# Patient Record
Sex: FEMALE | Employment: OTHER | URBAN - METROPOLITAN AREA
[De-identification: names, ages, dates, MRNs, and addresses within clinical notes are randomized per-mention and may not be internally consistent; named-entity substitution may affect disease eponyms.]

---

## 2020-01-01 ENCOUNTER — APPOINTMENT (OUTPATIENT)
Dept: CT IMAGING | Age: 75
DRG: 208 | End: 2020-01-01
Attending: INTERNAL MEDICINE
Payer: SELF-PAY

## 2020-01-01 ENCOUNTER — APPOINTMENT (OUTPATIENT)
Dept: GENERAL RADIOLOGY | Age: 75
DRG: 208 | End: 2020-01-01
Attending: INTERNAL MEDICINE
Payer: SELF-PAY

## 2020-01-01 ENCOUNTER — APPOINTMENT (OUTPATIENT)
Dept: GENERAL RADIOLOGY | Age: 75
DRG: 208 | End: 2020-01-01
Attending: ANESTHESIOLOGY
Payer: SELF-PAY

## 2020-01-01 ENCOUNTER — APPOINTMENT (OUTPATIENT)
Dept: NON INVASIVE DIAGNOSTICS | Age: 75
DRG: 208 | End: 2020-01-01
Attending: INTERNAL MEDICINE
Payer: SELF-PAY

## 2020-01-01 ENCOUNTER — APPOINTMENT (OUTPATIENT)
Dept: ULTRASOUND IMAGING | Age: 75
DRG: 208 | End: 2020-01-01
Attending: INTERNAL MEDICINE
Payer: SELF-PAY

## 2020-01-01 ENCOUNTER — ANESTHESIA EVENT (OUTPATIENT)
Dept: MEDSURG UNIT | Age: 75
DRG: 208 | End: 2020-01-01
Payer: SELF-PAY

## 2020-01-01 ENCOUNTER — APPOINTMENT (OUTPATIENT)
Dept: GENERAL RADIOLOGY | Age: 75
DRG: 208 | End: 2020-01-01
Attending: NURSE PRACTITIONER
Payer: SELF-PAY

## 2020-01-01 ENCOUNTER — APPOINTMENT (OUTPATIENT)
Dept: GENERAL RADIOLOGY | Age: 75
DRG: 208 | End: 2020-01-01
Attending: EMERGENCY MEDICINE
Payer: SELF-PAY

## 2020-01-01 ENCOUNTER — ANESTHESIA (OUTPATIENT)
Dept: MEDSURG UNIT | Age: 75
DRG: 208 | End: 2020-01-01
Payer: SELF-PAY

## 2020-01-01 ENCOUNTER — APPOINTMENT (OUTPATIENT)
Dept: VASCULAR SURGERY | Age: 75
DRG: 208 | End: 2020-01-01
Attending: INTERNAL MEDICINE
Payer: SELF-PAY

## 2020-01-01 ENCOUNTER — HOSPITAL ENCOUNTER (INPATIENT)
Age: 75
LOS: 19 days | DRG: 208 | End: 2020-06-24
Attending: EMERGENCY MEDICINE | Admitting: INTERNAL MEDICINE
Payer: SELF-PAY

## 2020-01-01 VITALS
HEIGHT: 59 IN | BODY MASS INDEX: 34.67 KG/M2 | SYSTOLIC BLOOD PRESSURE: 30 MMHG | TEMPERATURE: 99.1 F | OXYGEN SATURATION: 89 % | HEART RATE: 109 BPM | WEIGHT: 171.96 LBS | RESPIRATION RATE: 35 BRPM | DIASTOLIC BLOOD PRESSURE: 11 MMHG

## 2020-01-01 DIAGNOSIS — N17.9 ACUTE KIDNEY INJURY (HCC): Primary | ICD-10-CM

## 2020-01-01 DIAGNOSIS — U07.1 COVID-19: ICD-10-CM

## 2020-01-01 DIAGNOSIS — Z51.5 END OF LIFE CARE: ICD-10-CM

## 2020-01-01 DIAGNOSIS — R06.02 SOB (SHORTNESS OF BREATH): ICD-10-CM

## 2020-01-01 DIAGNOSIS — U07.1 ACUTE RESPIRATORY FAILURE DUE TO COVID-19 (HCC): ICD-10-CM

## 2020-01-01 DIAGNOSIS — J96.00 ACUTE RESPIRATORY FAILURE DUE TO COVID-19 (HCC): ICD-10-CM

## 2020-01-01 DIAGNOSIS — R53.83 FATIGUE, UNSPECIFIED TYPE: ICD-10-CM

## 2020-01-01 DIAGNOSIS — N17.9 AKI (ACUTE KIDNEY INJURY) (HCC): ICD-10-CM

## 2020-01-01 DIAGNOSIS — R77.8 ELEVATED TROPONIN: ICD-10-CM

## 2020-01-01 LAB
ABO + RH BLD: NORMAL
ADMINISTERED INITIALS, ADMINIT: NORMAL
ALBUMIN SERPL-MCNC: 1.4 G/DL (ref 3.5–5)
ALBUMIN SERPL-MCNC: 1.5 G/DL (ref 3.5–5)
ALBUMIN SERPL-MCNC: 2.1 G/DL (ref 3.5–5)
ALBUMIN SERPL-MCNC: 2.2 G/DL (ref 3.5–5)
ALBUMIN SERPL-MCNC: 2.3 G/DL (ref 3.5–5)
ALBUMIN SERPL-MCNC: 2.3 G/DL (ref 3.5–5)
ALBUMIN SERPL-MCNC: 2.4 G/DL (ref 3.5–5)
ALBUMIN SERPL-MCNC: 2.5 G/DL (ref 3.5–5)
ALBUMIN SERPL-MCNC: 2.6 G/DL (ref 3.5–5)
ALBUMIN SERPL-MCNC: 2.8 G/DL (ref 3.5–5)
ALBUMIN SERPL-MCNC: 3.6 G/DL (ref 3.5–5)
ALBUMIN/GLOB SERPL: 0.6 {RATIO} (ref 1.1–2.2)
ALBUMIN/GLOB SERPL: 0.7 {RATIO} (ref 1.1–2.2)
ALBUMIN/GLOB SERPL: 0.7 {RATIO} (ref 1.1–2.2)
ALBUMIN/GLOB SERPL: 0.9 {RATIO} (ref 1.1–2.2)
ALBUMIN/GLOB SERPL: 0.9 {RATIO} (ref 1.1–2.2)
ALBUMIN/GLOB SERPL: 1.1 {RATIO} (ref 1.1–2.2)
ALP SERPL-CCNC: 113 U/L (ref 45–117)
ALP SERPL-CCNC: 137 U/L (ref 45–117)
ALP SERPL-CCNC: 163 U/L (ref 45–117)
ALP SERPL-CCNC: 176 U/L (ref 45–117)
ALP SERPL-CCNC: 193 U/L (ref 45–117)
ALP SERPL-CCNC: 51 U/L (ref 45–117)
ALP SERPL-CCNC: 52 U/L (ref 45–117)
ALP SERPL-CCNC: 61 U/L (ref 45–117)
ALP SERPL-CCNC: 62 U/L (ref 45–117)
ALP SERPL-CCNC: 72 U/L (ref 45–117)
ALP SERPL-CCNC: 96 U/L (ref 45–117)
ALP SERPL-CCNC: 99 U/L (ref 45–117)
ALT SERPL-CCNC: 24 U/L (ref 12–78)
ALT SERPL-CCNC: 24 U/L (ref 12–78)
ALT SERPL-CCNC: 27 U/L (ref 12–78)
ALT SERPL-CCNC: 27 U/L (ref 12–78)
ALT SERPL-CCNC: 28 U/L (ref 12–78)
ALT SERPL-CCNC: 29 U/L (ref 12–78)
ALT SERPL-CCNC: 54 U/L (ref 12–78)
ALT SERPL-CCNC: 94 U/L (ref 12–78)
ANION GAP SERPL CALC-SCNC: 10 MMOL/L (ref 5–15)
ANION GAP SERPL CALC-SCNC: 11 MMOL/L (ref 5–15)
ANION GAP SERPL CALC-SCNC: 12 MMOL/L (ref 5–15)
ANION GAP SERPL CALC-SCNC: 15 MMOL/L (ref 5–15)
ANION GAP SERPL CALC-SCNC: 18 MMOL/L (ref 5–15)
ANION GAP SERPL CALC-SCNC: 18 MMOL/L (ref 5–15)
ANION GAP SERPL CALC-SCNC: 21 MMOL/L (ref 5–15)
ANION GAP SERPL CALC-SCNC: 23 MMOL/L (ref 5–15)
ANION GAP SERPL CALC-SCNC: 4 MMOL/L (ref 5–15)
ANION GAP SERPL CALC-SCNC: 6 MMOL/L (ref 5–15)
ANION GAP SERPL CALC-SCNC: 7 MMOL/L (ref 5–15)
ANION GAP SERPL CALC-SCNC: 8 MMOL/L (ref 5–15)
ANION GAP SERPL CALC-SCNC: 9 MMOL/L (ref 5–15)
APTT PPP: 103.1 SEC (ref 22.1–32)
APTT PPP: 112.3 SEC (ref 22.1–32)
APTT PPP: 124.3 SEC (ref 22.1–32)
APTT PPP: 127.9 SEC (ref 22.1–32)
APTT PPP: 29.5 SEC (ref 22.1–32)
APTT PPP: 31.4 SEC (ref 22.1–32)
APTT PPP: 32.5 SEC (ref 22.1–32)
APTT PPP: 32.7 SEC (ref 22.1–32)
APTT PPP: 34 SEC (ref 22.1–32)
APTT PPP: 35.4 SEC (ref 22.1–32)
APTT PPP: 39.3 SEC (ref 22.1–32)
APTT PPP: 41.3 SEC (ref 22.1–32)
APTT PPP: 42.8 SEC (ref 22.1–32)
APTT PPP: 45.3 SEC (ref 22.1–32)
APTT PPP: 47.8 SEC (ref 22.1–32)
APTT PPP: 49.8 SEC (ref 22.1–32)
APTT PPP: 49.9 SEC (ref 22.1–32)
APTT PPP: 50.5 SEC (ref 22.1–32)
APTT PPP: 50.7 SEC (ref 22.1–32)
APTT PPP: 51.5 SEC (ref 22.1–32)
APTT PPP: 53.7 SEC (ref 22.1–32)
APTT PPP: 59.4 SEC (ref 22.1–32)
APTT PPP: 62.6 SEC (ref 22.1–32)
APTT PPP: 64.6 SEC (ref 22.1–32)
APTT PPP: 66.7 SEC (ref 22.1–32)
APTT PPP: 77.8 SEC (ref 22.1–32)
APTT PPP: 79.5 SEC (ref 22.1–32)
APTT PPP: 80.9 SEC (ref 22.1–32)
APTT PPP: 83.5 SEC (ref 22.1–32)
APTT PPP: 91.5 SEC (ref 22.1–32)
APTT PPP: 93.4 SEC (ref 22.1–32)
APTT PPP: 95.6 SEC (ref 22.1–32)
APTT PPP: >130 SEC (ref 22.1–32)
ARTERIAL PATENCY WRIST A: ABNORMAL
ARTERIAL PATENCY WRIST A: YES
ARTERIAL PATENCY WRIST A: YES
AST SERPL-CCNC: 107 U/L (ref 15–37)
AST SERPL-CCNC: 256 U/L (ref 15–37)
AST SERPL-CCNC: 36 U/L (ref 15–37)
AST SERPL-CCNC: 36 U/L (ref 15–37)
AST SERPL-CCNC: 37 U/L (ref 15–37)
AST SERPL-CCNC: 50 U/L (ref 15–37)
AST SERPL-CCNC: 58 U/L (ref 15–37)
AST SERPL-CCNC: 58 U/L (ref 15–37)
AST SERPL-CCNC: 62 U/L (ref 15–37)
AST SERPL-CCNC: 64 U/L (ref 15–37)
AST SERPL-CCNC: 66 U/L (ref 15–37)
AST SERPL-CCNC: 68 U/L (ref 15–37)
ATRIAL RATE: 89 BPM
ATRIAL RATE: 92 BPM
ATRIAL RATE: 92 BPM
ATRIAL RATE: 96 BPM
AV VELOCITY RATIO: 0.58
AV VTI RATIO: 0.5
B PERT DNA SPEC QL NAA+PROBE: NOT DETECTED
BACTERIA SPEC CULT: NORMAL
BACTERIA SPEC CULT: NORMAL
BASE DEFICIT BLDA-SCNC: 16.9 MMOL/L
BASE DEFICIT BLDA-SCNC: 25.3 MMOL/L
BASE DEFICIT BLDA-SCNC: 5.2 MMOL/L
BASE DEFICIT BLDA-SCNC: 9.2 MMOL/L
BASOPHILS # BLD: 0 K/UL (ref 0–0.1)
BASOPHILS NFR BLD: 0 % (ref 0–1)
BDY SITE: ABNORMAL
BILIRUB DIRECT SERPL-MCNC: 0.2 MG/DL (ref 0–0.2)
BILIRUB DIRECT SERPL-MCNC: 0.3 MG/DL (ref 0–0.2)
BILIRUB SERPL-MCNC: 0.3 MG/DL (ref 0.2–1)
BILIRUB SERPL-MCNC: 0.4 MG/DL (ref 0.2–1)
BILIRUB SERPL-MCNC: 0.4 MG/DL (ref 0.2–1)
BILIRUB SERPL-MCNC: 0.5 MG/DL (ref 0.2–1)
BILIRUB SERPL-MCNC: 0.5 MG/DL (ref 0.2–1)
BILIRUB SERPL-MCNC: 0.6 MG/DL (ref 0.2–1)
BILIRUB SERPL-MCNC: 0.7 MG/DL (ref 0.2–1)
BILIRUB SERPL-MCNC: 0.7 MG/DL (ref 0.2–1)
BLD PROD TYP BPU: NORMAL
BLOOD GROUP ANTIBODIES SERPL: NORMAL
BNP SERPL-MCNC: 1056 PG/ML
BNP SERPL-MCNC: 1254 PG/ML
BNP SERPL-MCNC: 1267 PG/ML
BNP SERPL-MCNC: 1397 PG/ML
BNP SERPL-MCNC: 1419 PG/ML
BNP SERPL-MCNC: 1610 PG/ML
BNP SERPL-MCNC: 1789 PG/ML
BNP SERPL-MCNC: 2002 PG/ML
BNP SERPL-MCNC: 2212 PG/ML
BNP SERPL-MCNC: 2600 PG/ML
BNP SERPL-MCNC: 2604 PG/ML
BNP SERPL-MCNC: 3330 PG/ML
BNP SERPL-MCNC: 3635 PG/ML
BNP SERPL-MCNC: 445 PG/ML
BNP SERPL-MCNC: 9493 PG/ML
BORDETELLA PARAPERTUSSIS PCR, BORPAR: NOT DETECTED
BPU ID: NORMAL
BUN SERPL-MCNC: 24 MG/DL (ref 6–20)
BUN SERPL-MCNC: 26 MG/DL (ref 6–20)
BUN SERPL-MCNC: 27 MG/DL (ref 6–20)
BUN SERPL-MCNC: 28 MG/DL (ref 6–20)
BUN SERPL-MCNC: 31 MG/DL (ref 6–20)
BUN SERPL-MCNC: 35 MG/DL (ref 6–20)
BUN SERPL-MCNC: 37 MG/DL (ref 6–20)
BUN SERPL-MCNC: 45 MG/DL (ref 6–20)
BUN SERPL-MCNC: 46 MG/DL (ref 6–20)
BUN SERPL-MCNC: 49 MG/DL (ref 6–20)
BUN SERPL-MCNC: 53 MG/DL (ref 6–20)
BUN SERPL-MCNC: 54 MG/DL (ref 6–20)
BUN SERPL-MCNC: 62 MG/DL (ref 6–20)
BUN SERPL-MCNC: 69 MG/DL (ref 6–20)
BUN SERPL-MCNC: 71 MG/DL (ref 6–20)
BUN SERPL-MCNC: 72 MG/DL (ref 6–20)
BUN SERPL-MCNC: 73 MG/DL (ref 6–20)
BUN SERPL-MCNC: 73 MG/DL (ref 6–20)
BUN SERPL-MCNC: 74 MG/DL (ref 6–20)
BUN SERPL-MCNC: 75 MG/DL (ref 6–20)
BUN SERPL-MCNC: 75 MG/DL (ref 6–20)
BUN SERPL-MCNC: 80 MG/DL (ref 6–20)
BUN SERPL-MCNC: 83 MG/DL (ref 6–20)
BUN/CREAT SERPL: 10 (ref 12–20)
BUN/CREAT SERPL: 11 (ref 12–20)
BUN/CREAT SERPL: 12 (ref 12–20)
BUN/CREAT SERPL: 13 (ref 12–20)
BUN/CREAT SERPL: 14 (ref 12–20)
BUN/CREAT SERPL: 14 (ref 12–20)
BUN/CREAT SERPL: 15 (ref 12–20)
BUN/CREAT SERPL: 15 (ref 12–20)
BUN/CREAT SERPL: 16 (ref 12–20)
BUN/CREAT SERPL: 16 (ref 12–20)
BUN/CREAT SERPL: 17 (ref 12–20)
BUN/CREAT SERPL: 18 (ref 12–20)
BUN/CREAT SERPL: 20 (ref 12–20)
C PNEUM DNA SPEC QL NAA+PROBE: NOT DETECTED
CALCIUM SERPL-MCNC: 6 MG/DL (ref 8.5–10.1)
CALCIUM SERPL-MCNC: 6.6 MG/DL (ref 8.5–10.1)
CALCIUM SERPL-MCNC: 6.7 MG/DL (ref 8.5–10.1)
CALCIUM SERPL-MCNC: 6.7 MG/DL (ref 8.5–10.1)
CALCIUM SERPL-MCNC: 6.8 MG/DL (ref 8.5–10.1)
CALCIUM SERPL-MCNC: 7 MG/DL (ref 8.5–10.1)
CALCIUM SERPL-MCNC: 7 MG/DL (ref 8.5–10.1)
CALCIUM SERPL-MCNC: 7.2 MG/DL (ref 8.5–10.1)
CALCIUM SERPL-MCNC: 7.3 MG/DL (ref 8.5–10.1)
CALCIUM SERPL-MCNC: 7.4 MG/DL (ref 8.5–10.1)
CALCIUM SERPL-MCNC: 7.5 MG/DL (ref 8.5–10.1)
CALCIUM SERPL-MCNC: 7.5 MG/DL (ref 8.5–10.1)
CALCIUM SERPL-MCNC: 7.6 MG/DL (ref 8.5–10.1)
CALCIUM SERPL-MCNC: 7.6 MG/DL (ref 8.5–10.1)
CALCIUM SERPL-MCNC: 7.9 MG/DL (ref 8.5–10.1)
CALCIUM SERPL-MCNC: 8 MG/DL (ref 8.5–10.1)
CALCIUM SERPL-MCNC: 8.1 MG/DL (ref 8.5–10.1)
CALCIUM SERPL-MCNC: 8.4 MG/DL (ref 8.5–10.1)
CALCIUM SERPL-MCNC: 8.5 MG/DL (ref 8.5–10.1)
CALCIUM SERPL-MCNC: 8.6 MG/DL (ref 8.5–10.1)
CALCIUM SERPL-MCNC: 8.7 MG/DL (ref 8.5–10.1)
CALCIUM SERPL-MCNC: 9.3 MG/DL (ref 8.5–10.1)
CALCIUM SERPL-MCNC: <5 MG/DL (ref 8.5–10.1)
CALCULATED P AXIS, ECG09: 24 DEGREES
CALCULATED P AXIS, ECG09: 29 DEGREES
CALCULATED P AXIS, ECG09: 29 DEGREES
CALCULATED P AXIS, ECG09: 38 DEGREES
CALCULATED R AXIS, ECG10: -16 DEGREES
CALCULATED R AXIS, ECG10: -16 DEGREES
CALCULATED R AXIS, ECG10: -24 DEGREES
CALCULATED R AXIS, ECG10: -5 DEGREES
CALCULATED T AXIS, ECG11: 43 DEGREES
CALCULATED T AXIS, ECG11: 43 DEGREES
CALCULATED T AXIS, ECG11: 47 DEGREES
CALCULATED T AXIS, ECG11: 48 DEGREES
CHLORIDE SERPL-SCNC: 104 MMOL/L (ref 97–108)
CHLORIDE SERPL-SCNC: 105 MMOL/L (ref 97–108)
CHLORIDE SERPL-SCNC: 105 MMOL/L (ref 97–108)
CHLORIDE SERPL-SCNC: 106 MMOL/L (ref 97–108)
CHLORIDE SERPL-SCNC: 108 MMOL/L (ref 97–108)
CHLORIDE SERPL-SCNC: 108 MMOL/L (ref 97–108)
CHLORIDE SERPL-SCNC: 109 MMOL/L (ref 97–108)
CHLORIDE SERPL-SCNC: 109 MMOL/L (ref 97–108)
CHLORIDE SERPL-SCNC: 110 MMOL/L (ref 97–108)
CHLORIDE SERPL-SCNC: 111 MMOL/L (ref 97–108)
CHLORIDE SERPL-SCNC: 112 MMOL/L (ref 97–108)
CHLORIDE SERPL-SCNC: 112 MMOL/L (ref 97–108)
CHLORIDE SERPL-SCNC: 113 MMOL/L (ref 97–108)
CHLORIDE SERPL-SCNC: 114 MMOL/L (ref 97–108)
CHLORIDE SERPL-SCNC: 115 MMOL/L (ref 97–108)
CHLORIDE SERPL-SCNC: 117 MMOL/L (ref 97–108)
CHLORIDE SERPL-SCNC: 118 MMOL/L (ref 97–108)
CHLORIDE SERPL-SCNC: 119 MMOL/L (ref 97–108)
CHLORIDE SERPL-SCNC: 96 MMOL/L (ref 97–108)
CHLORIDE UR-SCNC: 78 MMOL/L
CK SERPL-CCNC: 111 U/L (ref 26–192)
CO2 SERPL-SCNC: 10 MMOL/L (ref 21–32)
CO2 SERPL-SCNC: 10 MMOL/L (ref 21–32)
CO2 SERPL-SCNC: 12 MMOL/L (ref 21–32)
CO2 SERPL-SCNC: 13 MMOL/L (ref 21–32)
CO2 SERPL-SCNC: 15 MMOL/L (ref 21–32)
CO2 SERPL-SCNC: 17 MMOL/L (ref 21–32)
CO2 SERPL-SCNC: 19 MMOL/L (ref 21–32)
CO2 SERPL-SCNC: 20 MMOL/L (ref 21–32)
CO2 SERPL-SCNC: 21 MMOL/L (ref 21–32)
CO2 SERPL-SCNC: 21 MMOL/L (ref 21–32)
CO2 SERPL-SCNC: 22 MMOL/L (ref 21–32)
CO2 SERPL-SCNC: 22 MMOL/L (ref 21–32)
CO2 SERPL-SCNC: 23 MMOL/L (ref 21–32)
COHGB MFR BLD: 1.2 % (ref 1–2)
COHGB MFR BLD: 1.7 % (ref 1–2)
COHGB MFR BLD: 1.9 % (ref 1–2)
COHGB MFR BLD: 2.1 % (ref 1–2)
COMMENT, HOLDF: NORMAL
COVID-19 RAPID TEST, COVR: DETECTED
CREAT SERPL-MCNC: 2.4 MG/DL (ref 0.55–1.02)
CREAT SERPL-MCNC: 2.44 MG/DL (ref 0.55–1.02)
CREAT SERPL-MCNC: 2.49 MG/DL (ref 0.55–1.02)
CREAT SERPL-MCNC: 2.5 MG/DL (ref 0.55–1.02)
CREAT SERPL-MCNC: 2.79 MG/DL (ref 0.55–1.02)
CREAT SERPL-MCNC: 2.79 MG/DL (ref 0.55–1.02)
CREAT SERPL-MCNC: 3.02 MG/DL (ref 0.55–1.02)
CREAT SERPL-MCNC: 3.07 MG/DL (ref 0.55–1.02)
CREAT SERPL-MCNC: 3.27 MG/DL (ref 0.55–1.02)
CREAT SERPL-MCNC: 3.39 MG/DL (ref 0.55–1.02)
CREAT SERPL-MCNC: 3.58 MG/DL (ref 0.55–1.02)
CREAT SERPL-MCNC: 3.74 MG/DL (ref 0.55–1.02)
CREAT SERPL-MCNC: 4.02 MG/DL (ref 0.55–1.02)
CREAT SERPL-MCNC: 4.14 MG/DL (ref 0.55–1.02)
CREAT SERPL-MCNC: 4.15 MG/DL (ref 0.55–1.02)
CREAT SERPL-MCNC: 4.17 MG/DL (ref 0.55–1.02)
CREAT SERPL-MCNC: 4.18 MG/DL (ref 0.55–1.02)
CREAT SERPL-MCNC: 4.19 MG/DL (ref 0.55–1.02)
CREAT SERPL-MCNC: 4.2 MG/DL (ref 0.55–1.02)
CREAT SERPL-MCNC: 4.27 MG/DL (ref 0.55–1.02)
CREAT SERPL-MCNC: 4.93 MG/DL (ref 0.55–1.02)
CREAT SERPL-MCNC: 5.1 MG/DL (ref 0.55–1.02)
CREAT SERPL-MCNC: 5.39 MG/DL (ref 0.55–1.02)
CREAT UR-MCNC: 59 MG/DL
CREAT UR-MCNC: 62.1 MG/DL
CROSSMATCH RESULT,%XM: NORMAL
CRP SERPL-MCNC: 1.66 MG/DL (ref 0–0.6)
CRP SERPL-MCNC: 10.1 MG/DL (ref 0–0.6)
CRP SERPL-MCNC: 13.1 MG/DL (ref 0–0.6)
CRP SERPL-MCNC: 14.4 MG/DL (ref 0–0.6)
CRP SERPL-MCNC: 14.7 MG/DL (ref 0–0.6)
CRP SERPL-MCNC: 15.5 MG/DL (ref 0–0.6)
CRP SERPL-MCNC: 15.6 MG/DL (ref 0–0.6)
CRP SERPL-MCNC: 16.9 MG/DL (ref 0–0.6)
CRP SERPL-MCNC: 17.3 MG/DL (ref 0–0.6)
CRP SERPL-MCNC: 18.9 MG/DL (ref 0–0.6)
CRP SERPL-MCNC: 19.7 MG/DL (ref 0–0.6)
CRP SERPL-MCNC: 20.2 MG/DL (ref 0–0.6)
CRP SERPL-MCNC: 3.84 MG/DL (ref 0–0.6)
CRP SERPL-MCNC: 4.68 MG/DL (ref 0–0.6)
CRP SERPL-MCNC: 5.8 MG/DL (ref 0–0.6)
CRP SERPL-MCNC: 7.18 MG/DL (ref 0–0.6)
CRP SERPL-MCNC: 9.11 MG/DL (ref 0–0.6)
CRP SERPL-MCNC: 9.89 MG/DL (ref 0–0.6)
D DIMER PPP FEU-MCNC: 0.47 MG/L FEU (ref 0–0.65)
D DIMER PPP FEU-MCNC: 0.52 MG/L FEU (ref 0–0.65)
D DIMER PPP FEU-MCNC: 0.53 MG/L FEU (ref 0–0.65)
D DIMER PPP FEU-MCNC: 0.56 MG/L FEU (ref 0–0.65)
D DIMER PPP FEU-MCNC: 0.79 MG/L FEU (ref 0–0.65)
D DIMER PPP FEU-MCNC: 0.84 MG/L FEU (ref 0–0.65)
D DIMER PPP FEU-MCNC: 0.91 MG/L FEU (ref 0–0.65)
D DIMER PPP FEU-MCNC: 0.96 MG/L FEU (ref 0–0.65)
D DIMER PPP FEU-MCNC: 1.23 MG/L FEU (ref 0–0.65)
D DIMER PPP FEU-MCNC: 1.25 MG/L FEU (ref 0–0.65)
D DIMER PPP FEU-MCNC: 1.41 MG/L FEU (ref 0–0.65)
D DIMER PPP FEU-MCNC: 1.57 MG/L FEU (ref 0–0.65)
D DIMER PPP FEU-MCNC: 1.68 MG/L FEU (ref 0–0.65)
D DIMER PPP FEU-MCNC: 1.72 MG/L FEU (ref 0–0.65)
D DIMER PPP FEU-MCNC: 11.31 MG/L FEU (ref 0–0.65)
D DIMER PPP FEU-MCNC: 2.36 MG/L FEU (ref 0–0.65)
D DIMER PPP FEU-MCNC: 2.97 MG/L FEU (ref 0–0.65)
D DIMER PPP FEU-MCNC: 6.12 MG/L FEU (ref 0–0.65)
D50 ADMINISTERED, D50ADM: 0 ML
D50 ORDER, D50ORD: 0 ML
DIAGNOSIS, 93000: NORMAL
DIFFERENTIAL METHOD BLD: ABNORMAL
DIFFERENTIAL METHOD BLD: NORMAL
ECHO AO ASC DIAM: 3.12 CM
ECHO AO ROOT DIAM: 3.06 CM
ECHO AV AREA PEAK VELOCITY: 1.5 CM2
ECHO AV AREA VTI: 1.4 CM2
ECHO AV AREA/BSA PEAK VELOCITY: 0.8 CM2/M2
ECHO AV AREA/BSA VTI: 0.7 CM2/M2
ECHO AV MEAN GRADIENT: 8 MMHG
ECHO AV MEAN VELOCITY: 1.29 M/S
ECHO AV PEAK GRADIENT: 17.1 MMHG
ECHO AV PEAK VELOCITY: 207.02 CM/S
ECHO AV VTI: 33 CM
ECHO LA AREA 4C: 12.3 CM2
ECHO LA MAJOR AXIS: 3.64 CM
ECHO LA TO AORTIC ROOT RATIO: 1.19
ECHO LA VOL 2C: 18.14 ML (ref 22–52)
ECHO LA VOL 4C: 25.71 ML (ref 22–52)
ECHO LA VOL BP: 21.6 ML (ref 22–52)
ECHO LA VOL/BSA BIPLANE: 12.49 ML/M2 (ref 16–28)
ECHO LA VOLUME INDEX A2C: 10.49 ML/M2 (ref 16–28)
ECHO LA VOLUME INDEX A4C: 14.87 ML/M2 (ref 16–28)
ECHO LV E' LATERAL VELOCITY: 5.45 CENTIMETER/SECOND
ECHO LV E' SEPTAL VELOCITY: 6.02 CENTIMETER/SECOND
ECHO LV EDV TEICHHOLZ: 33.32 ML
ECHO LV ESV TEICHHOLZ: 14.49 ML
ECHO LV INTERNAL DIMENSION DIASTOLIC: 3.8 CM (ref 3.9–5.3)
ECHO LV INTERNAL DIMENSION SYSTOLIC: 2.7 CM
ECHO LV IVSD: 0.85 CM (ref 0.6–0.9)
ECHO LV MASS 2D: 110.6 G (ref 67–162)
ECHO LV MASS INDEX 2D: 64 G/M2 (ref 43–95)
ECHO LV POSTERIOR WALL DIASTOLIC: 0.93 CM (ref 0.6–0.9)
ECHO LVOT DIAM: 1.79 CM
ECHO LVOT PEAK GRADIENT: 5.8 MMHG
ECHO LVOT PEAK VELOCITY: 120.32 CM/S
ECHO LVOT SV: 45.2 ML
ECHO LVOT VTI: 18.04 CM
ECHO MV A VELOCITY: 103.62 CM/S
ECHO MV AREA PHT: 4.7 CM2
ECHO MV E DECELERATION TIME (DT): 161.7 MS
ECHO MV E VELOCITY: 83.86 CM/S
ECHO MV E/A RATIO: 0.81
ECHO MV PRESSURE HALF TIME (PHT): 46.9 MS
ECHO RV INTERNAL DIMENSION: 3.15 CM
ECHO RV TAPSE: 1.78 CM (ref 1.5–2)
EOSINOPHIL # BLD: 0 K/UL (ref 0–0.4)
EOSINOPHIL # BLD: 0.2 K/UL (ref 0–0.4)
EOSINOPHIL NFR BLD: 0 % (ref 0–7)
EOSINOPHIL NFR BLD: 2 % (ref 0–7)
EPAP/CPAP/PEEP, PAPEEP: 8
ERYTHROCYTE [DISTWIDTH] IN BLOOD BY AUTOMATED COUNT: 13.4 % (ref 11.5–14.5)
ERYTHROCYTE [DISTWIDTH] IN BLOOD BY AUTOMATED COUNT: 13.6 % (ref 11.5–14.5)
ERYTHROCYTE [DISTWIDTH] IN BLOOD BY AUTOMATED COUNT: 13.6 % (ref 11.5–14.5)
ERYTHROCYTE [DISTWIDTH] IN BLOOD BY AUTOMATED COUNT: 13.7 % (ref 11.5–14.5)
ERYTHROCYTE [DISTWIDTH] IN BLOOD BY AUTOMATED COUNT: 13.8 % (ref 11.5–14.5)
ERYTHROCYTE [DISTWIDTH] IN BLOOD BY AUTOMATED COUNT: 13.8 % (ref 11.5–14.5)
ERYTHROCYTE [DISTWIDTH] IN BLOOD BY AUTOMATED COUNT: 13.9 % (ref 11.5–14.5)
ERYTHROCYTE [DISTWIDTH] IN BLOOD BY AUTOMATED COUNT: 13.9 % (ref 11.5–14.5)
ERYTHROCYTE [DISTWIDTH] IN BLOOD BY AUTOMATED COUNT: 14 % (ref 11.5–14.5)
ERYTHROCYTE [DISTWIDTH] IN BLOOD BY AUTOMATED COUNT: 14.1 % (ref 11.5–14.5)
ERYTHROCYTE [DISTWIDTH] IN BLOOD BY AUTOMATED COUNT: 14.2 % (ref 11.5–14.5)
ERYTHROCYTE [DISTWIDTH] IN BLOOD BY AUTOMATED COUNT: 14.7 % (ref 11.5–14.5)
ERYTHROCYTE [DISTWIDTH] IN BLOOD BY AUTOMATED COUNT: 15.8 % (ref 11.5–14.5)
ERYTHROCYTE [DISTWIDTH] IN BLOOD BY AUTOMATED COUNT: 15.9 % (ref 11.5–14.5)
ERYTHROCYTE [DISTWIDTH] IN BLOOD BY AUTOMATED COUNT: 15.9 % (ref 11.5–14.5)
ERYTHROCYTE [DISTWIDTH] IN BLOOD BY AUTOMATED COUNT: 16.1 % (ref 11.5–14.5)
EST. AVERAGE GLUCOSE BLD GHB EST-MCNC: 160 MG/DL
EST. AVERAGE GLUCOSE BLD GHB EST-MCNC: 180 MG/DL
FERRITIN SERPL-MCNC: 109 NG/ML (ref 26–388)
FERRITIN SERPL-MCNC: 111 NG/ML (ref 8–252)
FERRITIN SERPL-MCNC: 144 NG/ML (ref 26–388)
FERRITIN SERPL-MCNC: 146 NG/ML (ref 8–252)
FERRITIN SERPL-MCNC: 164 NG/ML (ref 26–388)
FERRITIN SERPL-MCNC: 214 NG/ML (ref 26–388)
FERRITIN SERPL-MCNC: 239 NG/ML (ref 26–388)
FERRITIN SERPL-MCNC: 256 NG/ML (ref 26–388)
FERRITIN SERPL-MCNC: 283 NG/ML (ref 8–252)
FERRITIN SERPL-MCNC: 340 NG/ML (ref 8–252)
FERRITIN SERPL-MCNC: 371 NG/ML (ref 26–388)
FERRITIN SERPL-MCNC: 384 NG/ML (ref 8–252)
FERRITIN SERPL-MCNC: 415 NG/ML (ref 26–388)
FERRITIN SERPL-MCNC: 427 NG/ML (ref 26–388)
FERRITIN SERPL-MCNC: 4373 NG/ML (ref 8–252)
FERRITIN SERPL-MCNC: 443 NG/ML (ref 26–388)
FERRITIN SERPL-MCNC: 5674 NG/ML (ref 8–252)
FERRITIN SERPL-MCNC: ABNORMAL NG/ML (ref 26–388)
FIBRINOGEN PPP-MCNC: 123 MG/DL (ref 200–475)
FIBRINOGEN PPP-MCNC: 141 MG/DL (ref 200–475)
FIBRINOGEN PPP-MCNC: 146 MG/DL (ref 200–475)
FIBRINOGEN PPP-MCNC: 365 MG/DL (ref 200–475)
FIBRINOGEN PPP-MCNC: 408 MG/DL (ref 200–475)
FIBRINOGEN PPP-MCNC: 434 MG/DL (ref 200–475)
FIBRINOGEN PPP-MCNC: 475 MG/DL (ref 200–475)
FIBRINOGEN PPP-MCNC: 498 MG/DL (ref 200–475)
FIBRINOGEN PPP-MCNC: 516 MG/DL (ref 200–475)
FIBRINOGEN PPP-MCNC: 546 MG/DL (ref 200–475)
FIBRINOGEN PPP-MCNC: 567 MG/DL (ref 200–475)
FIBRINOGEN PPP-MCNC: 570 MG/DL (ref 200–475)
FIBRINOGEN PPP-MCNC: 578 MG/DL (ref 200–475)
FIBRINOGEN PPP-MCNC: 607 MG/DL (ref 200–475)
FIBRINOGEN PPP-MCNC: 649 MG/DL (ref 200–475)
FIBRINOGEN PPP-MCNC: 671 MG/DL (ref 200–475)
FIBRINOGEN PPP-MCNC: 671 MG/DL (ref 200–475)
FIBRINOGEN PPP-MCNC: 702 MG/DL (ref 200–475)
FIO2 ON VENT: 100 %
FIO2 ON VENT: 60 %
FIO2 ON VENT: 80 %
FIO2 ON VENT: 80 %
FLUAV H1 2009 PAND RNA SPEC QL NAA+PROBE: NOT DETECTED
FLUAV H1 RNA SPEC QL NAA+PROBE: NOT DETECTED
FLUAV H3 RNA SPEC QL NAA+PROBE: NOT DETECTED
FLUAV SUBTYP SPEC NAA+PROBE: NOT DETECTED
FLUBV RNA SPEC QL NAA+PROBE: NOT DETECTED
GAS FLOW.O2 O2 DELIVERY SYS: 4 L/MIN
GAS FLOW.O2 SETTING OXYMISER: 18 L/MIN
GAS FLOW.O2 SETTING OXYMISER: 25 L/MIN
GLOBULIN SER CALC-MCNC: 2.1 G/DL (ref 2–4)
GLOBULIN SER CALC-MCNC: 3.1 G/DL (ref 2–4)
GLOBULIN SER CALC-MCNC: 3.5 G/DL (ref 2–4)
GLOBULIN SER CALC-MCNC: 3.8 G/DL (ref 2–4)
GLOBULIN SER CALC-MCNC: 3.9 G/DL (ref 2–4)
GLOBULIN SER CALC-MCNC: 3.9 G/DL (ref 2–4)
GLOBULIN SER CALC-MCNC: 4.1 G/DL (ref 2–4)
GLOBULIN SER CALC-MCNC: 4.2 G/DL (ref 2–4)
GLOBULIN SER CALC-MCNC: 4.2 G/DL (ref 2–4)
GLSCOM COMMENTS: NORMAL
GLUCOSE BLD STRIP.AUTO-MCNC: 102 MG/DL (ref 65–100)
GLUCOSE BLD STRIP.AUTO-MCNC: 121 MG/DL (ref 65–100)
GLUCOSE BLD STRIP.AUTO-MCNC: 122 MG/DL (ref 65–100)
GLUCOSE BLD STRIP.AUTO-MCNC: 137 MG/DL (ref 65–100)
GLUCOSE BLD STRIP.AUTO-MCNC: 140 MG/DL (ref 65–100)
GLUCOSE BLD STRIP.AUTO-MCNC: 159 MG/DL (ref 65–100)
GLUCOSE BLD STRIP.AUTO-MCNC: 160 MG/DL (ref 65–100)
GLUCOSE BLD STRIP.AUTO-MCNC: 160 MG/DL (ref 65–100)
GLUCOSE BLD STRIP.AUTO-MCNC: 174 MG/DL (ref 65–100)
GLUCOSE BLD STRIP.AUTO-MCNC: 185 MG/DL (ref 65–100)
GLUCOSE BLD STRIP.AUTO-MCNC: 185 MG/DL (ref 65–100)
GLUCOSE BLD STRIP.AUTO-MCNC: 210 MG/DL (ref 65–100)
GLUCOSE BLD STRIP.AUTO-MCNC: 215 MG/DL (ref 65–100)
GLUCOSE BLD STRIP.AUTO-MCNC: 221 MG/DL (ref 65–100)
GLUCOSE BLD STRIP.AUTO-MCNC: 234 MG/DL (ref 65–100)
GLUCOSE BLD STRIP.AUTO-MCNC: 236 MG/DL (ref 65–100)
GLUCOSE BLD STRIP.AUTO-MCNC: 245 MG/DL (ref 65–100)
GLUCOSE BLD STRIP.AUTO-MCNC: 267 MG/DL (ref 65–100)
GLUCOSE BLD STRIP.AUTO-MCNC: 279 MG/DL (ref 65–100)
GLUCOSE BLD STRIP.AUTO-MCNC: 310 MG/DL (ref 65–100)
GLUCOSE BLD STRIP.AUTO-MCNC: 313 MG/DL (ref 65–100)
GLUCOSE BLD STRIP.AUTO-MCNC: 316 MG/DL (ref 65–100)
GLUCOSE BLD STRIP.AUTO-MCNC: 329 MG/DL (ref 65–100)
GLUCOSE BLD STRIP.AUTO-MCNC: 335 MG/DL (ref 65–100)
GLUCOSE BLD STRIP.AUTO-MCNC: 338 MG/DL (ref 65–100)
GLUCOSE BLD STRIP.AUTO-MCNC: 341 MG/DL (ref 65–100)
GLUCOSE BLD STRIP.AUTO-MCNC: 351 MG/DL (ref 65–100)
GLUCOSE BLD STRIP.AUTO-MCNC: 372 MG/DL (ref 65–100)
GLUCOSE BLD STRIP.AUTO-MCNC: 377 MG/DL (ref 65–100)
GLUCOSE BLD STRIP.AUTO-MCNC: 443 MG/DL (ref 65–100)
GLUCOSE BLD STRIP.AUTO-MCNC: 455 MG/DL (ref 65–100)
GLUCOSE BLD STRIP.AUTO-MCNC: 463 MG/DL (ref 65–100)
GLUCOSE BLD STRIP.AUTO-MCNC: 497 MG/DL (ref 65–100)
GLUCOSE BLD STRIP.AUTO-MCNC: 565 MG/DL (ref 65–100)
GLUCOSE BLD STRIP.AUTO-MCNC: 581 MG/DL (ref 65–100)
GLUCOSE BLD STRIP.AUTO-MCNC: 591 MG/DL (ref 65–100)
GLUCOSE BLD STRIP.AUTO-MCNC: 61 MG/DL (ref 65–100)
GLUCOSE BLD STRIP.AUTO-MCNC: 70 MG/DL (ref 65–100)
GLUCOSE BLD STRIP.AUTO-MCNC: 73 MG/DL (ref 65–100)
GLUCOSE BLD STRIP.AUTO-MCNC: 89 MG/DL (ref 65–100)
GLUCOSE BLD STRIP.AUTO-MCNC: 93 MG/DL (ref 65–100)
GLUCOSE BLD STRIP.AUTO-MCNC: >600 MG/DL (ref 65–100)
GLUCOSE SERPL-MCNC: 101 MG/DL (ref 65–100)
GLUCOSE SERPL-MCNC: 106 MG/DL (ref 65–100)
GLUCOSE SERPL-MCNC: 111 MG/DL (ref 65–100)
GLUCOSE SERPL-MCNC: 117 MG/DL (ref 65–100)
GLUCOSE SERPL-MCNC: 122 MG/DL (ref 65–100)
GLUCOSE SERPL-MCNC: 130 MG/DL (ref 65–100)
GLUCOSE SERPL-MCNC: 134 MG/DL (ref 65–100)
GLUCOSE SERPL-MCNC: 154 MG/DL (ref 65–100)
GLUCOSE SERPL-MCNC: 158 MG/DL (ref 65–100)
GLUCOSE SERPL-MCNC: 211 MG/DL (ref 65–100)
GLUCOSE SERPL-MCNC: 232 MG/DL (ref 65–100)
GLUCOSE SERPL-MCNC: 239 MG/DL (ref 65–100)
GLUCOSE SERPL-MCNC: 273 MG/DL (ref 65–100)
GLUCOSE SERPL-MCNC: 275 MG/DL (ref 65–100)
GLUCOSE SERPL-MCNC: 284 MG/DL (ref 65–100)
GLUCOSE SERPL-MCNC: 323 MG/DL (ref 65–100)
GLUCOSE SERPL-MCNC: 443 MG/DL (ref 65–100)
GLUCOSE SERPL-MCNC: 51 MG/DL (ref 65–100)
GLUCOSE SERPL-MCNC: 531 MG/DL (ref 65–100)
GLUCOSE SERPL-MCNC: 646 MG/DL (ref 65–100)
GLUCOSE SERPL-MCNC: 67 MG/DL (ref 65–100)
GLUCOSE SERPL-MCNC: 89 MG/DL (ref 65–100)
GLUCOSE SERPL-MCNC: 96 MG/DL (ref 65–100)
GLUCOSE, GLC: 102 MG/DL
GLUCOSE, GLC: 121 MG/DL
GLUCOSE, GLC: 122 MG/DL
GLUCOSE, GLC: 174 MG/DL
GLUCOSE, GLC: 185 MG/DL
GLUCOSE, GLC: 234 MG/DL
GLUCOSE, GLC: 236 MG/DL
GLUCOSE, GLC: 310 MG/DL
GLUCOSE, GLC: 313 MG/DL
GLUCOSE, GLC: 377 MG/DL
GLUCOSE, GLC: 443 MG/DL
GLUCOSE, GLC: 463 MG/DL
GLUCOSE, GLC: 497 MG/DL
GLUCOSE, GLC: 581 MG/DL
GLUCOSE, GLC: 600 MG/DL
GLUCOSE, GLC: 89 MG/DL
GLUCOSE, GLC: 93 MG/DL
HADV DNA SPEC QL NAA+PROBE: NOT DETECTED
HAV IGM SER QL: NONREACTIVE
HBA1C MFR BLD: 7.2 % (ref 4–5.6)
HBA1C MFR BLD: 7.9 % (ref 4–5.6)
HBV CORE IGM SER QL: NONREACTIVE
HBV SURFACE AG SER QL: <0.1 INDEX
HBV SURFACE AG SER QL: NEGATIVE
HCO3 BLDA-SCNC: 11 MMOL/L (ref 22–26)
HCO3 BLDA-SCNC: 14 MMOL/L (ref 22–26)
HCO3 BLDA-SCNC: 20 MMOL/L (ref 22–26)
HCO3 BLDA-SCNC: 8 MMOL/L (ref 22–26)
HCOV 229E RNA SPEC QL NAA+PROBE: NOT DETECTED
HCOV HKU1 RNA SPEC QL NAA+PROBE: NOT DETECTED
HCOV NL63 RNA SPEC QL NAA+PROBE: NOT DETECTED
HCOV OC43 RNA SPEC QL NAA+PROBE: NOT DETECTED
HCT VFR BLD AUTO: 12.5 % (ref 35–47)
HCT VFR BLD AUTO: 21.3 % (ref 35–47)
HCT VFR BLD AUTO: 24.8 % (ref 35–47)
HCT VFR BLD AUTO: 25 % (ref 35–47)
HCT VFR BLD AUTO: 25.9 % (ref 35–47)
HCT VFR BLD AUTO: 26.4 % (ref 35–47)
HCT VFR BLD AUTO: 26.6 % (ref 35–47)
HCT VFR BLD AUTO: 27.8 % (ref 35–47)
HCT VFR BLD AUTO: 27.9 % (ref 35–47)
HCT VFR BLD AUTO: 29.2 % (ref 35–47)
HCT VFR BLD AUTO: 32 % (ref 35–47)
HCT VFR BLD AUTO: 32.4 % (ref 35–47)
HCT VFR BLD AUTO: 33.7 % (ref 35–47)
HCT VFR BLD AUTO: 34.4 % (ref 35–47)
HCT VFR BLD AUTO: 36 % (ref 35–47)
HCT VFR BLD AUTO: 38.4 % (ref 35–47)
HCT VFR BLD AUTO: 42.6 % (ref 35–47)
HCV AB SERPL QL IA: NONREACTIVE
HCV COMMENT,HCGAC: NORMAL
HGB BLD OXIMETRY-MCNC: 10.5 G/DL (ref 14–17)
HGB BLD OXIMETRY-MCNC: 10.9 G/DL (ref 14–17)
HGB BLD OXIMETRY-MCNC: 11 G/DL (ref 14–17)
HGB BLD OXIMETRY-MCNC: 11.6 G/DL (ref 14–17)
HGB BLD-MCNC: 10.1 G/DL (ref 11.5–16)
HGB BLD-MCNC: 10.4 G/DL (ref 11.5–16)
HGB BLD-MCNC: 10.5 G/DL (ref 11.5–16)
HGB BLD-MCNC: 10.6 G/DL (ref 11.5–16)
HGB BLD-MCNC: 11 G/DL (ref 11.5–16)
HGB BLD-MCNC: 11.6 G/DL (ref 11.5–16)
HGB BLD-MCNC: 11.7 G/DL (ref 11.5–16)
HGB BLD-MCNC: 11.8 G/DL (ref 11.5–16)
HGB BLD-MCNC: 13.5 G/DL (ref 11.5–16)
HGB BLD-MCNC: 3.6 G/DL (ref 11.5–16)
HGB BLD-MCNC: 6.7 G/DL (ref 11.5–16)
HGB BLD-MCNC: 8 G/DL (ref 11.5–16)
HGB BLD-MCNC: 8.3 G/DL (ref 11.5–16)
HGB BLD-MCNC: 8.7 G/DL (ref 11.5–16)
HGB BLD-MCNC: 8.7 G/DL (ref 11.5–16)
HGB BLD-MCNC: 8.8 G/DL (ref 11.5–16)
HGB BLD-MCNC: 8.9 G/DL (ref 11.5–16)
HGB BLD-MCNC: 9.3 G/DL (ref 11.5–16)
HIGH TARGET, HITG: 300 MG/DL
HIV 1+2 AB+HIV1 P24 AG SERPL QL IA: NONREACTIVE
HIV12 RESULT COMMENT, HHIVC: NORMAL
HMPV RNA SPEC QL NAA+PROBE: NOT DETECTED
HPIV1 RNA SPEC QL NAA+PROBE: NOT DETECTED
HPIV2 RNA SPEC QL NAA+PROBE: NOT DETECTED
HPIV3 RNA SPEC QL NAA+PROBE: NOT DETECTED
HPIV4 RNA SPEC QL NAA+PROBE: NOT DETECTED
IL6 SERPL-MCNC: 113.5 PG/ML (ref 0–15.5)
IMM GRANULOCYTES # BLD AUTO: 0 K/UL
IMM GRANULOCYTES # BLD AUTO: 0 K/UL (ref 0–0.04)
IMM GRANULOCYTES # BLD AUTO: 0.1 K/UL (ref 0–0.04)
IMM GRANULOCYTES # BLD AUTO: 0.2 K/UL (ref 0–0.04)
IMM GRANULOCYTES NFR BLD AUTO: 0 %
IMM GRANULOCYTES NFR BLD AUTO: 0 % (ref 0–0.5)
IMM GRANULOCYTES NFR BLD AUTO: 1 % (ref 0–0.5)
IMM GRANULOCYTES NFR BLD AUTO: 2 % (ref 0–0.5)
INR PPP: 1 (ref 0.9–1.1)
INR PPP: 1.1 (ref 0.9–1.1)
INR PPP: 1.2 (ref 0.9–1.1)
INR PPP: 1.3 (ref 0.9–1.1)
INR PPP: 1.5 (ref 0.9–1.1)
INR PPP: 1.5 (ref 0.9–1.1)
INR PPP: 1.8 (ref 0.9–1.1)
INR PPP: 2.3 (ref 0.9–1.1)
INSULIN ADMINSTERED, INSADM: 0.2 UNITS/HOUR
INSULIN ADMINSTERED, INSADM: 0.7 UNITS/HOUR
INSULIN ADMINSTERED, INSADM: 0.9 UNITS/HOUR
INSULIN ADMINSTERED, INSADM: 10.8 UNITS/HOUR
INSULIN ADMINSTERED, INSADM: 15.6 UNITS/HOUR
INSULIN ADMINSTERED, INSADM: 15.7 UNITS/HOUR
INSULIN ADMINSTERED, INSADM: 15.8 UNITS/HOUR
INSULIN ADMINSTERED, INSADM: 17.5 UNITS/HOUR
INSULIN ADMINSTERED, INSADM: 2.2 UNITS/HOUR
INSULIN ADMINSTERED, INSADM: 2.9 UNITS/HOUR
INSULIN ADMINSTERED, INSADM: 20.2 UNITS/HOUR
INSULIN ADMINSTERED, INSADM: 20.2 UNITS/HOUR
INSULIN ADMINSTERED, INSADM: 22.2 UNITS/HOUR
INSULIN ADMINSTERED, INSADM: 22.5 UNITS/HOUR
INSULIN ADMINSTERED, INSADM: 23 UNITS/HOUR
INSULIN ADMINSTERED, INSADM: 6.6 UNITS/HOUR
INSULIN ADMINSTERED, INSADM: 9 UNITS/HOUR
INSULIN ORDER, INSORD: 0.2 UNITS/HOUR
INSULIN ORDER, INSORD: 0.7 UNITS/HOUR
INSULIN ORDER, INSORD: 0.9 UNITS/HOUR
INSULIN ORDER, INSORD: 10.8 UNITS/HOUR
INSULIN ORDER, INSORD: 15.6 UNITS/HOUR
INSULIN ORDER, INSORD: 15.7 UNITS/HOUR
INSULIN ORDER, INSORD: 15.8 UNITS/HOUR
INSULIN ORDER, INSORD: 17.5 UNITS/HOUR
INSULIN ORDER, INSORD: 2.2 UNITS/HOUR
INSULIN ORDER, INSORD: 2.9 UNITS/HOUR
INSULIN ORDER, INSORD: 20.2 UNITS/HOUR
INSULIN ORDER, INSORD: 20.2 UNITS/HOUR
INSULIN ORDER, INSORD: 22.2 UNITS/HOUR
INSULIN ORDER, INSORD: 22.5 UNITS/HOUR
INSULIN ORDER, INSORD: 23 UNITS/HOUR
INSULIN ORDER, INSORD: 6.6 UNITS/HOUR
INSULIN ORDER, INSORD: 9 UNITS/HOUR
L PNEUMO1 AG UR QL IA: NEGATIVE
LACTATE BLD-SCNC: 1.05 MMOL/L (ref 0.4–2)
LACTATE SERPL-SCNC: 10.8 MMOL/L (ref 0.4–2)
LACTATE SERPL-SCNC: 11 MMOL/L (ref 0.4–2)
LACTATE SERPL-SCNC: 11.7 MMOL/L (ref 0.4–2)
LACTATE SERPL-SCNC: 14.3 MMOL/L (ref 0.4–2)
LACTATE SERPL-SCNC: 14.6 MMOL/L (ref 0.4–2)
LACTATE SERPL-SCNC: 16.9 MMOL/L (ref 0.4–2)
LACTATE SERPL-SCNC: 3.2 MMOL/L (ref 0.4–2)
LACTATE SERPL-SCNC: 3.7 MMOL/L (ref 0.4–2)
LACTATE SERPL-SCNC: 3.9 MMOL/L (ref 0.4–2)
LACTATE SERPL-SCNC: 7.9 MMOL/L (ref 0.4–2)
LACTATE SERPL-SCNC: 8.4 MMOL/L (ref 0.4–2)
LACTATE SERPL-SCNC: 8.4 MMOL/L (ref 0.4–2)
LDH SERPL L TO P-CCNC: 1211 U/L (ref 81–246)
LDH SERPL L TO P-CCNC: 289 U/L (ref 81–246)
LDH SERPL L TO P-CCNC: 308 U/L (ref 81–246)
LDH SERPL L TO P-CCNC: 356 U/L (ref 81–246)
LDH SERPL L TO P-CCNC: 356 U/L (ref 81–246)
LDH SERPL L TO P-CCNC: 357 U/L (ref 81–246)
LDH SERPL L TO P-CCNC: 375 U/L (ref 81–246)
LDH SERPL L TO P-CCNC: 376 U/L (ref 81–246)
LDH SERPL L TO P-CCNC: 383 U/L (ref 81–246)
LDH SERPL L TO P-CCNC: 384 U/L (ref 81–246)
LDH SERPL L TO P-CCNC: 397 U/L (ref 81–246)
LDH SERPL L TO P-CCNC: 436 U/L (ref 81–246)
LDH SERPL L TO P-CCNC: 441 U/L (ref 81–246)
LDH SERPL L TO P-CCNC: 444 U/L (ref 81–246)
LDH SERPL L TO P-CCNC: 446 U/L (ref 81–246)
LDH SERPL L TO P-CCNC: 453 U/L (ref 81–246)
LDH SERPL L TO P-CCNC: 711 U/L (ref 81–246)
LDH SERPL L TO P-CCNC: >4000 U/L (ref 81–246)
LOW TARGET, LOT: 200 MG/DL
LVFS 2D: 29.03 %
LVOT MG: 3.43 MMHG
LVOT MV: 0.82 CM/S
LVSV (TEICH): 18.83 ML
LYMPHOCYTES # BLD: 0.9 K/UL (ref 0.8–3.5)
LYMPHOCYTES # BLD: 1 K/UL (ref 0.8–3.5)
LYMPHOCYTES # BLD: 1.5 K/UL (ref 0.8–3.5)
LYMPHOCYTES # BLD: 1.6 K/UL (ref 0.8–3.5)
LYMPHOCYTES # BLD: 1.9 K/UL (ref 0.8–3.5)
LYMPHOCYTES # BLD: 1.9 K/UL (ref 0.8–3.5)
LYMPHOCYTES # BLD: 2.2 K/UL (ref 0.8–3.5)
LYMPHOCYTES # BLD: 2.3 K/UL (ref 0.8–3.5)
LYMPHOCYTES # BLD: 2.4 K/UL (ref 0.8–3.5)
LYMPHOCYTES # BLD: 2.4 K/UL (ref 0.8–3.5)
LYMPHOCYTES # BLD: 2.9 K/UL (ref 0.8–3.5)
LYMPHOCYTES # BLD: 2.9 K/UL (ref 0.8–3.5)
LYMPHOCYTES # BLD: 3.1 K/UL (ref 0.8–3.5)
LYMPHOCYTES # BLD: 3.4 K/UL (ref 0.8–3.5)
LYMPHOCYTES NFR BLD: 10 % (ref 12–49)
LYMPHOCYTES NFR BLD: 11 % (ref 12–49)
LYMPHOCYTES NFR BLD: 12 % (ref 12–49)
LYMPHOCYTES NFR BLD: 14 % (ref 12–49)
LYMPHOCYTES NFR BLD: 17 % (ref 12–49)
LYMPHOCYTES NFR BLD: 17 % (ref 12–49)
LYMPHOCYTES NFR BLD: 20 % (ref 12–49)
LYMPHOCYTES NFR BLD: 20 % (ref 12–49)
LYMPHOCYTES NFR BLD: 27 % (ref 12–49)
LYMPHOCYTES NFR BLD: 28 % (ref 12–49)
LYMPHOCYTES NFR BLD: 8 % (ref 12–49)
LYMPHOCYTES NFR BLD: 8 % (ref 12–49)
LYMPHOCYTES NFR BLD: 9 % (ref 12–49)
LYMPHOCYTES NFR BLD: 9 % (ref 12–49)
Lab: NORMAL
M PNEUMO DNA SPEC QL NAA+PROBE: NOT DETECTED
M PNEUMO IGM SER IA-ACNC: NONREACTIVE
M TB IFN-G BLD-IMP: ABNORMAL
MAGNESIUM SERPL-MCNC: 1.2 MG/DL (ref 1.6–2.4)
MAGNESIUM SERPL-MCNC: 1.8 MG/DL (ref 1.6–2.4)
MAGNESIUM SERPL-MCNC: 1.8 MG/DL (ref 1.6–2.4)
MAGNESIUM SERPL-MCNC: 1.9 MG/DL (ref 1.6–2.4)
MAGNESIUM SERPL-MCNC: 1.9 MG/DL (ref 1.6–2.4)
MAGNESIUM SERPL-MCNC: 2 MG/DL (ref 1.6–2.4)
MAGNESIUM SERPL-MCNC: 2.1 MG/DL (ref 1.6–2.4)
MAGNESIUM SERPL-MCNC: 2.1 MG/DL (ref 1.6–2.4)
MAGNESIUM SERPL-MCNC: 2.2 MG/DL (ref 1.6–2.4)
MAGNESIUM SERPL-MCNC: 2.2 MG/DL (ref 1.6–2.4)
MAGNESIUM SERPL-MCNC: 2.6 MG/DL (ref 1.6–2.4)
MCH RBC QN AUTO: 29 PG (ref 26–34)
MCH RBC QN AUTO: 29 PG (ref 26–34)
MCH RBC QN AUTO: 29.1 PG (ref 26–34)
MCH RBC QN AUTO: 29.3 PG (ref 26–34)
MCH RBC QN AUTO: 29.5 PG (ref 26–34)
MCH RBC QN AUTO: 29.5 PG (ref 26–34)
MCH RBC QN AUTO: 29.7 PG (ref 26–34)
MCH RBC QN AUTO: 29.8 PG (ref 26–34)
MCH RBC QN AUTO: 29.9 PG (ref 26–34)
MCH RBC QN AUTO: 29.9 PG (ref 26–34)
MCH RBC QN AUTO: 30.1 PG (ref 26–34)
MCH RBC QN AUTO: 30.2 PG (ref 26–34)
MCHC RBC AUTO-ENTMCNC: 28.8 G/DL (ref 30–36.5)
MCHC RBC AUTO-ENTMCNC: 30.6 G/DL (ref 30–36.5)
MCHC RBC AUTO-ENTMCNC: 30.7 G/DL (ref 30–36.5)
MCHC RBC AUTO-ENTMCNC: 31.4 G/DL (ref 30–36.5)
MCHC RBC AUTO-ENTMCNC: 31.5 G/DL (ref 30–36.5)
MCHC RBC AUTO-ENTMCNC: 31.5 G/DL (ref 30–36.5)
MCHC RBC AUTO-ENTMCNC: 31.6 G/DL (ref 30–36.5)
MCHC RBC AUTO-ENTMCNC: 31.7 G/DL (ref 30–36.5)
MCHC RBC AUTO-ENTMCNC: 31.8 G/DL (ref 30–36.5)
MCHC RBC AUTO-ENTMCNC: 32 G/DL (ref 30–36.5)
MCHC RBC AUTO-ENTMCNC: 32.3 G/DL (ref 30–36.5)
MCHC RBC AUTO-ENTMCNC: 32.4 G/DL (ref 30–36.5)
MCHC RBC AUTO-ENTMCNC: 32.7 G/DL (ref 30–36.5)
MCHC RBC AUTO-ENTMCNC: 33.7 G/DL (ref 30–36.5)
MCHC RBC AUTO-ENTMCNC: 34.4 G/DL (ref 30–36.5)
MCHC RBC AUTO-ENTMCNC: 34.8 G/DL (ref 30–36.5)
MCV RBC AUTO: 101.6 FL (ref 80–99)
MCV RBC AUTO: 85.3 FL (ref 80–99)
MCV RBC AUTO: 86.6 FL (ref 80–99)
MCV RBC AUTO: 89.1 FL (ref 80–99)
MCV RBC AUTO: 89.6 FL (ref 80–99)
MCV RBC AUTO: 91.8 FL (ref 80–99)
MCV RBC AUTO: 92 FL (ref 80–99)
MCV RBC AUTO: 92.1 FL (ref 80–99)
MCV RBC AUTO: 92.3 FL (ref 80–99)
MCV RBC AUTO: 92.4 FL (ref 80–99)
MCV RBC AUTO: 92.7 FL (ref 80–99)
MCV RBC AUTO: 93.1 FL (ref 80–99)
MCV RBC AUTO: 93.6 FL (ref 80–99)
MCV RBC AUTO: 93.8 FL (ref 80–99)
MCV RBC AUTO: 97.5 FL (ref 80–99)
MCV RBC AUTO: 97.8 FL (ref 80–99)
METAMYELOCYTES NFR BLD MANUAL: 1 %
METAMYELOCYTES NFR BLD MANUAL: 1 %
METAMYELOCYTES NFR BLD MANUAL: 4 %
METAMYELOCYTES NFR BLD MANUAL: 5 %
METAMYELOCYTES NFR BLD MANUAL: 6 %
METHGB MFR BLD: 0.2 % (ref 0–1.4)
METHGB MFR BLD: 0.2 % (ref 0–1.4)
METHGB MFR BLD: 0.3 % (ref 0–1.4)
METHGB MFR BLD: 0.4 % (ref 0–1.4)
MICROALBUMIN UR-MCNC: 13.5 MG/DL
MICROALBUMIN/CREAT UR-RTO: 217 MG/G (ref 0–30)
MINUTES UNTIL NEXT BG, NBG: 60 MIN
MONOCYTES # BLD: 0.2 K/UL (ref 0–1)
MONOCYTES # BLD: 0.2 K/UL (ref 0–1)
MONOCYTES # BLD: 0.6 K/UL (ref 0–1)
MONOCYTES # BLD: 0.7 K/UL (ref 0–1)
MONOCYTES # BLD: 0.7 K/UL (ref 0–1)
MONOCYTES # BLD: 0.8 K/UL (ref 0–1)
MONOCYTES # BLD: 0.9 K/UL (ref 0–1)
MONOCYTES # BLD: 0.9 K/UL (ref 0–1)
MONOCYTES # BLD: 1 K/UL (ref 0–1)
MONOCYTES # BLD: 1.2 K/UL (ref 0–1)
MONOCYTES # BLD: 1.2 K/UL (ref 0–1)
MONOCYTES # BLD: 1.3 K/UL (ref 0–1)
MONOCYTES # BLD: 1.9 K/UL (ref 0–1)
MONOCYTES # BLD: 3.9 K/UL (ref 0–1)
MONOCYTES NFR BLD: 1 % (ref 5–13)
MONOCYTES NFR BLD: 11 % (ref 5–13)
MONOCYTES NFR BLD: 14 % (ref 5–13)
MONOCYTES NFR BLD: 2 % (ref 5–13)
MONOCYTES NFR BLD: 4 % (ref 5–13)
MONOCYTES NFR BLD: 5 % (ref 5–13)
MONOCYTES NFR BLD: 5 % (ref 5–13)
MONOCYTES NFR BLD: 6 % (ref 5–13)
MONOCYTES NFR BLD: 8 % (ref 5–13)
MONOCYTES NFR BLD: 9 % (ref 5–13)
MULTIPLIER, MUL: 0.01
MULTIPLIER, MUL: 0.02
MULTIPLIER, MUL: 0.02
MULTIPLIER, MUL: 0.03
MULTIPLIER, MUL: 0.03
MULTIPLIER, MUL: 0.04
MULTIPLIER, MUL: 0.04
MULTIPLIER, MUL: 0.05
MULTIPLIER, MUL: 0.05
MULTIPLIER, MUL: 0.06
MULTIPLIER, MUL: 0.06
MULTIPLIER, MUL: 0.07
MULTIPLIER, MUL: 0.07
MULTIPLIER, MUL: 0.08
MULTIPLIER, MUL: 0.09
MV DEC SLOPE: 5.19
MYELOCYTES NFR BLD MANUAL: 1 %
MYELOCYTES NFR BLD MANUAL: 2 %
MYELOCYTES NFR BLD MANUAL: 7 %
NEUTS BAND NFR BLD MANUAL: 14 % (ref 0–6)
NEUTS BAND NFR BLD MANUAL: 6 % (ref 0–6)
NEUTS BAND NFR BLD MANUAL: 6 % (ref 0–6)
NEUTS BAND NFR BLD MANUAL: 8 % (ref 0–6)
NEUTS SEG # BLD: 13.6 K/UL (ref 1.8–8)
NEUTS SEG # BLD: 13.9 K/UL (ref 1.8–8)
NEUTS SEG # BLD: 18.1 K/UL (ref 1.8–8)
NEUTS SEG # BLD: 19 K/UL (ref 1.8–8)
NEUTS SEG # BLD: 26.1 K/UL (ref 1.8–8)
NEUTS SEG # BLD: 30.2 K/UL (ref 1.8–8)
NEUTS SEG # BLD: 6 K/UL (ref 1.8–8)
NEUTS SEG # BLD: 6.1 K/UL (ref 1.8–8)
NEUTS SEG # BLD: 6.5 K/UL (ref 1.8–8)
NEUTS SEG # BLD: 6.9 K/UL (ref 1.8–8)
NEUTS SEG # BLD: 7.2 K/UL (ref 1.8–8)
NEUTS SEG # BLD: 7.4 K/UL (ref 1.8–8)
NEUTS SEG # BLD: 9.2 K/UL (ref 1.8–8)
NEUTS SEG # BLD: 9.9 K/UL (ref 1.8–8)
NEUTS SEG NFR BLD: 63 % (ref 32–75)
NEUTS SEG NFR BLD: 64 % (ref 32–75)
NEUTS SEG NFR BLD: 66 % (ref 32–75)
NEUTS SEG NFR BLD: 67 % (ref 32–75)
NEUTS SEG NFR BLD: 68 % (ref 32–75)
NEUTS SEG NFR BLD: 69 % (ref 32–75)
NEUTS SEG NFR BLD: 74 % (ref 32–75)
NEUTS SEG NFR BLD: 78 % (ref 32–75)
NEUTS SEG NFR BLD: 79 % (ref 32–75)
NEUTS SEG NFR BLD: 84 % (ref 32–75)
NEUTS SEG NFR BLD: 84 % (ref 32–75)
NEUTS SEG NFR BLD: 88 % (ref 32–75)
NRBC # BLD: 0 K/UL (ref 0–0.01)
NRBC # BLD: 0.02 K/UL (ref 0–0.01)
NRBC # BLD: 0.31 K/UL (ref 0–0.01)
NRBC # BLD: 0.48 K/UL (ref 0–0.01)
NRBC # BLD: 2.74 K/UL (ref 0–0.01)
NRBC # BLD: 3.63 K/UL (ref 0–0.01)
NRBC # BLD: 4.82 K/UL (ref 0–0.01)
NRBC # BLD: 6.42 K/UL (ref 0–0.01)
NRBC BLD-RTO: 0 PER 100 WBC
NRBC BLD-RTO: 0.1 PER 100 WBC
NRBC BLD-RTO: 0.1 PER 100 WBC
NRBC BLD-RTO: 0.2 PER 100 WBC
NRBC BLD-RTO: 0.2 PER 100 WBC
NRBC BLD-RTO: 1.5 PER 100 WBC
NRBC BLD-RTO: 12.3 PER 100 WBC
NRBC BLD-RTO: 2 PER 100 WBC
NRBC BLD-RTO: 20 PER 100 WBC
NRBC BLD-RTO: 6.8 PER 100 WBC
NRBC BLD-RTO: 8.4 PER 100 WBC
ORDER INITIALS, ORDINIT: NORMAL
OSMOLALITY UR: NORMAL MOSM/KG H2O
OXYHGB MFR BLD: 70 % (ref 94–97)
OXYHGB MFR BLD: 91 % (ref 94–97)
OXYHGB MFR BLD: 95 % (ref 94–97)
OXYHGB MFR BLD: 97 % (ref 94–97)
P-R INTERVAL, ECG05: 156 MS
P-R INTERVAL, ECG05: 164 MS
P-R INTERVAL, ECG05: 164 MS
P-R INTERVAL, ECG05: 170 MS
PATH REV BLD -IMP: ABNORMAL
PCO2 BLDA: 25 MMHG (ref 35–45)
PCO2 BLDA: 31 MMHG (ref 35–45)
PCO2 BLDA: 37 MMHG (ref 35–45)
PCO2 BLDA: 47 MMHG (ref 35–45)
PCO2 BLDA: 81 MMHG (ref 35–45)
PEEP MAX SETTING VENT: 10 CM[H2O]
PH BLDA: 6.8 [PH] (ref 7.35–7.45)
PH BLDA: 6.87 [PH] (ref 7.35–7.45)
PH BLDA: 7.15 [PH] (ref 7.35–7.45)
PH BLDA: 7.35 [PH] (ref 7.35–7.45)
PH BLDA: 7.36 [PH] (ref 7.35–7.45)
PHOSPHATE SERPL-MCNC: 1.7 MG/DL (ref 2.6–4.7)
PHOSPHATE SERPL-MCNC: 2.1 MG/DL (ref 2.6–4.7)
PHOSPHATE SERPL-MCNC: 2.3 MG/DL (ref 2.6–4.7)
PHOSPHATE SERPL-MCNC: 2.6 MG/DL (ref 2.6–4.7)
PHOSPHATE SERPL-MCNC: 2.7 MG/DL (ref 2.6–4.7)
PHOSPHATE SERPL-MCNC: 2.7 MG/DL (ref 2.6–4.7)
PHOSPHATE SERPL-MCNC: 2.8 MG/DL (ref 2.6–4.7)
PHOSPHATE SERPL-MCNC: 3.6 MG/DL (ref 2.6–4.7)
PHOSPHATE SERPL-MCNC: 3.9 MG/DL (ref 2.6–4.7)
PHOSPHATE SERPL-MCNC: 4.3 MG/DL (ref 2.6–4.7)
PHOSPHATE SERPL-MCNC: 4.3 MG/DL (ref 2.6–4.7)
PHOSPHATE SERPL-MCNC: 4.4 MG/DL (ref 2.6–4.7)
PHOSPHATE SERPL-MCNC: 4.5 MG/DL (ref 2.6–4.7)
PHOSPHATE SERPL-MCNC: 7.1 MG/DL (ref 2.6–4.7)
PHOSPHATE SERPL-MCNC: 8.4 MG/DL (ref 2.6–4.7)
PHOSPHATE SERPL-MCNC: 9.5 MG/DL (ref 2.6–4.7)
PLATELET # BLD AUTO: 116 K/UL (ref 150–400)
PLATELET # BLD AUTO: 163 K/UL (ref 150–400)
PLATELET # BLD AUTO: 164 K/UL (ref 150–400)
PLATELET # BLD AUTO: 175 K/UL (ref 150–400)
PLATELET # BLD AUTO: 187 K/UL (ref 150–400)
PLATELET # BLD AUTO: 279 K/UL (ref 150–400)
PLATELET # BLD AUTO: 292 K/UL (ref 150–400)
PLATELET # BLD AUTO: 309 K/UL (ref 150–400)
PLATELET # BLD AUTO: 320 K/UL (ref 150–400)
PLATELET # BLD AUTO: 326 K/UL (ref 150–400)
PLATELET # BLD AUTO: 334 K/UL (ref 150–400)
PLATELET # BLD AUTO: 368 K/UL (ref 150–400)
PLATELET # BLD AUTO: 43 K/UL (ref 150–400)
PLATELET # BLD AUTO: 44 K/UL (ref 150–400)
PLATELET # BLD AUTO: 46 K/UL (ref 150–400)
PLATELET # BLD AUTO: 58 K/UL (ref 150–400)
PLATELET COMMENTS,PCOM: ABNORMAL
PMV BLD AUTO: 10 FL (ref 8.9–12.9)
PMV BLD AUTO: 10.1 FL (ref 8.9–12.9)
PMV BLD AUTO: 10.1 FL (ref 8.9–12.9)
PMV BLD AUTO: 10.3 FL (ref 8.9–12.9)
PMV BLD AUTO: 10.3 FL (ref 8.9–12.9)
PMV BLD AUTO: 10.5 FL (ref 8.9–12.9)
PMV BLD AUTO: 10.7 FL (ref 8.9–12.9)
PMV BLD AUTO: 11.8 FL (ref 8.9–12.9)
PMV BLD AUTO: 12.8 FL (ref 8.9–12.9)
PMV BLD AUTO: 9.4 FL (ref 8.9–12.9)
PMV BLD AUTO: 9.5 FL (ref 8.9–12.9)
PMV BLD AUTO: 9.7 FL (ref 8.9–12.9)
PMV BLD AUTO: 9.9 FL (ref 8.9–12.9)
PMV BLD AUTO: 9.9 FL (ref 8.9–12.9)
PO2 BLDA: 20 MMHG (ref 80–100)
PO2 BLDA: 29 MMHG (ref 80–100)
PO2 BLDA: 45 MMHG (ref 80–100)
PO2 BLDA: 57 MMHG (ref 80–100)
PO2 BLDA: 62 MMHG (ref 80–100)
POTASSIUM SERPL-SCNC: 3.4 MMOL/L (ref 3.5–5.1)
POTASSIUM SERPL-SCNC: 3.5 MMOL/L (ref 3.5–5.1)
POTASSIUM SERPL-SCNC: 3.8 MMOL/L (ref 3.5–5.1)
POTASSIUM SERPL-SCNC: 3.8 MMOL/L (ref 3.5–5.1)
POTASSIUM SERPL-SCNC: 4 MMOL/L (ref 3.5–5.1)
POTASSIUM SERPL-SCNC: 4 MMOL/L (ref 3.5–5.1)
POTASSIUM SERPL-SCNC: 4.1 MMOL/L (ref 3.5–5.1)
POTASSIUM SERPL-SCNC: 4.3 MMOL/L (ref 3.5–5.1)
POTASSIUM SERPL-SCNC: 4.4 MMOL/L (ref 3.5–5.1)
POTASSIUM SERPL-SCNC: 4.4 MMOL/L (ref 3.5–5.1)
POTASSIUM SERPL-SCNC: 4.6 MMOL/L (ref 3.5–5.1)
POTASSIUM SERPL-SCNC: 4.6 MMOL/L (ref 3.5–5.1)
POTASSIUM SERPL-SCNC: 4.7 MMOL/L (ref 3.5–5.1)
POTASSIUM SERPL-SCNC: 4.9 MMOL/L (ref 3.5–5.1)
POTASSIUM SERPL-SCNC: 5 MMOL/L (ref 3.5–5.1)
POTASSIUM SERPL-SCNC: 5.1 MMOL/L (ref 3.5–5.1)
POTASSIUM SERPL-SCNC: 5.1 MMOL/L (ref 3.5–5.1)
POTASSIUM SERPL-SCNC: 5.2 MMOL/L (ref 3.5–5.1)
POTASSIUM SERPL-SCNC: 6.1 MMOL/L (ref 3.5–5.1)
PROCALCITONIN SERPL-MCNC: 0.12 NG/ML
PROT SERPL-MCNC: 4.4 G/DL (ref 6.4–8.2)
PROT SERPL-MCNC: 5.9 G/DL (ref 6.4–8.2)
PROT SERPL-MCNC: 5.9 G/DL (ref 6.4–8.2)
PROT SERPL-MCNC: 6 G/DL (ref 6.4–8.2)
PROT SERPL-MCNC: 6.3 G/DL (ref 6.4–8.2)
PROT SERPL-MCNC: 6.3 G/DL (ref 6.4–8.2)
PROT SERPL-MCNC: 6.4 G/DL (ref 6.4–8.2)
PROT SERPL-MCNC: 6.6 G/DL (ref 6.4–8.2)
PROT SERPL-MCNC: 6.6 G/DL (ref 6.4–8.2)
PROT SERPL-MCNC: 7.8 G/DL (ref 6.4–8.2)
PROTHROMBIN TIME: 10.7 SEC (ref 9–11.1)
PROTHROMBIN TIME: 10.7 SEC (ref 9–11.1)
PROTHROMBIN TIME: 10.9 SEC (ref 9–11.1)
PROTHROMBIN TIME: 11 SEC (ref 9–11.1)
PROTHROMBIN TIME: 11.4 SEC (ref 9–11.1)
PROTHROMBIN TIME: 11.7 SEC (ref 9–11.1)
PROTHROMBIN TIME: 12.3 SEC (ref 9–11.1)
PROTHROMBIN TIME: 12.4 SEC (ref 9–11.1)
PROTHROMBIN TIME: 12.8 SEC (ref 9–11.1)
PROTHROMBIN TIME: 13.1 SEC (ref 9–11.1)
PROTHROMBIN TIME: 13.2 SEC (ref 9–11.1)
PROTHROMBIN TIME: 13.5 SEC (ref 9–11.1)
PROTHROMBIN TIME: 15.1 SEC (ref 9–11.1)
PROTHROMBIN TIME: 15.4 SEC (ref 9–11.1)
PROTHROMBIN TIME: 18.2 SEC (ref 9–11.1)
PROTHROMBIN TIME: 22.4 SEC (ref 9–11.1)
PROTHROMBIN TIME: 9.9 SEC (ref 9–11.1)
PV END DIASTOLIC VELOCITY: 0.8 MMHG
Q-T INTERVAL, ECG07: 334 MS
Q-T INTERVAL, ECG07: 346 MS
Q-T INTERVAL, ECG07: 350 MS
Q-T INTERVAL, ECG07: 350 MS
QRS DURATION, ECG06: 68 MS
QRS DURATION, ECG06: 70 MS
QRS DURATION, ECG06: 70 MS
QRS DURATION, ECG06: 78 MS
QTC CALCULATION (BEZET), ECG08: 420 MS
QTC CALCULATION (BEZET), ECG08: 421 MS
QTC CALCULATION (BEZET), ECG08: 432 MS
QTC CALCULATION (BEZET), ECG08: 432 MS
QUANTIFERON CRITERIA, QFI1T: ABNORMAL
QUANTIFERON MITOGEN VALUE: >10 IU/ML
QUANTIFERON NIL VALUE: >10 IU/ML
QUANTIFERON TB1 AG: 5.11 IU/ML
QUANTIFERON TB2 AG: 3.64 IU/ML
RBC # BLD AUTO: 1.23 M/UL (ref 3.8–5.2)
RBC # BLD AUTO: 2.65 M/UL (ref 3.8–5.2)
RBC # BLD AUTO: 2.86 M/UL (ref 3.8–5.2)
RBC # BLD AUTO: 2.93 M/UL (ref 3.8–5.2)
RBC # BLD AUTO: 2.97 M/UL (ref 3.8–5.2)
RBC # BLD AUTO: 2.99 M/UL (ref 3.8–5.2)
RBC # BLD AUTO: 3.02 M/UL (ref 3.8–5.2)
RBC # BLD AUTO: 3.02 M/UL (ref 3.8–5.2)
RBC # BLD AUTO: 3.15 M/UL (ref 3.8–5.2)
RBC # BLD AUTO: 3.48 M/UL (ref 3.8–5.2)
RBC # BLD AUTO: 3.53 M/UL (ref 3.8–5.2)
RBC # BLD AUTO: 3.62 M/UL (ref 3.8–5.2)
RBC # BLD AUTO: 3.68 M/UL (ref 3.8–5.2)
RBC # BLD AUTO: 3.86 M/UL (ref 3.8–5.2)
RBC # BLD AUTO: 3.94 M/UL (ref 3.8–5.2)
RBC # BLD AUTO: 4.54 M/UL (ref 3.8–5.2)
RBC MORPH BLD: ABNORMAL
REFERENCE LAB,REFLB: NORMAL
RSV RNA SPEC QL NAA+PROBE: NOT DETECTED
RV+EV RNA SPEC QL NAA+PROBE: NOT DETECTED
SAMPLES BEING HELD,HOLD: NORMAL
SAO2 % BLD: 26 % (ref 92–97)
SAO2 % BLD: 69 % (ref 92–97)
SAO2 % BLD: 72 % (ref 95–99)
SAO2 % BLD: 89 % (ref 92–97)
SAO2 % BLD: 91 % (ref 92–97)
SAO2 % BLD: 93 % (ref 95–99)
SAO2 % BLD: 98 % (ref 95–99)
SAO2 % BLD: 99 % (ref 95–99)
SAO2% DEVICE SAO2% SENSOR NAME: ABNORMAL
SERVICE CMNT-IMP: ABNORMAL
SERVICE CMNT-IMP: NORMAL
SODIUM SERPL-SCNC: 132 MMOL/L (ref 136–145)
SODIUM SERPL-SCNC: 133 MMOL/L (ref 136–145)
SODIUM SERPL-SCNC: 136 MMOL/L (ref 136–145)
SODIUM SERPL-SCNC: 136 MMOL/L (ref 136–145)
SODIUM SERPL-SCNC: 137 MMOL/L (ref 136–145)
SODIUM SERPL-SCNC: 138 MMOL/L (ref 136–145)
SODIUM SERPL-SCNC: 139 MMOL/L (ref 136–145)
SODIUM SERPL-SCNC: 140 MMOL/L (ref 136–145)
SODIUM SERPL-SCNC: 141 MMOL/L (ref 136–145)
SODIUM SERPL-SCNC: 142 MMOL/L (ref 136–145)
SODIUM SERPL-SCNC: 143 MMOL/L (ref 136–145)
SODIUM SERPL-SCNC: 143 MMOL/L (ref 136–145)
SODIUM SERPL-SCNC: 144 MMOL/L (ref 136–145)
SODIUM SERPL-SCNC: 145 MMOL/L (ref 136–145)
SODIUM UR-SCNC: 80 MMOL/L
SOURCE, COVRS: ABNORMAL
SP1: NORMAL
SP2: NORMAL
SP3: NORMAL
SPECIMEN EXP DATE BLD: NORMAL
SPECIMEN SITE: ABNORMAL
SPECIMEN SOURCE, FCOV2M: ABNORMAL
SPECIMEN SOURCE: NORMAL
STATUS OF UNIT,%ST: NORMAL
TEST DESCRIPTION:,ATST: NORMAL
THERAPEUTIC RANGE,PTTT: ABNORMAL SECS (ref 58–77)
THERAPEUTIC RANGE,PTTT: NORMAL SECS (ref 58–77)
THERAPEUTIC RANGE,PTTT: NORMAL SECS (ref 58–77)
TROPONIN I SERPL-MCNC: 0.12 NG/ML
TROPONIN I SERPL-MCNC: 0.14 NG/ML
TROPONIN I SERPL-MCNC: 0.14 NG/ML
TROPONIN I SERPL-MCNC: 0.16 NG/ML
UNIT DIVISION, %UDIV: 0
VANCOMYCIN SERPL-MCNC: 16.3 UG/ML
VANCOMYCIN SERPL-MCNC: 18.6 UG/ML
VANCOMYCIN SERPL-MCNC: 23.2 UG/ML
VENTILATION MODE VENT: ABNORMAL
VENTRICULAR RATE, ECG03: 89 BPM
VENTRICULAR RATE, ECG03: 92 BPM
VENTRICULAR RATE, ECG03: 92 BPM
VENTRICULAR RATE, ECG03: 96 BPM
VT SETTING VENT: 450 ML
WBC # BLD AUTO: 10.5 K/UL (ref 3.6–11)
WBC # BLD AUTO: 11 K/UL (ref 3.6–11)
WBC # BLD AUTO: 11 K/UL (ref 3.6–11)
WBC # BLD AUTO: 11.2 K/UL (ref 3.6–11)
WBC # BLD AUTO: 11.3 K/UL (ref 3.6–11)
WBC # BLD AUTO: 16.1 K/UL (ref 3.6–11)
WBC # BLD AUTO: 17.4 K/UL (ref 3.6–11)
WBC # BLD AUTO: 21.1 K/UL (ref 3.6–11)
WBC # BLD AUTO: 24.1 K/UL (ref 3.6–11)
WBC # BLD AUTO: 32.2 K/UL (ref 3.6–11)
WBC # BLD AUTO: 39.3 K/UL (ref 3.6–11)
WBC # BLD AUTO: 40.5 K/UL (ref 3.6–11)
WBC # BLD AUTO: 43.1 K/UL (ref 3.6–11)
WBC # BLD AUTO: 8.8 K/UL (ref 3.6–11)
WBC # BLD AUTO: 9.5 K/UL (ref 3.6–11)
WBC # BLD AUTO: 9.6 K/UL (ref 3.6–11)
WBC MORPH BLD: ABNORMAL

## 2020-01-01 PROCEDURE — 74011250637 HC RX REV CODE- 250/637: Performed by: INTERNAL MEDICINE

## 2020-01-01 PROCEDURE — 77010033678 HC OXYGEN DAILY

## 2020-01-01 PROCEDURE — 36415 COLL VENOUS BLD VENIPUNCTURE: CPT

## 2020-01-01 PROCEDURE — 83036 HEMOGLOBIN GLYCOSYLATED A1C: CPT

## 2020-01-01 PROCEDURE — 99285 EMERGENCY DEPT VISIT HI MDM: CPT

## 2020-01-01 PROCEDURE — 83880 ASSAY OF NATRIURETIC PEPTIDE: CPT

## 2020-01-01 PROCEDURE — 85384 FIBRINOGEN ACTIVITY: CPT

## 2020-01-01 PROCEDURE — 77030005401 HC CATH RAD ARRO -A

## 2020-01-01 PROCEDURE — 74011000250 HC RX REV CODE- 250: Performed by: INTERNAL MEDICINE

## 2020-01-01 PROCEDURE — 83605 ASSAY OF LACTIC ACID: CPT

## 2020-01-01 PROCEDURE — 80202 ASSAY OF VANCOMYCIN: CPT

## 2020-01-01 PROCEDURE — 85379 FIBRIN DEGRADATION QUANT: CPT

## 2020-01-01 PROCEDURE — 85025 COMPLETE CBC W/AUTO DIFF WBC: CPT

## 2020-01-01 PROCEDURE — 85730 THROMBOPLASTIN TIME PARTIAL: CPT

## 2020-01-01 PROCEDURE — 82728 ASSAY OF FERRITIN: CPT

## 2020-01-01 PROCEDURE — 74011000258 HC RX REV CODE- 258: Performed by: INTERNAL MEDICINE

## 2020-01-01 PROCEDURE — 65660000000 HC RM CCU STEPDOWN

## 2020-01-01 PROCEDURE — 80069 RENAL FUNCTION PANEL: CPT

## 2020-01-01 PROCEDURE — 84100 ASSAY OF PHOSPHORUS: CPT

## 2020-01-01 PROCEDURE — 83520 IMMUNOASSAY QUANT NOS NONAB: CPT

## 2020-01-01 PROCEDURE — 94003 VENT MGMT INPAT SUBQ DAY: CPT

## 2020-01-01 PROCEDURE — 74011636637 HC RX REV CODE- 636/637: Performed by: INTERNAL MEDICINE

## 2020-01-01 PROCEDURE — 84145 PROCALCITONIN (PCT): CPT

## 2020-01-01 PROCEDURE — 87040 BLOOD CULTURE FOR BACTERIA: CPT

## 2020-01-01 PROCEDURE — 5A1945Z RESPIRATORY VENTILATION, 24-96 CONSECUTIVE HOURS: ICD-10-PCS | Performed by: STUDENT IN AN ORGANIZED HEALTH CARE EDUCATION/TRAINING PROGRAM

## 2020-01-01 PROCEDURE — 77030027138 HC INCENT SPIROMETER -A

## 2020-01-01 PROCEDURE — 71045 X-RAY EXAM CHEST 1 VIEW: CPT

## 2020-01-01 PROCEDURE — 83615 LACTATE (LD) (LDH) ENZYME: CPT

## 2020-01-01 PROCEDURE — 74011000250 HC RX REV CODE- 250: Performed by: PHYSICIAN ASSISTANT

## 2020-01-01 PROCEDURE — 74011250636 HC RX REV CODE- 250/636: Performed by: INTERNAL MEDICINE

## 2020-01-01 PROCEDURE — C9113 INJ PANTOPRAZOLE SODIUM, VIA: HCPCS | Performed by: INTERNAL MEDICINE

## 2020-01-01 PROCEDURE — 82962 GLUCOSE BLOOD TEST: CPT

## 2020-01-01 PROCEDURE — 86140 C-REACTIVE PROTEIN: CPT

## 2020-01-01 PROCEDURE — 94002 VENT MGMT INPAT INIT DAY: CPT

## 2020-01-01 PROCEDURE — 94760 N-INVAS EAR/PLS OXIMETRY 1: CPT

## 2020-01-01 PROCEDURE — 74011250636 HC RX REV CODE- 250/636: Performed by: PHYSICIAN ASSISTANT

## 2020-01-01 PROCEDURE — 82803 BLOOD GASES ANY COMBINATION: CPT

## 2020-01-01 PROCEDURE — 70450 CT HEAD/BRAIN W/O DYE: CPT

## 2020-01-01 PROCEDURE — 74011000258 HC RX REV CODE- 258: Performed by: PHYSICIAN ASSISTANT

## 2020-01-01 PROCEDURE — 85018 HEMOGLOBIN: CPT

## 2020-01-01 PROCEDURE — 82436 ASSAY OF URINE CHLORIDE: CPT

## 2020-01-01 PROCEDURE — 83735 ASSAY OF MAGNESIUM: CPT

## 2020-01-01 PROCEDURE — 93005 ELECTROCARDIOGRAM TRACING: CPT

## 2020-01-01 PROCEDURE — 77030012794 HC KT NSL CANN/HGH TRAN -A

## 2020-01-01 PROCEDURE — 85610 PROTHROMBIN TIME: CPT

## 2020-01-01 PROCEDURE — 30233N1 TRANSFUSION OF NONAUTOLOGOUS RED BLOOD CELLS INTO PERIPHERAL VEIN, PERCUTANEOUS APPROACH: ICD-10-PCS | Performed by: INTERNAL MEDICINE

## 2020-01-01 PROCEDURE — 84484 ASSAY OF TROPONIN QUANT: CPT

## 2020-01-01 PROCEDURE — 36600 WITHDRAWAL OF ARTERIAL BLOOD: CPT

## 2020-01-01 PROCEDURE — 87635 SARS-COV-2 COVID-19 AMP PRB: CPT

## 2020-01-01 PROCEDURE — 82570 ASSAY OF URINE CREATININE: CPT

## 2020-01-01 PROCEDURE — 80053 COMPREHEN METABOLIC PANEL: CPT

## 2020-01-01 PROCEDURE — 77030005513 HC CATH URETH FOL11 MDII -B

## 2020-01-01 PROCEDURE — 77030041247 HC PROTECTOR HEEL HEELMEDIX MDII -B

## 2020-01-01 PROCEDURE — 82375 ASSAY CARBOXYHB QUANT: CPT

## 2020-01-01 PROCEDURE — 77030013797 HC KT TRNSDUC PRSSR EDWD -A

## 2020-01-01 PROCEDURE — 31500 INSERT EMERGENCY AIRWAY: CPT

## 2020-01-01 PROCEDURE — 65610000006 HC RM INTENSIVE CARE

## 2020-01-01 PROCEDURE — 73502 X-RAY EXAM HIP UNI 2-3 VIEWS: CPT

## 2020-01-01 PROCEDURE — 85027 COMPLETE CBC AUTOMATED: CPT

## 2020-01-01 PROCEDURE — P9016 RBC LEUKOCYTES REDUCED: HCPCS

## 2020-01-01 PROCEDURE — 0100U RESPIRATORY PANEL,PCR,NASOPHARYNGEAL: CPT

## 2020-01-01 PROCEDURE — 86923 COMPATIBILITY TEST ELECTRIC: CPT

## 2020-01-01 PROCEDURE — 86480 TB TEST CELL IMMUN MEASURE: CPT

## 2020-01-01 PROCEDURE — 05HM33Z INSERTION OF INFUSION DEVICE INTO RIGHT INTERNAL JUGULAR VEIN, PERCUTANEOUS APPROACH: ICD-10-PCS | Performed by: ANESTHESIOLOGY

## 2020-01-01 PROCEDURE — 76770 US EXAM ABDO BACK WALL COMP: CPT

## 2020-01-01 PROCEDURE — 82043 UR ALBUMIN QUANTITATIVE: CPT

## 2020-01-01 PROCEDURE — 87389 HIV-1 AG W/HIV-1&-2 AB AG IA: CPT

## 2020-01-01 PROCEDURE — 74011250637 HC RX REV CODE- 250/637: Performed by: PHYSICIAN ASSISTANT

## 2020-01-01 PROCEDURE — 93970 EXTREMITY STUDY: CPT

## 2020-01-01 PROCEDURE — 84300 ASSAY OF URINE SODIUM: CPT

## 2020-01-01 PROCEDURE — 36430 TRANSFUSION BLD/BLD COMPNT: CPT

## 2020-01-01 PROCEDURE — 93306 TTE W/DOPPLER COMPLETE: CPT

## 2020-01-01 PROCEDURE — 74011250637 HC RX REV CODE- 250/637: Performed by: NURSE PRACTITIONER

## 2020-01-01 PROCEDURE — 77030008771 HC TU NG SALEM SUMP -A

## 2020-01-01 PROCEDURE — 74011250636 HC RX REV CODE- 250/636

## 2020-01-01 PROCEDURE — 86738 MYCOPLASMA ANTIBODY: CPT

## 2020-01-01 PROCEDURE — 0BH17EZ INSERTION OF ENDOTRACHEAL AIRWAY INTO TRACHEA, VIA NATURAL OR ARTIFICIAL OPENING: ICD-10-PCS | Performed by: STUDENT IN AN ORGANIZED HEALTH CARE EDUCATION/TRAINING PROGRAM

## 2020-01-01 PROCEDURE — 86900 BLOOD TYPING SEROLOGIC ABO: CPT

## 2020-01-01 PROCEDURE — 82550 ASSAY OF CK (CPK): CPT

## 2020-01-01 PROCEDURE — 80074 ACUTE HEPATITIS PANEL: CPT

## 2020-01-01 PROCEDURE — 87449 NOS EACH ORGANISM AG IA: CPT

## 2020-01-01 PROCEDURE — P9047 ALBUMIN (HUMAN), 25%, 50ML: HCPCS | Performed by: INTERNAL MEDICINE

## 2020-01-01 PROCEDURE — 84460 ALANINE AMINO (ALT) (SGPT): CPT

## 2020-01-01 PROCEDURE — 74018 RADEX ABDOMEN 1 VIEW: CPT

## 2020-01-01 PROCEDURE — 83935 ASSAY OF URINE OSMOLALITY: CPT

## 2020-01-01 PROCEDURE — 80048 BASIC METABOLIC PNL TOTAL CA: CPT

## 2020-01-01 PROCEDURE — 77030019896 HC KT ARTERIAL LN TELE -B

## 2020-01-01 PROCEDURE — 82248 BILIRUBIN DIRECT: CPT

## 2020-01-01 PROCEDURE — 80076 HEPATIC FUNCTION PANEL: CPT

## 2020-01-01 RX ORDER — INSULIN LISPRO 100 [IU]/ML
INJECTION, SOLUTION INTRAVENOUS; SUBCUTANEOUS
Status: DISCONTINUED | OUTPATIENT
Start: 2020-01-01 | End: 2020-01-01

## 2020-01-01 RX ORDER — GLYCOPYRROLATE 0.2 MG/ML
0.2 INJECTION INTRAMUSCULAR; INTRAVENOUS
Status: DISCONTINUED | OUTPATIENT
Start: 2020-01-01 | End: 2020-01-01 | Stop reason: HOSPADM

## 2020-01-01 RX ORDER — POTASSIUM CHLORIDE 750 MG/1
20 TABLET, FILM COATED, EXTENDED RELEASE ORAL
Status: COMPLETED | OUTPATIENT
Start: 2020-01-01 | End: 2020-01-01

## 2020-01-01 RX ORDER — INSULIN LISPRO 100 [IU]/ML
INJECTION, SOLUTION INTRAVENOUS; SUBCUTANEOUS EVERY 6 HOURS
Status: DISCONTINUED | OUTPATIENT
Start: 2020-01-01 | End: 2020-01-01

## 2020-01-01 RX ORDER — ACETAMINOPHEN 325 MG/1
650 TABLET ORAL
Status: DISCONTINUED | OUTPATIENT
Start: 2020-01-01 | End: 2020-01-01 | Stop reason: HOSPADM

## 2020-01-01 RX ORDER — NALOXONE HYDROCHLORIDE 0.4 MG/ML
0.4 INJECTION, SOLUTION INTRAMUSCULAR; INTRAVENOUS; SUBCUTANEOUS AS NEEDED
Status: DISCONTINUED | OUTPATIENT
Start: 2020-01-01 | End: 2020-01-01

## 2020-01-01 RX ORDER — OXYCODONE AND ACETAMINOPHEN 5; 325 MG/1; MG/1
2 TABLET ORAL
Status: DISCONTINUED | OUTPATIENT
Start: 2020-01-01 | End: 2020-01-01

## 2020-01-01 RX ORDER — POLYETHYLENE GLYCOL 3350 17 G/17G
17 POWDER, FOR SOLUTION ORAL 2 TIMES DAILY
Status: DISCONTINUED | OUTPATIENT
Start: 2020-01-01 | End: 2020-01-01

## 2020-01-01 RX ORDER — SODIUM CHLORIDE 9 MG/ML
250 INJECTION, SOLUTION INTRAVENOUS AS NEEDED
Status: DISCONTINUED | OUTPATIENT
Start: 2020-01-01 | End: 2020-01-01

## 2020-01-01 RX ORDER — INSULIN LISPRO 100 [IU]/ML
22 INJECTION, SOLUTION INTRAVENOUS; SUBCUTANEOUS ONCE
Status: COMPLETED | OUTPATIENT
Start: 2020-01-01 | End: 2020-01-01

## 2020-01-01 RX ORDER — SODIUM CHLORIDE 0.9 % (FLUSH) 0.9 %
5-40 SYRINGE (ML) INJECTION EVERY 8 HOURS
Status: DISCONTINUED | OUTPATIENT
Start: 2020-01-01 | End: 2020-01-01 | Stop reason: HOSPADM

## 2020-01-01 RX ORDER — LISINOPRIL 5 MG/1
5 TABLET ORAL DAILY
COMMUNITY

## 2020-01-01 RX ORDER — ENOXAPARIN SODIUM 100 MG/ML
40 INJECTION SUBCUTANEOUS EVERY 24 HOURS
Status: DISCONTINUED | OUTPATIENT
Start: 2020-01-01 | End: 2020-01-01

## 2020-01-01 RX ORDER — HEPARIN SODIUM 5000 [USP'U]/ML
5000 INJECTION, SOLUTION INTRAVENOUS; SUBCUTANEOUS EVERY 8 HOURS
Status: DISCONTINUED | OUTPATIENT
Start: 2020-01-01 | End: 2020-01-01

## 2020-01-01 RX ORDER — DEXTROSE 50 % IN WATER (D50W) INTRAVENOUS SYRINGE
12.5-25 AS NEEDED
Status: DISCONTINUED | OUTPATIENT
Start: 2020-01-01 | End: 2020-01-01

## 2020-01-01 RX ORDER — MAGNESIUM SULFATE 100 %
4 CRYSTALS MISCELLANEOUS AS NEEDED
Status: DISCONTINUED | OUTPATIENT
Start: 2020-01-01 | End: 2020-01-01

## 2020-01-01 RX ORDER — MIDAZOLAM HYDROCHLORIDE 5 MG/ML
1 INJECTION INTRAMUSCULAR; INTRAVENOUS
Status: DISCONTINUED | OUTPATIENT
Start: 2020-01-01 | End: 2020-01-01 | Stop reason: HOSPADM

## 2020-01-01 RX ORDER — GUAIFENESIN 100 MG/5ML
324 LIQUID (ML) ORAL DAILY
Status: DISCONTINUED | OUTPATIENT
Start: 2020-01-01 | End: 2020-01-01

## 2020-01-01 RX ORDER — NOREPINEPHRINE BITARTRATE/D5W 8 MG/250ML
.5-2 PLASTIC BAG, INJECTION (ML) INTRAVENOUS
Status: DISCONTINUED | OUTPATIENT
Start: 2020-01-01 | End: 2020-01-01

## 2020-01-01 RX ORDER — SODIUM CHLORIDE 9 MG/ML
250 INJECTION, SOLUTION INTRAVENOUS AS NEEDED
Status: DISCONTINUED | OUTPATIENT
Start: 2020-01-01 | End: 2020-01-01 | Stop reason: HOSPADM

## 2020-01-01 RX ORDER — PREDNISONE 5 MG/1
10 TABLET ORAL DAILY
COMMUNITY
Start: 2020-01-01 | End: 2020-07-02

## 2020-01-01 RX ORDER — HYDROMORPHONE HYDROCHLORIDE 1 MG/ML
0.5 INJECTION, SOLUTION INTRAMUSCULAR; INTRAVENOUS; SUBCUTANEOUS ONCE
Status: COMPLETED | OUTPATIENT
Start: 2020-01-01 | End: 2020-01-01

## 2020-01-01 RX ORDER — ZINC SULFATE 50(220)MG
1 CAPSULE ORAL DAILY
Status: DISCONTINUED | OUTPATIENT
Start: 2020-01-01 | End: 2020-01-01

## 2020-01-01 RX ORDER — DEXAMETHASONE 4 MG/1
10 TABLET ORAL DAILY
Status: DISCONTINUED | OUTPATIENT
Start: 2020-01-01 | End: 2020-01-01

## 2020-01-01 RX ORDER — CALCIUM GLUCONATE 20 MG/ML
1 INJECTION, SOLUTION INTRAVENOUS ONCE
Status: DISCONTINUED | OUTPATIENT
Start: 2020-01-01 | End: 2020-01-01

## 2020-01-01 RX ORDER — OXYCODONE AND ACETAMINOPHEN 5; 325 MG/1; MG/1
1 TABLET ORAL
Status: DISCONTINUED | OUTPATIENT
Start: 2020-01-01 | End: 2020-01-01

## 2020-01-01 RX ORDER — PROPOFOL 10 MG/ML
INJECTION, EMULSION INTRAVENOUS
Status: COMPLETED
Start: 2020-01-01 | End: 2020-01-01

## 2020-01-01 RX ORDER — FUROSEMIDE 10 MG/ML
20 INJECTION INTRAMUSCULAR; INTRAVENOUS ONCE
Status: COMPLETED | OUTPATIENT
Start: 2020-01-01 | End: 2020-01-01

## 2020-01-01 RX ORDER — SODIUM BICARBONATE 1 MEQ/ML
100 SYRINGE (ML) INTRAVENOUS ONCE
Status: COMPLETED | OUTPATIENT
Start: 2020-01-01 | End: 2020-01-01

## 2020-01-01 RX ORDER — MORPHINE SULFATE 2 MG/ML
2 INJECTION, SOLUTION INTRAMUSCULAR; INTRAVENOUS
Status: DISCONTINUED | OUTPATIENT
Start: 2020-01-01 | End: 2020-01-01

## 2020-01-01 RX ORDER — SODIUM CHLORIDE 9 MG/ML
125 INJECTION, SOLUTION INTRAVENOUS CONTINUOUS
Status: DISCONTINUED | OUTPATIENT
Start: 2020-01-01 | End: 2020-01-01

## 2020-01-01 RX ORDER — FAMOTIDINE 20 MG/1
20 TABLET, FILM COATED ORAL 2 TIMES DAILY
Status: DISCONTINUED | OUTPATIENT
Start: 2020-01-01 | End: 2020-01-01

## 2020-01-01 RX ORDER — METRONIDAZOLE 500 MG/100ML
500 INJECTION, SOLUTION INTRAVENOUS EVERY 12 HOURS
Status: DISCONTINUED | OUTPATIENT
Start: 2020-01-01 | End: 2020-01-01

## 2020-01-01 RX ORDER — AMOXICILLIN 250 MG
1 CAPSULE ORAL DAILY
Status: DISCONTINUED | OUTPATIENT
Start: 2020-01-01 | End: 2020-01-01

## 2020-01-01 RX ORDER — SODIUM BICARBONATE 650 MG/1
650 TABLET ORAL 2 TIMES DAILY
Status: DISCONTINUED | OUTPATIENT
Start: 2020-01-01 | End: 2020-01-01

## 2020-01-01 RX ORDER — HEPARIN SODIUM 10000 [USP'U]/100ML
4-36 INJECTION, SOLUTION INTRAVENOUS
Status: DISCONTINUED | OUTPATIENT
Start: 2020-01-01 | End: 2020-01-01

## 2020-01-01 RX ORDER — DEXTROSE 50 % IN WATER (D50W) INTRAVENOUS SYRINGE
25-50 AS NEEDED
Status: DISCONTINUED | OUTPATIENT
Start: 2020-01-01 | End: 2020-01-01

## 2020-01-01 RX ORDER — SODIUM CHLORIDE 9 MG/ML
100 INJECTION, SOLUTION INTRAVENOUS CONTINUOUS
Status: DISCONTINUED | OUTPATIENT
Start: 2020-01-01 | End: 2020-01-01

## 2020-01-01 RX ORDER — GUAIFENESIN 600 MG/1
1200 TABLET, EXTENDED RELEASE ORAL 2 TIMES DAILY
Status: DISCONTINUED | OUTPATIENT
Start: 2020-01-01 | End: 2020-01-01

## 2020-01-01 RX ORDER — MAGNESIUM SULFATE HEPTAHYDRATE 40 MG/ML
2 INJECTION, SOLUTION INTRAVENOUS ONCE
Status: COMPLETED | OUTPATIENT
Start: 2020-01-01 | End: 2020-01-01

## 2020-01-01 RX ORDER — HEPARIN SODIUM 5000 [USP'U]/ML
40 INJECTION, SOLUTION INTRAVENOUS; SUBCUTANEOUS ONCE
Status: COMPLETED | OUTPATIENT
Start: 2020-01-01 | End: 2020-01-01

## 2020-01-01 RX ORDER — CALCIUM GLUCONATE 20 MG/ML
1 INJECTION, SOLUTION INTRAVENOUS ONCE
Status: COMPLETED | OUTPATIENT
Start: 2020-01-01 | End: 2020-01-01

## 2020-01-01 RX ORDER — ATORVASTATIN CALCIUM 20 MG/1
20 TABLET, FILM COATED ORAL DAILY
Status: DISCONTINUED | OUTPATIENT
Start: 2020-01-01 | End: 2020-01-01

## 2020-01-01 RX ORDER — ONDANSETRON 2 MG/ML
4 INJECTION INTRAMUSCULAR; INTRAVENOUS
Status: DISCONTINUED | OUTPATIENT
Start: 2020-01-01 | End: 2020-01-01 | Stop reason: HOSPADM

## 2020-01-01 RX ORDER — CALCIUM GLUCONATE 20 MG/ML
2 INJECTION, SOLUTION INTRAVENOUS ONCE
Status: COMPLETED | OUTPATIENT
Start: 2020-01-01 | End: 2020-01-01

## 2020-01-01 RX ORDER — ASCORBIC ACID 500 MG
500 TABLET ORAL 2 TIMES DAILY
Status: DISCONTINUED | OUTPATIENT
Start: 2020-01-01 | End: 2020-01-01

## 2020-01-01 RX ORDER — FAMOTIDINE 20 MG/1
20 TABLET, FILM COATED ORAL EVERY EVENING
Status: DISCONTINUED | OUTPATIENT
Start: 2020-01-01 | End: 2020-01-01

## 2020-01-01 RX ORDER — INSULIN LISPRO 100 [IU]/ML
20 INJECTION, SOLUTION INTRAVENOUS; SUBCUTANEOUS ONCE
Status: COMPLETED | OUTPATIENT
Start: 2020-01-01 | End: 2020-01-01

## 2020-01-01 RX ORDER — ATORVASTATIN CALCIUM 20 MG/1
40 TABLET, FILM COATED ORAL DAILY
Status: DISCONTINUED | OUTPATIENT
Start: 2020-01-01 | End: 2020-01-01

## 2020-01-01 RX ORDER — ALBUMIN HUMAN 250 G/1000ML
25 SOLUTION INTRAVENOUS ONCE
Status: COMPLETED | OUTPATIENT
Start: 2020-01-01 | End: 2020-01-01

## 2020-01-01 RX ORDER — HEPARIN SODIUM 10000 [USP'U]/100ML
18-36 INJECTION, SOLUTION INTRAVENOUS
Status: DISCONTINUED | OUTPATIENT
Start: 2020-01-01 | End: 2020-01-01

## 2020-01-01 RX ORDER — SODIUM CHLORIDE 0.9 % (FLUSH) 0.9 %
5-40 SYRINGE (ML) INJECTION AS NEEDED
Status: DISCONTINUED | OUTPATIENT
Start: 2020-01-01 | End: 2020-01-01 | Stop reason: HOSPADM

## 2020-01-01 RX ORDER — PROPOFOL 10 MG/ML
0-50 VIAL (ML) INTRAVENOUS
Status: DISCONTINUED | OUTPATIENT
Start: 2020-01-01 | End: 2020-01-01

## 2020-01-01 RX ORDER — SODIUM POLYSTYRENE SULFONATE 15 G/60ML
30 SUSPENSION ORAL; RECTAL ONCE
Status: COMPLETED | OUTPATIENT
Start: 2020-01-01 | End: 2020-01-01

## 2020-01-01 RX ORDER — INSULIN GLARGINE 100 [IU]/ML
15 INJECTION, SOLUTION SUBCUTANEOUS DAILY
Status: DISCONTINUED | OUTPATIENT
Start: 2020-01-01 | End: 2020-01-01

## 2020-01-01 RX ORDER — AMLODIPINE BESYLATE 5 MG/1
5 TABLET ORAL DAILY
Status: DISCONTINUED | OUTPATIENT
Start: 2020-01-01 | End: 2020-01-01

## 2020-01-01 RX ORDER — GLIPIZIDE 5 MG/1
5 TABLET ORAL DAILY
Status: ON HOLD | COMMUNITY
End: 2020-01-01

## 2020-01-01 RX ADMIN — POLYETHYLENE GLYCOL 3350 17 G: 17 POWDER, FOR SOLUTION ORAL at 09:17

## 2020-01-01 RX ADMIN — Medication 1 CAPSULE: at 08:44

## 2020-01-01 RX ADMIN — NOREPINEPHRINE BITARTRATE 200 MCG/MIN: 1 INJECTION INTRAVENOUS at 20:16

## 2020-01-01 RX ADMIN — Medication 10 ML: at 08:18

## 2020-01-01 RX ADMIN — OXYCODONE HYDROCHLORIDE AND ACETAMINOPHEN 500 MG: 500 TABLET ORAL at 18:21

## 2020-01-01 RX ADMIN — PANTOPRAZOLE SODIUM 40 MG: 40 INJECTION, POWDER, FOR SOLUTION INTRAVENOUS at 09:41

## 2020-01-01 RX ADMIN — Medication 10 ML: at 23:29

## 2020-01-01 RX ADMIN — INSULIN LISPRO 20 UNITS: 100 INJECTION, SOLUTION INTRAVENOUS; SUBCUTANEOUS at 16:59

## 2020-01-01 RX ADMIN — MIDAZOLAM HYDROCHLORIDE 1 MG: 5 INJECTION INTRAMUSCULAR; INTRAVENOUS at 15:49

## 2020-01-01 RX ADMIN — VASOPRESSIN 0.04 UNITS/MIN: 20 INJECTION INTRAVENOUS at 09:10

## 2020-01-01 RX ADMIN — OXYCODONE HYDROCHLORIDE AND ACETAMINOPHEN 500 MG: 500 TABLET ORAL at 18:08

## 2020-01-01 RX ADMIN — NOREPINEPHRINE BITARTRATE 175 MCG/MIN: 1 INJECTION INTRAVENOUS at 12:04

## 2020-01-01 RX ADMIN — MINERAL OIL, PETROLATUM, PHENYLEPHRINE HCL: 14; 74.9; .25 OINTMENT RECTAL at 17:03

## 2020-01-01 RX ADMIN — NOREPINEPHRINE BITARTRATE 80 MCG/MIN: 1 INJECTION INTRAVENOUS at 23:52

## 2020-01-01 RX ADMIN — CEFTRIAXONE SODIUM 1 G: 1 INJECTION, POWDER, FOR SOLUTION INTRAMUSCULAR; INTRAVENOUS at 13:41

## 2020-01-01 RX ADMIN — Medication 10 ML: at 21:17

## 2020-01-01 RX ADMIN — ASPIRIN 81 MG 324 MG: 81 TABLET ORAL at 08:50

## 2020-01-01 RX ADMIN — Medication 10 ML: at 22:00

## 2020-01-01 RX ADMIN — VASOPRESSIN 0.05 UNITS/MIN: 20 INJECTION INTRAVENOUS at 02:03

## 2020-01-01 RX ADMIN — GUAIFENESIN 1200 MG: 600 TABLET ORAL at 06:54

## 2020-01-01 RX ADMIN — VASOPRESSIN 0.04 UNITS/MIN: 20 INJECTION INTRAVENOUS at 12:04

## 2020-01-01 RX ADMIN — OXYCODONE HYDROCHLORIDE AND ACETAMINOPHEN 500 MG: 500 TABLET ORAL at 18:00

## 2020-01-01 RX ADMIN — Medication 10 ML: at 06:08

## 2020-01-01 RX ADMIN — HEPARIN SODIUM 5000 UNITS: 5000 INJECTION INTRAVENOUS; SUBCUTANEOUS at 14:00

## 2020-01-01 RX ADMIN — GUAIFENESIN 1200 MG: 600 TABLET ORAL at 18:21

## 2020-01-01 RX ADMIN — INSULIN LISPRO 12 UNITS: 100 INJECTION, SOLUTION INTRAVENOUS; SUBCUTANEOUS at 23:36

## 2020-01-01 RX ADMIN — MAGNESIUM SULFATE HEPTAHYDRATE 2 G: 40 INJECTION, SOLUTION INTRAVENOUS at 09:39

## 2020-01-01 RX ADMIN — Medication 10 ML: at 22:40

## 2020-01-01 RX ADMIN — OXYCODONE HYDROCHLORIDE AND ACETAMINOPHEN 500 MG: 500 TABLET ORAL at 18:03

## 2020-01-01 RX ADMIN — GUAIFENESIN 1200 MG: 600 TABLET ORAL at 18:03

## 2020-01-01 RX ADMIN — Medication 10 ML: at 05:25

## 2020-01-01 RX ADMIN — DEXAMETHASONE 10 MG: 4 TABLET ORAL at 14:55

## 2020-01-01 RX ADMIN — NOREPINEPHRINE BITARTRATE 175 MCG/MIN: 1 INJECTION INTRAVENOUS at 23:22

## 2020-01-01 RX ADMIN — PHENYLEPHRINE HYDROCHLORIDE 275 MCG/MIN: 10 INJECTION INTRAVENOUS at 18:40

## 2020-01-01 RX ADMIN — GUAIFENESIN 1200 MG: 600 TABLET ORAL at 05:58

## 2020-01-01 RX ADMIN — ONDANSETRON 4 MG: 2 INJECTION INTRAMUSCULAR; INTRAVENOUS at 10:33

## 2020-01-01 RX ADMIN — Medication 1 CAPSULE: at 08:54

## 2020-01-01 RX ADMIN — AZITHROMYCIN 500 MG: 500 INJECTION, POWDER, LYOPHILIZED, FOR SOLUTION INTRAVENOUS at 14:59

## 2020-01-01 RX ADMIN — Medication 10 ML: at 14:14

## 2020-01-01 RX ADMIN — PHENYLEPHRINE HYDROCHLORIDE 300 MCG/MIN: 10 INJECTION INTRAVENOUS at 00:12

## 2020-01-01 RX ADMIN — SODIUM BICARBONATE: 84 INJECTION, SOLUTION INTRAVENOUS at 22:00

## 2020-01-01 RX ADMIN — MIDAZOLAM HYDROCHLORIDE 1 MG: 5 INJECTION INTRAMUSCULAR; INTRAVENOUS at 12:35

## 2020-01-01 RX ADMIN — Medication 50 MCG/HR: at 20:25

## 2020-01-01 RX ADMIN — NOREPINEPHRINE BITARTRATE 180 MCG/MIN: 1 INJECTION INTRAVENOUS at 17:16

## 2020-01-01 RX ADMIN — SODIUM BICARBONATE: 84 INJECTION, SOLUTION INTRAVENOUS at 05:36

## 2020-01-01 RX ADMIN — POTASSIUM CHLORIDE 20 MEQ: 750 TABLET, FILM COATED, EXTENDED RELEASE ORAL at 13:27

## 2020-01-01 RX ADMIN — OXYCODONE HYDROCHLORIDE AND ACETAMINOPHEN 500 MG: 500 TABLET ORAL at 10:25

## 2020-01-01 RX ADMIN — Medication 10 ML: at 15:38

## 2020-01-01 RX ADMIN — GUAIFENESIN 1200 MG: 600 TABLET ORAL at 05:25

## 2020-01-01 RX ADMIN — NOREPINEPHRINE BITARTRATE 175 MCG/MIN: 1 INJECTION INTRAVENOUS at 13:52

## 2020-01-01 RX ADMIN — ATORVASTATIN CALCIUM 40 MG: 20 TABLET, FILM COATED ORAL at 09:25

## 2020-01-01 RX ADMIN — Medication 10 ML: at 08:47

## 2020-01-01 RX ADMIN — Medication 10 ML: at 09:41

## 2020-01-01 RX ADMIN — PHENYLEPHRINE HYDROCHLORIDE 270 MCG/MIN: 10 INJECTION INTRAVENOUS at 18:15

## 2020-01-01 RX ADMIN — GUAIFENESIN 1200 MG: 600 TABLET ORAL at 06:12

## 2020-01-01 RX ADMIN — VASOPRESSIN 0.05 UNITS/MIN: 20 INJECTION INTRAVENOUS at 09:06

## 2020-01-01 RX ADMIN — AMLODIPINE BESYLATE 5 MG: 5 TABLET ORAL at 08:17

## 2020-01-01 RX ADMIN — PANTOPRAZOLE SODIUM 40 MG: 40 INJECTION, POWDER, FOR SOLUTION INTRAVENOUS at 21:39

## 2020-01-01 RX ADMIN — MIDAZOLAM HYDROCHLORIDE 1 MG: 5 INJECTION INTRAMUSCULAR; INTRAVENOUS at 17:09

## 2020-01-01 RX ADMIN — ACETAMINOPHEN 650 MG: 325 TABLET ORAL at 13:44

## 2020-01-01 RX ADMIN — Medication 10 ML: at 06:00

## 2020-01-01 RX ADMIN — Medication 1 CAPSULE: at 09:21

## 2020-01-01 RX ADMIN — AZITHROMYCIN 500 MG: 500 INJECTION, POWDER, LYOPHILIZED, FOR SOLUTION INTRAVENOUS at 13:41

## 2020-01-01 RX ADMIN — SODIUM BICARBONATE 650 MG: 650 TABLET ORAL at 12:40

## 2020-01-01 RX ADMIN — GUAIFENESIN 1200 MG: 600 TABLET ORAL at 06:00

## 2020-01-01 RX ADMIN — GUAIFENESIN 1200 MG: 600 TABLET ORAL at 06:50

## 2020-01-01 RX ADMIN — OXYCODONE HYDROCHLORIDE AND ACETAMINOPHEN 2 TABLET: 5; 325 TABLET ORAL at 17:24

## 2020-01-01 RX ADMIN — Medication 10 ML: at 21:06

## 2020-01-01 RX ADMIN — GUAIFENESIN 1200 MG: 600 TABLET ORAL at 17:04

## 2020-01-01 RX ADMIN — NOREPINEPHRINE BITARTRATE 200 MCG/MIN: 1 INJECTION INTRAVENOUS at 14:02

## 2020-01-01 RX ADMIN — VASOPRESSIN 0.04 UNITS/MIN: 20 INJECTION INTRAVENOUS at 02:54

## 2020-01-01 RX ADMIN — Medication 10 ML: at 13:47

## 2020-01-01 RX ADMIN — ACETAMINOPHEN 650 MG: 325 TABLET ORAL at 05:12

## 2020-01-01 RX ADMIN — ENOXAPARIN SODIUM 40 MG: 40 INJECTION SUBCUTANEOUS at 18:58

## 2020-01-01 RX ADMIN — OXYCODONE HYDROCHLORIDE AND ACETAMINOPHEN 500 MG: 500 TABLET ORAL at 17:21

## 2020-01-01 RX ADMIN — SODIUM CHLORIDE 15.8 UNITS/HR: 900 INJECTION, SOLUTION INTRAVENOUS at 06:18

## 2020-01-01 RX ADMIN — AZITHROMYCIN 500 MG: 500 INJECTION, POWDER, LYOPHILIZED, FOR SOLUTION INTRAVENOUS at 14:13

## 2020-01-01 RX ADMIN — VASOPRESSIN 0.04 UNITS/MIN: 20 INJECTION INTRAVENOUS at 03:32

## 2020-01-01 RX ADMIN — NOREPINEPHRINE BITARTRATE 175 MCG/MIN: 1 INJECTION INTRAVENOUS at 16:51

## 2020-01-01 RX ADMIN — HEPARIN SODIUM 5000 UNITS: 5000 INJECTION INTRAVENOUS; SUBCUTANEOUS at 14:13

## 2020-01-01 RX ADMIN — SODIUM BICARBONATE: 84 INJECTION, SOLUTION INTRAVENOUS at 14:52

## 2020-01-01 RX ADMIN — Medication 1 CAPSULE: at 08:50

## 2020-01-01 RX ADMIN — ALBUMIN (HUMAN) 25 G: 0.25 INJECTION, SOLUTION INTRAVENOUS at 22:01

## 2020-01-01 RX ADMIN — Medication 1 CAPSULE: at 09:00

## 2020-01-01 RX ADMIN — ATORVASTATIN CALCIUM 40 MG: 20 TABLET, FILM COATED ORAL at 09:21

## 2020-01-01 RX ADMIN — Medication 10 ML: at 22:25

## 2020-01-01 RX ADMIN — INSULIN HUMAN 10 UNITS: 100 INJECTION, SUSPENSION SUBCUTANEOUS at 09:48

## 2020-01-01 RX ADMIN — HEPARIN SODIUM 6.21 UNITS/KG/HR: 10000 INJECTION, SOLUTION INTRAVENOUS at 06:18

## 2020-01-01 RX ADMIN — SODIUM CHLORIDE 1000 ML: 900 INJECTION, SOLUTION INTRAVENOUS at 20:00

## 2020-01-01 RX ADMIN — FUROSEMIDE 20 MG: 10 INJECTION, SOLUTION INTRAMUSCULAR; INTRAVENOUS at 12:17

## 2020-01-01 RX ADMIN — FAMOTIDINE 20 MG: 20 TABLET, FILM COATED ORAL at 18:00

## 2020-01-01 RX ADMIN — Medication 10 ML: at 13:27

## 2020-01-01 RX ADMIN — NOREPINEPHRINE BITARTRATE 200 MCG/MIN: 1 INJECTION INTRAVENOUS at 02:02

## 2020-01-01 RX ADMIN — AMLODIPINE BESYLATE 5 MG: 5 TABLET ORAL at 08:31

## 2020-01-01 RX ADMIN — REMDESIVIR 100 MG: 5 INJECTION INTRAVENOUS at 17:10

## 2020-01-01 RX ADMIN — POTASSIUM CHLORIDE 20 MEQ: 750 TABLET, FILM COATED, EXTENDED RELEASE ORAL at 09:38

## 2020-01-01 RX ADMIN — INSULIN LISPRO 3 UNITS: 100 INJECTION, SOLUTION INTRAVENOUS; SUBCUTANEOUS at 06:34

## 2020-01-01 RX ADMIN — Medication 100 MCG/HR: at 11:09

## 2020-01-01 RX ADMIN — INSULIN LISPRO 7 UNITS: 100 INJECTION, SOLUTION INTRAVENOUS; SUBCUTANEOUS at 00:18

## 2020-01-01 RX ADMIN — Medication 10 ML: at 22:30

## 2020-01-01 RX ADMIN — Medication 10 ML: at 16:58

## 2020-01-01 RX ADMIN — MIDAZOLAM HYDROCHLORIDE 1 MG: 5 INJECTION INTRAMUSCULAR; INTRAVENOUS at 05:14

## 2020-01-01 RX ADMIN — AMLODIPINE BESYLATE 5 MG: 5 TABLET ORAL at 09:21

## 2020-01-01 RX ADMIN — NOREPINEPHRINE BITARTRATE 200 MCG/MIN: 1 INJECTION INTRAVENOUS at 05:12

## 2020-01-01 RX ADMIN — DEXAMETHASONE 10 MG: 4 TABLET ORAL at 09:17

## 2020-01-01 RX ADMIN — HEPARIN SODIUM 3 UNITS/KG/HR: 10000 INJECTION, SOLUTION INTRAVENOUS at 01:51

## 2020-01-01 RX ADMIN — NALOXONE HYDROCHLORIDE 0.4 MG: 0.4 INJECTION, SOLUTION INTRAMUSCULAR; INTRAVENOUS; SUBCUTANEOUS at 19:25

## 2020-01-01 RX ADMIN — SODIUM BICARBONATE: 84 INJECTION, SOLUTION INTRAVENOUS at 00:59

## 2020-01-01 RX ADMIN — ATORVASTATIN CALCIUM 40 MG: 20 TABLET, FILM COATED ORAL at 08:50

## 2020-01-01 RX ADMIN — PROPOFOL 30 MCG/KG/MIN: 10 INJECTION, EMULSION INTRAVENOUS at 23:11

## 2020-01-01 RX ADMIN — PHENYLEPHRINE HYDROCHLORIDE 290 MCG/MIN: 10 INJECTION INTRAVENOUS at 19:23

## 2020-01-01 RX ADMIN — OXYCODONE HYDROCHLORIDE AND ACETAMINOPHEN 500 MG: 500 TABLET ORAL at 08:31

## 2020-01-01 RX ADMIN — Medication 10 ML: at 14:02

## 2020-01-01 RX ADMIN — Medication 10 ML: at 06:27

## 2020-01-01 RX ADMIN — NOREPINEPHRINE BITARTRATE 200 MCG/MIN: 1 INJECTION INTRAVENOUS at 17:15

## 2020-01-01 RX ADMIN — DEXAMETHASONE 10 MG: 4 TABLET ORAL at 09:41

## 2020-01-01 RX ADMIN — Medication 10 ML: at 13:42

## 2020-01-01 RX ADMIN — HEPARIN SODIUM 5000 UNITS: 5000 INJECTION INTRAVENOUS; SUBCUTANEOUS at 06:56

## 2020-01-01 RX ADMIN — INSULIN LISPRO 2 UNITS: 100 INJECTION, SOLUTION INTRAVENOUS; SUBCUTANEOUS at 12:54

## 2020-01-01 RX ADMIN — PANTOPRAZOLE SODIUM 40 MG: 40 INJECTION, POWDER, FOR SOLUTION INTRAVENOUS at 20:19

## 2020-01-01 RX ADMIN — Medication 10 ML: at 23:25

## 2020-01-01 RX ADMIN — SENNOSIDES AND DOCUSATE SODIUM 1 TABLET: 8.6; 5 TABLET ORAL at 09:27

## 2020-01-01 RX ADMIN — ASPIRIN 81 MG 324 MG: 81 TABLET ORAL at 09:21

## 2020-01-01 RX ADMIN — ASPIRIN 81 MG 324 MG: 81 TABLET ORAL at 11:39

## 2020-01-01 RX ADMIN — Medication 200 MCG/HR: at 04:12

## 2020-01-01 RX ADMIN — METRONIDAZOLE 500 MG: 500 INJECTION, SOLUTION INTRAVENOUS at 00:24

## 2020-01-01 RX ADMIN — OXYCODONE HYDROCHLORIDE AND ACETAMINOPHEN 500 MG: 500 TABLET ORAL at 08:32

## 2020-01-01 RX ADMIN — FAMOTIDINE 20 MG: 20 TABLET, FILM COATED ORAL at 19:00

## 2020-01-01 RX ADMIN — Medication 10 ML: at 14:44

## 2020-01-01 RX ADMIN — GUAIFENESIN 1200 MG: 600 TABLET ORAL at 05:29

## 2020-01-01 RX ADMIN — ATORVASTATIN CALCIUM 40 MG: 20 TABLET, FILM COATED ORAL at 10:25

## 2020-01-01 RX ADMIN — Medication 10 ML: at 13:45

## 2020-01-01 RX ADMIN — MIDAZOLAM HYDROCHLORIDE 1 MG: 5 INJECTION INTRAMUSCULAR; INTRAVENOUS at 22:41

## 2020-01-01 RX ADMIN — HEPARIN SODIUM 5000 UNITS: 5000 INJECTION INTRAVENOUS; SUBCUTANEOUS at 21:01

## 2020-01-01 RX ADMIN — NOREPINEPHRINE BITARTRATE 175 MCG/MIN: 1 INJECTION INTRAVENOUS at 06:49

## 2020-01-01 RX ADMIN — SODIUM BICARBONATE: 84 INJECTION, SOLUTION INTRAVENOUS at 08:12

## 2020-01-01 RX ADMIN — Medication 1 CAPSULE: at 08:17

## 2020-01-01 RX ADMIN — PANTOPRAZOLE SODIUM 40 MG: 40 INJECTION, POWDER, FOR SOLUTION INTRAVENOUS at 09:18

## 2020-01-01 RX ADMIN — OXYCODONE HYDROCHLORIDE AND ACETAMINOPHEN 500 MG: 500 TABLET ORAL at 08:50

## 2020-01-01 RX ADMIN — ASPIRIN 81 MG 324 MG: 81 TABLET ORAL at 09:38

## 2020-01-01 RX ADMIN — Medication 1 CAPSULE: at 08:47

## 2020-01-01 RX ADMIN — DEXTROSE 50 MCG/MIN: 5 SOLUTION INTRAVENOUS at 20:01

## 2020-01-01 RX ADMIN — DEXTROSE 15 MCG/MIN: 5 SOLUTION INTRAVENOUS at 13:51

## 2020-01-01 RX ADMIN — POLYETHYLENE GLYCOL 3350 17 G: 17 POWDER, FOR SOLUTION ORAL at 17:31

## 2020-01-01 RX ADMIN — PHENYLEPHRINE HYDROCHLORIDE 300 MCG/MIN: 10 INJECTION INTRAVENOUS at 13:51

## 2020-01-01 RX ADMIN — INSULIN HUMAN 10 UNITS: 100 INJECTION, SUSPENSION SUBCUTANEOUS at 08:28

## 2020-01-01 RX ADMIN — DEXTROSE 20 MCG/MIN: 5 SOLUTION INTRAVENOUS at 02:54

## 2020-01-01 RX ADMIN — Medication 10 ML: at 12:18

## 2020-01-01 RX ADMIN — ACETAMINOPHEN 650 MG: 325 TABLET ORAL at 09:44

## 2020-01-01 RX ADMIN — REMDESIVIR 200 MG: 5 INJECTION INTRAVENOUS at 12:34

## 2020-01-01 RX ADMIN — ENOXAPARIN SODIUM 40 MG: 40 INJECTION SUBCUTANEOUS at 18:06

## 2020-01-01 RX ADMIN — PROPOFOL 10 MCG/KG/MIN: 10 INJECTION, EMULSION INTRAVENOUS at 01:51

## 2020-01-01 RX ADMIN — HEPARIN SODIUM 5000 UNITS: 5000 INJECTION INTRAVENOUS; SUBCUTANEOUS at 22:09

## 2020-01-01 RX ADMIN — OXYCODONE HYDROCHLORIDE AND ACETAMINOPHEN 500 MG: 500 TABLET ORAL at 09:38

## 2020-01-01 RX ADMIN — FAMOTIDINE 20 MG: 20 TABLET, FILM COATED ORAL at 18:03

## 2020-01-01 RX ADMIN — OXYCODONE HYDROCHLORIDE AND ACETAMINOPHEN 500 MG: 500 TABLET ORAL at 18:09

## 2020-01-01 RX ADMIN — Medication 75 MCG/HR: at 17:16

## 2020-01-01 RX ADMIN — HEPARIN SODIUM 5000 UNITS: 5000 INJECTION INTRAVENOUS; SUBCUTANEOUS at 22:12

## 2020-01-01 RX ADMIN — Medication 10 ML: at 06:22

## 2020-01-01 RX ADMIN — NOREPINEPHRINE BITARTRATE 175 MCG/MIN: 1 INJECTION INTRAVENOUS at 20:20

## 2020-01-01 RX ADMIN — VASOPRESSIN 0.04 UNITS/MIN: 20 INJECTION INTRAVENOUS at 02:21

## 2020-01-01 RX ADMIN — Medication 1 CAPSULE: at 09:17

## 2020-01-01 RX ADMIN — FAMOTIDINE 20 MG: 20 TABLET, FILM COATED ORAL at 20:45

## 2020-01-01 RX ADMIN — NOREPINEPHRINE BITARTRATE 160 MCG/MIN: 1 INJECTION INTRAVENOUS at 20:26

## 2020-01-01 RX ADMIN — DEXAMETHASONE 10 MG: 4 TABLET ORAL at 08:28

## 2020-01-01 RX ADMIN — SODIUM BICARBONATE: 84 INJECTION, SOLUTION INTRAVENOUS at 10:40

## 2020-01-01 RX ADMIN — INSULIN LISPRO 3 UNITS: 100 INJECTION, SOLUTION INTRAVENOUS; SUBCUTANEOUS at 17:10

## 2020-01-01 RX ADMIN — Medication 10 ML: at 05:12

## 2020-01-01 RX ADMIN — METRONIDAZOLE 500 MG: 500 INJECTION, SOLUTION INTRAVENOUS at 12:14

## 2020-01-01 RX ADMIN — OXYCODONE HYDROCHLORIDE AND ACETAMINOPHEN 500 MG: 500 TABLET ORAL at 19:00

## 2020-01-01 RX ADMIN — VASOPRESSIN 0.04 UNITS/MIN: 20 INJECTION INTRAVENOUS at 19:00

## 2020-01-01 RX ADMIN — HEPARIN SODIUM 5000 UNITS: 5000 INJECTION INTRAVENOUS; SUBCUTANEOUS at 06:19

## 2020-01-01 RX ADMIN — PANTOPRAZOLE SODIUM 40 MG: 40 INJECTION, POWDER, FOR SOLUTION INTRAVENOUS at 21:12

## 2020-01-01 RX ADMIN — OXYCODONE HYDROCHLORIDE AND ACETAMINOPHEN 500 MG: 500 TABLET ORAL at 17:31

## 2020-01-01 RX ADMIN — POLYETHYLENE GLYCOL 3350 17 G: 17 POWDER, FOR SOLUTION ORAL at 09:41

## 2020-01-01 RX ADMIN — POLYETHYLENE GLYCOL 3350 17 G: 17 POWDER, FOR SOLUTION ORAL at 17:04

## 2020-01-01 RX ADMIN — GUAIFENESIN 1200 MG: 600 TABLET ORAL at 18:00

## 2020-01-01 RX ADMIN — ASPIRIN 81 MG 324 MG: 81 TABLET ORAL at 10:00

## 2020-01-01 RX ADMIN — AMLODIPINE BESYLATE 5 MG: 5 TABLET ORAL at 09:40

## 2020-01-01 RX ADMIN — PHENYLEPHRINE HYDROCHLORIDE 300 MCG/MIN: 10 INJECTION INTRAVENOUS at 06:15

## 2020-01-01 RX ADMIN — AZITHROMYCIN 500 MG: 500 INJECTION, POWDER, LYOPHILIZED, FOR SOLUTION INTRAVENOUS at 14:40

## 2020-01-01 RX ADMIN — NOREPINEPHRINE BITARTRATE 200 MCG/MIN: 1 INJECTION INTRAVENOUS at 05:11

## 2020-01-01 RX ADMIN — SODIUM PHOSPHATE, DIBASIC, ANHYDROUS, POTASSIUM PHOSPHATE, MONOBASIC, AND SODIUM PHOSPHATE, MONOBASIC, MONOHYDRATE 1 TABLET: 852; 155; 130 TABLET, COATED ORAL at 17:08

## 2020-01-01 RX ADMIN — ACETAMINOPHEN 650 MG: 325 TABLET ORAL at 18:45

## 2020-01-01 RX ADMIN — FUROSEMIDE 20 MG: 10 INJECTION, SOLUTION INTRAMUSCULAR; INTRAVENOUS at 10:50

## 2020-01-01 RX ADMIN — Medication 40 ML: at 21:13

## 2020-01-01 RX ADMIN — OXYCODONE HYDROCHLORIDE AND ACETAMINOPHEN 500 MG: 500 TABLET ORAL at 09:21

## 2020-01-01 RX ADMIN — HEPARIN SODIUM 3100 UNITS: 5000 INJECTION INTRAVENOUS; SUBCUTANEOUS at 06:53

## 2020-01-01 RX ADMIN — HEPARIN SODIUM 4.21 UNITS/KG/HR: 10000 INJECTION, SOLUTION INTRAVENOUS at 13:01

## 2020-01-01 RX ADMIN — INSULIN LISPRO 7 UNITS: 100 INJECTION, SOLUTION INTRAVENOUS; SUBCUTANEOUS at 17:31

## 2020-01-01 RX ADMIN — Medication 200 MCG/HR: at 12:19

## 2020-01-01 RX ADMIN — Medication 1 CAPSULE: at 10:25

## 2020-01-01 RX ADMIN — AMLODIPINE BESYLATE 5 MG: 5 TABLET ORAL at 08:44

## 2020-01-01 RX ADMIN — GUAIFENESIN 1200 MG: 600 TABLET ORAL at 18:08

## 2020-01-01 RX ADMIN — HEPARIN SODIUM 7 UNITS/KG/HR: 10000 INJECTION, SOLUTION INTRAVENOUS at 09:33

## 2020-01-01 RX ADMIN — REMDESIVIR 100 MG: 5 INJECTION INTRAVENOUS at 18:08

## 2020-01-01 RX ADMIN — PHENYLEPHRINE HYDROCHLORIDE 200 MCG/MIN: 10 INJECTION INTRAVENOUS at 22:41

## 2020-01-01 RX ADMIN — Medication 10 ML: at 05:59

## 2020-01-01 RX ADMIN — CEFEPIME HYDROCHLORIDE 1 G: 1 INJECTION, POWDER, FOR SOLUTION INTRAMUSCULAR; INTRAVENOUS at 23:18

## 2020-01-01 RX ADMIN — NOREPINEPHRINE BITARTRATE 170 MCG/MIN: 1 INJECTION INTRAVENOUS at 03:38

## 2020-01-01 RX ADMIN — NOREPINEPHRINE BITARTRATE 175 MCG/MIN: 1 INJECTION INTRAVENOUS at 15:13

## 2020-01-01 RX ADMIN — GUAIFENESIN 1200 MG: 600 TABLET ORAL at 17:08

## 2020-01-01 RX ADMIN — MINERAL OIL, PETROLATUM, PHENYLEPHRINE HCL: 14; 74.9; .25 OINTMENT RECTAL at 10:05

## 2020-01-01 RX ADMIN — Medication 10 ML: at 16:03

## 2020-01-01 RX ADMIN — Medication 10 ML: at 06:35

## 2020-01-01 RX ADMIN — ATORVASTATIN CALCIUM 40 MG: 20 TABLET, FILM COATED ORAL at 09:27

## 2020-01-01 RX ADMIN — SENNOSIDES AND DOCUSATE SODIUM 1 TABLET: 8.6; 5 TABLET ORAL at 08:32

## 2020-01-01 RX ADMIN — SODIUM BICARBONATE: 84 INJECTION, SOLUTION INTRAVENOUS at 09:30

## 2020-01-01 RX ADMIN — ATORVASTATIN CALCIUM 40 MG: 20 TABLET, FILM COATED ORAL at 08:17

## 2020-01-01 RX ADMIN — POLYETHYLENE GLYCOL 3350 17 G: 17 POWDER, FOR SOLUTION ORAL at 08:31

## 2020-01-01 RX ADMIN — AMLODIPINE BESYLATE 5 MG: 5 TABLET ORAL at 09:25

## 2020-01-01 RX ADMIN — Medication 10 ML: at 06:12

## 2020-01-01 RX ADMIN — Medication 1 CAPSULE: at 10:00

## 2020-01-01 RX ADMIN — HEPARIN SODIUM 5000 UNITS: 5000 INJECTION INTRAVENOUS; SUBCUTANEOUS at 14:02

## 2020-01-01 RX ADMIN — Medication 200 MCG/HR: at 10:39

## 2020-01-01 RX ADMIN — FAMOTIDINE 20 MG: 20 TABLET, FILM COATED ORAL at 18:33

## 2020-01-01 RX ADMIN — GUAIFENESIN 1200 MG: 600 TABLET ORAL at 17:17

## 2020-01-01 RX ADMIN — NOREPINEPHRINE BITARTRATE 200 MCG/MIN: 1 INJECTION INTRAVENOUS at 08:18

## 2020-01-01 RX ADMIN — SODIUM PHOSPHATE, DIBASIC, ANHYDROUS, POTASSIUM PHOSPHATE, MONOBASIC, AND SODIUM PHOSPHATE, MONOBASIC, MONOHYDRATE 1 TABLET: 852; 155; 130 TABLET, COATED ORAL at 10:50

## 2020-01-01 RX ADMIN — Medication 10 ML: at 14:01

## 2020-01-01 RX ADMIN — NOREPINEPHRINE BITARTRATE 160 MCG/MIN: 1 INJECTION INTRAVENOUS at 00:05

## 2020-01-01 RX ADMIN — SODIUM CHLORIDE 20.2 UNITS/HR: 900 INJECTION, SOLUTION INTRAVENOUS at 01:11

## 2020-01-01 RX ADMIN — ATORVASTATIN CALCIUM 40 MG: 20 TABLET, FILM COATED ORAL at 08:31

## 2020-01-01 RX ADMIN — SODIUM CHLORIDE 100 ML/HR: 900 INJECTION, SOLUTION INTRAVENOUS at 13:44

## 2020-01-01 RX ADMIN — SODIUM BICARBONATE: 84 INJECTION, SOLUTION INTRAVENOUS at 07:40

## 2020-01-01 RX ADMIN — CEFEPIME HYDROCHLORIDE 1 G: 1 INJECTION, POWDER, FOR SOLUTION INTRAMUSCULAR; INTRAVENOUS at 22:40

## 2020-01-01 RX ADMIN — Medication 200 MCG/HR: at 19:45

## 2020-01-01 RX ADMIN — DEXAMETHASONE 10 MG: 4 TABLET ORAL at 08:31

## 2020-01-01 RX ADMIN — PHENYLEPHRINE HYDROCHLORIDE 300 MCG/MIN: 10 INJECTION INTRAVENOUS at 08:07

## 2020-01-01 RX ADMIN — SODIUM PHOSPHATE, DIBASIC, ANHYDROUS, POTASSIUM PHOSPHATE, MONOBASIC, AND SODIUM PHOSPHATE, MONOBASIC, MONOHYDRATE 1 TABLET: 852; 155; 130 TABLET, COATED ORAL at 08:43

## 2020-01-01 RX ADMIN — SODIUM BICARBONATE: 84 INJECTION, SOLUTION INTRAVENOUS at 15:11

## 2020-01-01 RX ADMIN — Medication 10 ML: at 14:36

## 2020-01-01 RX ADMIN — GUAIFENESIN 1200 MG: 600 TABLET ORAL at 06:21

## 2020-01-01 RX ADMIN — VASOPRESSIN 0.04 UNITS/MIN: 20 INJECTION INTRAVENOUS at 17:22

## 2020-01-01 RX ADMIN — Medication 10 ML: at 22:12

## 2020-01-01 RX ADMIN — PHENYLEPHRINE HYDROCHLORIDE 300 MCG/MIN: 10 INJECTION INTRAVENOUS at 19:00

## 2020-01-01 RX ADMIN — ACETAMINOPHEN 650 MG: 325 TABLET ORAL at 21:16

## 2020-01-01 RX ADMIN — GUAIFENESIN 1200 MG: 600 TABLET ORAL at 18:09

## 2020-01-01 RX ADMIN — Medication 10 ML: at 06:50

## 2020-01-01 RX ADMIN — MIDAZOLAM HYDROCHLORIDE 1 MG: 5 INJECTION INTRAMUSCULAR; INTRAVENOUS at 20:46

## 2020-01-01 RX ADMIN — HEPARIN SODIUM 5000 UNITS: 5000 INJECTION INTRAVENOUS; SUBCUTANEOUS at 06:00

## 2020-01-01 RX ADMIN — PROPOFOL 30 MCG/KG/MIN: 10 INJECTION, EMULSION INTRAVENOUS at 07:06

## 2020-01-01 RX ADMIN — SODIUM CHLORIDE 100 ML/HR: 900 INJECTION, SOLUTION INTRAVENOUS at 18:00

## 2020-01-01 RX ADMIN — FAMOTIDINE 20 MG: 20 TABLET, FILM COATED ORAL at 18:08

## 2020-01-01 RX ADMIN — ASPIRIN 81 MG 324 MG: 81 TABLET ORAL at 09:24

## 2020-01-01 RX ADMIN — NOREPINEPHRINE BITARTRATE 200 MCG/MIN: 1 INJECTION INTRAVENOUS at 08:17

## 2020-01-01 RX ADMIN — GUAIFENESIN 1200 MG: 600 TABLET ORAL at 05:12

## 2020-01-01 RX ADMIN — AMLODIPINE BESYLATE 5 MG: 5 TABLET ORAL at 08:50

## 2020-01-01 RX ADMIN — DEXAMETHASONE 10 MG: 4 TABLET ORAL at 09:39

## 2020-01-01 RX ADMIN — HEPARIN SODIUM 18 UNITS/KG/HR: 10000 INJECTION, SOLUTION INTRAVENOUS at 14:25

## 2020-01-01 RX ADMIN — PHENYLEPHRINE HYDROCHLORIDE 300 MCG/MIN: 10 INJECTION INTRAVENOUS at 12:13

## 2020-01-01 RX ADMIN — GUAIFENESIN 1200 MG: 600 TABLET ORAL at 18:34

## 2020-01-01 RX ADMIN — SENNOSIDES AND DOCUSATE SODIUM 1 TABLET: 8.6; 5 TABLET ORAL at 09:17

## 2020-01-01 RX ADMIN — CALCIUM GLUCONATE 1000 MG: 20 INJECTION, SOLUTION INTRAVENOUS at 23:12

## 2020-01-01 RX ADMIN — Medication 10 ML: at 05:13

## 2020-01-01 RX ADMIN — SODIUM BICARBONATE: 84 INJECTION, SOLUTION INTRAVENOUS at 00:21

## 2020-01-01 RX ADMIN — ASPIRIN 81 MG 324 MG: 81 TABLET ORAL at 08:43

## 2020-01-01 RX ADMIN — NOREPINEPHRINE BITARTRATE 175 MCG/MIN: 1 INJECTION INTRAVENOUS at 02:04

## 2020-01-01 RX ADMIN — Medication 10 ML: at 21:19

## 2020-01-01 RX ADMIN — FAMOTIDINE 20 MG: 20 TABLET, FILM COATED ORAL at 17:21

## 2020-01-01 RX ADMIN — ENOXAPARIN SODIUM 40 MG: 40 INJECTION SUBCUTANEOUS at 18:03

## 2020-01-01 RX ADMIN — VANCOMYCIN HYDROCHLORIDE 1250 MG: 1.25 INJECTION, POWDER, LYOPHILIZED, FOR SOLUTION INTRAVENOUS at 23:40

## 2020-01-01 RX ADMIN — PHENYLEPHRINE HYDROCHLORIDE 300 MCG/MIN: 10 INJECTION INTRAVENOUS at 12:42

## 2020-01-01 RX ADMIN — HEPARIN SODIUM 5000 UNITS: 5000 INJECTION INTRAVENOUS; SUBCUTANEOUS at 22:16

## 2020-01-01 RX ADMIN — Medication 10 ML: at 22:08

## 2020-01-01 RX ADMIN — OXYCODONE HYDROCHLORIDE AND ACETAMINOPHEN 500 MG: 500 TABLET ORAL at 10:00

## 2020-01-01 RX ADMIN — POLYETHYLENE GLYCOL 3350 17 G: 17 POWDER, FOR SOLUTION ORAL at 08:28

## 2020-01-01 RX ADMIN — INSULIN LISPRO 7 UNITS: 100 INJECTION, SOLUTION INTRAVENOUS; SUBCUTANEOUS at 11:16

## 2020-01-01 RX ADMIN — Medication 10 ML: at 21:01

## 2020-01-01 RX ADMIN — SODIUM BICARBONATE: 84 INJECTION, SOLUTION INTRAVENOUS at 18:06

## 2020-01-01 RX ADMIN — PROPOFOL 25 MCG/KG/MIN: 10 INJECTION, EMULSION INTRAVENOUS at 20:17

## 2020-01-01 RX ADMIN — ASPIRIN 81 MG 324 MG: 81 TABLET ORAL at 09:40

## 2020-01-01 RX ADMIN — Medication 10 ML: at 05:29

## 2020-01-01 RX ADMIN — GUAIFENESIN 1200 MG: 600 TABLET ORAL at 06:19

## 2020-01-01 RX ADMIN — PHENYLEPHRINE HYDROCHLORIDE 300 MCG/MIN: 10 INJECTION INTRAVENOUS at 01:01

## 2020-01-01 RX ADMIN — PHENYLEPHRINE HYDROCHLORIDE 300 MCG/MIN: 10 INJECTION INTRAVENOUS at 08:02

## 2020-01-01 RX ADMIN — ATORVASTATIN CALCIUM 40 MG: 20 TABLET, FILM COATED ORAL at 08:44

## 2020-01-01 RX ADMIN — VASOPRESSIN 0.04 UNITS/MIN: 20 INJECTION INTRAVENOUS at 10:54

## 2020-01-01 RX ADMIN — DEXAMETHASONE 10 MG: 4 TABLET ORAL at 09:22

## 2020-01-01 RX ADMIN — INSULIN LISPRO 5 UNITS: 100 INJECTION, SOLUTION INTRAVENOUS; SUBCUTANEOUS at 11:55

## 2020-01-01 RX ADMIN — GUAIFENESIN 1200 MG: 600 TABLET ORAL at 08:32

## 2020-01-01 RX ADMIN — METRONIDAZOLE 500 MG: 500 INJECTION, SOLUTION INTRAVENOUS at 12:20

## 2020-01-01 RX ADMIN — Medication 10 ML: at 05:31

## 2020-01-01 RX ADMIN — MORPHINE SULFATE 2 MG: 2 INJECTION, SOLUTION INTRAMUSCULAR; INTRAVENOUS at 20:44

## 2020-01-01 RX ADMIN — CEFTRIAXONE SODIUM 1 G: 1 INJECTION, POWDER, FOR SOLUTION INTRAMUSCULAR; INTRAVENOUS at 12:30

## 2020-01-01 RX ADMIN — VASOPRESSIN 0.04 UNITS/MIN: 20 INJECTION INTRAVENOUS at 20:21

## 2020-01-01 RX ADMIN — FAMOTIDINE 20 MG: 20 TABLET, FILM COATED ORAL at 18:09

## 2020-01-01 RX ADMIN — Medication 1 CAPSULE: at 08:32

## 2020-01-01 RX ADMIN — OXYCODONE HYDROCHLORIDE AND ACETAMINOPHEN 500 MG: 500 TABLET ORAL at 17:04

## 2020-01-01 RX ADMIN — GUAIFENESIN 1200 MG: 600 TABLET ORAL at 19:00

## 2020-01-01 RX ADMIN — INSULIN HUMAN 10 UNITS: 100 INJECTION, SUSPENSION SUBCUTANEOUS at 18:41

## 2020-01-01 RX ADMIN — Medication 10 ML: at 15:27

## 2020-01-01 RX ADMIN — ASPIRIN 81 MG 324 MG: 81 TABLET ORAL at 08:17

## 2020-01-01 RX ADMIN — PANTOPRAZOLE SODIUM 40 MG: 40 INJECTION, POWDER, FOR SOLUTION INTRAVENOUS at 08:27

## 2020-01-01 RX ADMIN — Medication 10 ML: at 14:58

## 2020-01-01 RX ADMIN — SENNOSIDES AND DOCUSATE SODIUM 1 TABLET: 8.6; 5 TABLET ORAL at 09:21

## 2020-01-01 RX ADMIN — GUAIFENESIN 1200 MG: 600 TABLET ORAL at 05:30

## 2020-01-01 RX ADMIN — SODIUM BICARBONATE: 84 INJECTION, SOLUTION INTRAVENOUS at 03:40

## 2020-01-01 RX ADMIN — Medication 10 ML: at 21:39

## 2020-01-01 RX ADMIN — NOREPINEPHRINE BITARTRATE 180 MCG/MIN: 1 INJECTION INTRAVENOUS at 09:53

## 2020-01-01 RX ADMIN — PANTOPRAZOLE SODIUM 40 MG: 40 INJECTION, POWDER, FOR SOLUTION INTRAVENOUS at 04:11

## 2020-01-01 RX ADMIN — OXYCODONE HYDROCHLORIDE AND ACETAMINOPHEN 500 MG: 500 TABLET ORAL at 09:25

## 2020-01-01 RX ADMIN — METRONIDAZOLE 500 MG: 500 INJECTION, SOLUTION INTRAVENOUS at 11:34

## 2020-01-01 RX ADMIN — DEXTROSE MONOHYDRATE 25 G: 25 INJECTION, SOLUTION INTRAVENOUS at 17:16

## 2020-01-01 RX ADMIN — Medication 10 ML: at 05:22

## 2020-01-01 RX ADMIN — CEFEPIME HYDROCHLORIDE 1 G: 1 INJECTION, POWDER, FOR SOLUTION INTRAMUSCULAR; INTRAVENOUS at 23:55

## 2020-01-01 RX ADMIN — SODIUM POLYSTYRENE SULFONATE 30 G: 15 SUSPENSION ORAL; RECTAL at 12:26

## 2020-01-01 RX ADMIN — SODIUM CHLORIDE 100 ML/HR: 900 INJECTION, SOLUTION INTRAVENOUS at 04:15

## 2020-01-01 RX ADMIN — SENNOSIDES AND DOCUSATE SODIUM 1 TABLET: 8.6; 5 TABLET ORAL at 09:40

## 2020-01-01 RX ADMIN — POLYETHYLENE GLYCOL 3350 17 G: 17 POWDER, FOR SOLUTION ORAL at 09:00

## 2020-01-01 RX ADMIN — FAMOTIDINE 20 MG: 20 TABLET, FILM COATED ORAL at 18:22

## 2020-01-01 RX ADMIN — POLYETHYLENE GLYCOL 3350 17 G: 17 POWDER, FOR SOLUTION ORAL at 13:07

## 2020-01-01 RX ADMIN — PHENYLEPHRINE HYDROCHLORIDE 300 MCG/MIN: 10 INJECTION INTRAVENOUS at 11:40

## 2020-01-01 RX ADMIN — SODIUM BICARBONATE: 84 INJECTION, SOLUTION INTRAVENOUS at 17:44

## 2020-01-01 RX ADMIN — HEPARIN SODIUM 5000 UNITS: 5000 INJECTION INTRAVENOUS; SUBCUTANEOUS at 21:19

## 2020-01-01 RX ADMIN — ATORVASTATIN CALCIUM 40 MG: 20 TABLET, FILM COATED ORAL at 09:59

## 2020-01-01 RX ADMIN — GLYCOPYRROLATE 0.2 MG: 0.2 INJECTION, SOLUTION INTRAMUSCULAR; INTRAVENOUS at 15:49

## 2020-01-01 RX ADMIN — PANTOPRAZOLE SODIUM 40 MG: 40 INJECTION, POWDER, FOR SOLUTION INTRAVENOUS at 08:32

## 2020-01-01 RX ADMIN — ATORVASTATIN CALCIUM 40 MG: 20 TABLET, FILM COATED ORAL at 09:22

## 2020-01-01 RX ADMIN — GUAIFENESIN 1200 MG: 600 TABLET ORAL at 18:06

## 2020-01-01 RX ADMIN — CEFEPIME HYDROCHLORIDE 1 G: 1 INJECTION, POWDER, FOR SOLUTION INTRAMUSCULAR; INTRAVENOUS at 23:06

## 2020-01-01 RX ADMIN — ASPIRIN 81 MG 324 MG: 81 TABLET ORAL at 10:25

## 2020-01-01 RX ADMIN — REMDESIVIR 100 MG: 5 INJECTION INTRAVENOUS at 17:31

## 2020-01-01 RX ADMIN — Medication 200 MCG/HR: at 03:12

## 2020-01-01 RX ADMIN — GUAIFENESIN 1200 MG: 600 TABLET ORAL at 18:18

## 2020-01-01 RX ADMIN — ASPIRIN 81 MG 324 MG: 81 TABLET ORAL at 08:31

## 2020-01-01 RX ADMIN — DEXTROSE 0.5 MCG/MIN: 5 SOLUTION INTRAVENOUS at 20:27

## 2020-01-01 RX ADMIN — METRONIDAZOLE 500 MG: 500 INJECTION, SOLUTION INTRAVENOUS at 23:25

## 2020-01-01 RX ADMIN — OXYCODONE HYDROCHLORIDE AND ACETAMINOPHEN 500 MG: 500 TABLET ORAL at 17:08

## 2020-01-01 RX ADMIN — INSULIN LISPRO 7 UNITS: 100 INJECTION, SOLUTION INTRAVENOUS; SUBCUTANEOUS at 12:07

## 2020-01-01 RX ADMIN — ACETAMINOPHEN 650 MG: 325 TABLET ORAL at 20:44

## 2020-01-01 RX ADMIN — OXYCODONE HYDROCHLORIDE AND ACETAMINOPHEN 500 MG: 500 TABLET ORAL at 08:47

## 2020-01-01 RX ADMIN — OXYCODONE HYDROCHLORIDE AND ACETAMINOPHEN 500 MG: 500 TABLET ORAL at 19:01

## 2020-01-01 RX ADMIN — OXYCODONE HYDROCHLORIDE AND ACETAMINOPHEN 500 MG: 500 TABLET ORAL at 18:33

## 2020-01-01 RX ADMIN — METRONIDAZOLE 500 MG: 500 INJECTION, SOLUTION INTRAVENOUS at 11:28

## 2020-01-01 RX ADMIN — VASOPRESSIN 0.04 UNITS/MIN: 20 INJECTION INTRAVENOUS at 21:43

## 2020-01-01 RX ADMIN — PHENYLEPHRINE HYDROCHLORIDE 210 MCG/MIN: 10 INJECTION INTRAVENOUS at 07:28

## 2020-01-01 RX ADMIN — ACETAMINOPHEN 650 MG: 325 TABLET ORAL at 23:36

## 2020-01-01 RX ADMIN — Medication 10 ML: at 13:10

## 2020-01-01 RX ADMIN — DEXAMETHASONE 10 MG: 4 TABLET ORAL at 09:26

## 2020-01-01 RX ADMIN — Medication 10 ML: at 06:57

## 2020-01-01 RX ADMIN — VASOPRESSIN 0.04 UNITS/MIN: 20 INJECTION INTRAVENOUS at 04:44

## 2020-01-01 RX ADMIN — POLYETHYLENE GLYCOL 3350 17 G: 17 POWDER, FOR SOLUTION ORAL at 18:41

## 2020-01-01 RX ADMIN — SODIUM CHLORIDE 1000 ML: 900 INJECTION, SOLUTION INTRAVENOUS at 22:00

## 2020-01-01 RX ADMIN — Medication 1 CAPSULE: at 09:38

## 2020-01-01 RX ADMIN — SODIUM BICARBONATE 100 MEQ: 84 INJECTION INTRAVENOUS at 22:02

## 2020-01-01 RX ADMIN — INSULIN LISPRO 2 UNITS: 100 INJECTION, SOLUTION INTRAVENOUS; SUBCUTANEOUS at 18:41

## 2020-01-01 RX ADMIN — ATORVASTATIN CALCIUM 40 MG: 20 TABLET, FILM COATED ORAL at 09:38

## 2020-01-01 RX ADMIN — PHENYLEPHRINE HYDROCHLORIDE 300 MCG/MIN: 10 INJECTION INTRAVENOUS at 19:56

## 2020-01-01 RX ADMIN — OXYCODONE HYDROCHLORIDE AND ACETAMINOPHEN 500 MG: 500 TABLET ORAL at 17:17

## 2020-01-01 RX ADMIN — OXYCODONE HYDROCHLORIDE AND ACETAMINOPHEN 500 MG: 500 TABLET ORAL at 09:27

## 2020-01-01 RX ADMIN — Medication 10 ML: at 20:46

## 2020-01-01 RX ADMIN — FAMOTIDINE 20 MG: 10 INJECTION INTRAVENOUS at 22:03

## 2020-01-01 RX ADMIN — NOREPINEPHRINE BITARTRATE 200 MCG/MIN: 1 INJECTION INTRAVENOUS at 11:06

## 2020-01-01 RX ADMIN — INSULIN LISPRO 3 UNITS: 100 INJECTION, SOLUTION INTRAVENOUS; SUBCUTANEOUS at 23:25

## 2020-01-01 RX ADMIN — AMLODIPINE BESYLATE 5 MG: 5 TABLET ORAL at 09:38

## 2020-01-01 RX ADMIN — METRONIDAZOLE 500 MG: 500 INJECTION, SOLUTION INTRAVENOUS at 12:38

## 2020-01-01 RX ADMIN — GUAIFENESIN 1200 MG: 600 TABLET ORAL at 20:45

## 2020-01-01 RX ADMIN — INSULIN LISPRO 3 UNITS: 100 INJECTION, SOLUTION INTRAVENOUS; SUBCUTANEOUS at 07:49

## 2020-01-01 RX ADMIN — GUAIFENESIN 1200 MG: 600 TABLET ORAL at 06:17

## 2020-01-01 RX ADMIN — FAMOTIDINE 20 MG: 20 TABLET, FILM COATED ORAL at 18:06

## 2020-01-01 RX ADMIN — ACETAMINOPHEN 650 MG: 325 TABLET ORAL at 18:15

## 2020-01-01 RX ADMIN — OXYCODONE HYDROCHLORIDE AND ACETAMINOPHEN 500 MG: 500 TABLET ORAL at 08:54

## 2020-01-01 RX ADMIN — FAMOTIDINE 20 MG: 20 TABLET, FILM COATED ORAL at 17:17

## 2020-01-01 RX ADMIN — PHENYLEPHRINE HYDROCHLORIDE 270 MCG/MIN: 10 INJECTION INTRAVENOUS at 01:21

## 2020-01-01 RX ADMIN — Medication 10 ML: at 22:16

## 2020-01-01 RX ADMIN — VASOPRESSIN 0.04 UNITS/MIN: 20 INJECTION INTRAVENOUS at 10:36

## 2020-01-01 RX ADMIN — HEPARIN SODIUM 5000 UNITS: 5000 INJECTION INTRAVENOUS; SUBCUTANEOUS at 06:49

## 2020-01-01 RX ADMIN — REMDESIVIR 100 MG: 5 INJECTION INTRAVENOUS at 18:14

## 2020-01-01 RX ADMIN — ATORVASTATIN CALCIUM 40 MG: 20 TABLET, FILM COATED ORAL at 09:40

## 2020-01-01 RX ADMIN — Medication 10 ML: at 06:56

## 2020-01-01 RX ADMIN — CEFTRIAXONE SODIUM 1 G: 1 INJECTION, POWDER, FOR SOLUTION INTRAMUSCULAR; INTRAVENOUS at 12:40

## 2020-01-01 RX ADMIN — PHENYLEPHRINE HYDROCHLORIDE 30 MCG/MIN: 10 INJECTION INTRAVENOUS at 20:00

## 2020-01-01 RX ADMIN — NOREPINEPHRINE BITARTRATE 200 MCG/MIN: 1 INJECTION INTRAVENOUS at 14:19

## 2020-01-01 RX ADMIN — CEFEPIME HYDROCHLORIDE 1 G: 1 INJECTION, POWDER, FOR SOLUTION INTRAMUSCULAR; INTRAVENOUS at 22:20

## 2020-01-01 RX ADMIN — INSULIN LISPRO 7 UNITS: 100 INJECTION, SOLUTION INTRAVENOUS; SUBCUTANEOUS at 05:53

## 2020-01-01 RX ADMIN — Medication 1 CAPSULE: at 09:26

## 2020-01-01 RX ADMIN — ATORVASTATIN CALCIUM 40 MG: 20 TABLET, FILM COATED ORAL at 08:54

## 2020-01-01 RX ADMIN — SODIUM BICARBONATE: 84 INJECTION, SOLUTION INTRAVENOUS at 18:59

## 2020-01-01 RX ADMIN — PHENYLEPHRINE HYDROCHLORIDE 245 MCG/MIN: 10 INJECTION INTRAVENOUS at 14:08

## 2020-01-01 RX ADMIN — OXYCODONE HYDROCHLORIDE AND ACETAMINOPHEN 500 MG: 500 TABLET ORAL at 09:17

## 2020-01-01 RX ADMIN — INSULIN LISPRO 22 UNITS: 100 INJECTION, SOLUTION INTRAVENOUS; SUBCUTANEOUS at 17:20

## 2020-01-01 RX ADMIN — Medication 200 MCG/HR: at 20:16

## 2020-01-01 RX ADMIN — METRONIDAZOLE 500 MG: 500 INJECTION, SOLUTION INTRAVENOUS at 23:36

## 2020-01-01 RX ADMIN — NOREPINEPHRINE BITARTRATE 200 MCG/MIN: 1 INJECTION INTRAVENOUS at 23:04

## 2020-01-01 RX ADMIN — OXYCODONE HYDROCHLORIDE AND ACETAMINOPHEN 500 MG: 500 TABLET ORAL at 13:45

## 2020-01-01 RX ADMIN — INSULIN LISPRO 2 UNITS: 100 INJECTION, SOLUTION INTRAVENOUS; SUBCUTANEOUS at 12:43

## 2020-01-01 RX ADMIN — VASOPRESSIN 0.05 UNITS/MIN: 20 INJECTION INTRAVENOUS at 19:25

## 2020-01-01 RX ADMIN — AZITHROMYCIN 500 MG: 500 INJECTION, POWDER, LYOPHILIZED, FOR SOLUTION INTRAVENOUS at 14:35

## 2020-01-01 RX ADMIN — ATORVASTATIN CALCIUM 40 MG: 20 TABLET, FILM COATED ORAL at 08:47

## 2020-01-01 RX ADMIN — Medication 10 ML: at 14:50

## 2020-01-01 RX ADMIN — ASPIRIN 81 MG 324 MG: 81 TABLET ORAL at 08:47

## 2020-01-01 RX ADMIN — Medication 1 CAPSULE: at 09:25

## 2020-01-01 RX ADMIN — Medication 100 MCG/HR: at 11:06

## 2020-01-01 RX ADMIN — MINERAL OIL, PETROLATUM, PHENYLEPHRINE HCL: 14; 74.9; .25 OINTMENT RECTAL at 13:41

## 2020-01-01 RX ADMIN — INSULIN LISPRO 7 UNITS: 100 INJECTION, SOLUTION INTRAVENOUS; SUBCUTANEOUS at 06:46

## 2020-01-01 RX ADMIN — INSULIN HUMAN 12 UNITS: 100 INJECTION, SUSPENSION SUBCUTANEOUS at 07:49

## 2020-01-01 RX ADMIN — OXYCODONE HYDROCHLORIDE AND ACETAMINOPHEN 500 MG: 500 TABLET ORAL at 18:18

## 2020-01-01 RX ADMIN — Medication 10 ML: at 22:41

## 2020-01-01 RX ADMIN — VANCOMYCIN HYDROCHLORIDE 750 MG: 750 INJECTION, POWDER, LYOPHILIZED, FOR SOLUTION INTRAVENOUS at 07:01

## 2020-01-01 RX ADMIN — PHENYLEPHRINE HYDROCHLORIDE 300 MCG/MIN: 10 INJECTION INTRAVENOUS at 06:17

## 2020-01-01 RX ADMIN — CALCIUM GLUCONATE 2 G: 20 INJECTION, SOLUTION INTRAVENOUS at 09:14

## 2020-01-01 RX ADMIN — SODIUM CHLORIDE 100 ML/HR: 900 INJECTION, SOLUTION INTRAVENOUS at 03:05

## 2020-01-01 RX ADMIN — GUAIFENESIN 1200 MG: 600 TABLET ORAL at 17:31

## 2020-01-01 RX ADMIN — ACETAMINOPHEN 650 MG: 325 TABLET ORAL at 20:45

## 2020-01-01 RX ADMIN — Medication 10 ML: at 06:20

## 2020-01-01 RX ADMIN — OXYCODONE HYDROCHLORIDE AND ACETAMINOPHEN 500 MG: 500 TABLET ORAL at 09:40

## 2020-01-01 RX ADMIN — INSULIN LISPRO 3 UNITS: 100 INJECTION, SOLUTION INTRAVENOUS; SUBCUTANEOUS at 02:18

## 2020-01-01 RX ADMIN — NOREPINEPHRINE BITARTRATE 175 MCG/MIN: 1 INJECTION INTRAVENOUS at 05:46

## 2020-01-01 RX ADMIN — SODIUM BICARBONATE: 84 INJECTION, SOLUTION INTRAVENOUS at 20:45

## 2020-01-01 RX ADMIN — METRONIDAZOLE 500 MG: 500 INJECTION, SOLUTION INTRAVENOUS at 23:55

## 2020-01-01 RX ADMIN — HYDROMORPHONE HYDROCHLORIDE 0.5 MG: 1 INJECTION, SOLUTION INTRAMUSCULAR; INTRAVENOUS; SUBCUTANEOUS at 18:14

## 2020-01-01 RX ADMIN — Medication 1 CAPSULE: at 09:40

## 2020-01-01 RX ADMIN — FAMOTIDINE 20 MG: 20 TABLET, FILM COATED ORAL at 18:18

## 2020-01-01 RX ADMIN — SODIUM CHLORIDE 10 MG: 900 INJECTION, SOLUTION INTRAVENOUS at 03:11

## 2020-01-01 RX ADMIN — Medication 10 ML: at 05:42

## 2020-01-01 RX ADMIN — Medication 20 ML: at 22:00

## 2020-01-01 RX ADMIN — OXYCODONE HYDROCHLORIDE AND ACETAMINOPHEN 500 MG: 500 TABLET ORAL at 18:06

## 2020-01-01 RX ADMIN — OXYCODONE HYDROCHLORIDE AND ACETAMINOPHEN 500 MG: 500 TABLET ORAL at 08:43

## 2020-01-01 RX ADMIN — PANTOPRAZOLE SODIUM 40 MG: 40 INJECTION, POWDER, FOR SOLUTION INTRAVENOUS at 09:17

## 2020-01-01 RX ADMIN — PHENYLEPHRINE HYDROCHLORIDE 300 MCG/MIN: 10 INJECTION INTRAVENOUS at 02:04

## 2020-01-01 RX ADMIN — Medication 10 ML: at 08:53

## 2020-01-01 RX ADMIN — HEPARIN SODIUM 5000 UNITS: 5000 INJECTION INTRAVENOUS; SUBCUTANEOUS at 06:26

## 2020-01-01 RX ADMIN — MIDAZOLAM HYDROCHLORIDE 1 MG: 5 INJECTION INTRAMUSCULAR; INTRAVENOUS at 02:45

## 2020-01-01 RX ADMIN — PANTOPRAZOLE SODIUM 40 MG: 40 INJECTION, POWDER, FOR SOLUTION INTRAVENOUS at 20:16

## 2020-01-01 RX ADMIN — INSULIN HUMAN 30 UNITS: 100 INJECTION, SUSPENSION SUBCUTANEOUS at 18:14

## 2020-01-01 RX ADMIN — FAMOTIDINE 20 MG: 20 TABLET, FILM COATED ORAL at 17:08

## 2020-01-01 RX ADMIN — HEPARIN SODIUM 3.21 UNITS/KG/HR: 10000 INJECTION, SOLUTION INTRAVENOUS at 19:32

## 2020-01-01 RX ADMIN — OXYCODONE HYDROCHLORIDE AND ACETAMINOPHEN 500 MG: 500 TABLET ORAL at 08:17

## 2020-01-01 RX ADMIN — HEPARIN SODIUM 5000 UNITS: 5000 INJECTION INTRAVENOUS; SUBCUTANEOUS at 13:27

## 2020-01-01 RX ADMIN — ASPIRIN 81 MG 324 MG: 81 TABLET ORAL at 08:54

## 2020-06-05 PROBLEM — J12.82 PNEUMONIA DUE TO COVID-19 VIRUS: Status: ACTIVE | Noted: 2020-01-01

## 2020-06-05 PROBLEM — U07.1 PNEUMONIA DUE TO COVID-19 VIRUS: Status: ACTIVE | Noted: 2020-01-01

## 2020-06-05 PROBLEM — R06.02 SOB (SHORTNESS OF BREATH): Status: ACTIVE | Noted: 2020-01-01

## 2020-06-05 PROBLEM — I10 HTN (HYPERTENSION), BENIGN: Status: ACTIVE | Noted: 2020-01-01

## 2020-06-05 PROBLEM — N17.9 AKI (ACUTE KIDNEY INJURY) (HCC): Status: ACTIVE | Noted: 2020-01-01

## 2020-06-05 NOTE — PROGRESS NOTES
[facility] Dosing Services: 6/5/20 Pharmacist Renal Dosing Progress Note for Dr Nat Leyva The following medication: famotidine was automatically dose-adjusted per [facility] P&T Committee Protocol, with respect to renal function. Pt Weight:  
Wt Readings from Last 1 Encounters:  
No data found for [employer] Previous Regimen  20mg po bid Serum Creatinine Lab Results Component Value Date/Time Creatinine 3.39 (H) 06/05/2020 09:45 AM  
   
Creatinine Clearance CrCl cannot be calculated (Unknown ideal weight.). BUN Lab Results Component Value Date/Time BUN 53 (H) 06/05/2020 09:45 AM  
   
Dosage changed to:  20mg po qpm 
 
 
 
Pharmacy to continue to monitor patient daily. Will make dosage adjustments based upon changing renal function. Denice Izquierdo, PHARMD. Contact information:  [phone]

## 2020-06-05 NOTE — PROGRESS NOTES
Provided  services remotely during assessment. Chance OlsenSophia V[place] [address] 
[phone]

## 2020-06-05 NOTE — PROGRESS NOTES
Bedside and Verbal shift change report given to ASHA Negrete (oncoming nurse) by Ranulfo Elam RN (offgoing nurse). Report included the following information SBAR, Kardex, ED Summary, Procedure Summary, Intake/Output, MAR and Recent Results.

## 2020-06-05 NOTE — PROGRESS NOTES
Admission Medication Reconciliation: 
  
Pharmacist called the patient's room with the [facility] interpretor service. The patient did not answer. Pharmacist will call back. Thank you, Arleth Kellogg, PharmD, BCPS

## 2020-06-05 NOTE — PROGRESS NOTES
Reason for Admission:   PNA due to COVID-19 virus RUR Score:    7% Plan for utilizing home health:     No  
 
PCP: First and Last name:  Pt does not have a PCP. Name of Practice:  
 Are you a current patient: Yes/No:  
 Approximate date of last visit:  
 Can you participate in a virtual visit with your PCP:  
                 
Current Advanced Directive/Advance Care Plan:  See ACP Activator note Transition of Care Plan:           
Initial assessment done by phone with , Dirk Acuna. Pt reportedly resides in a one story house with her daughter, Chelo Lozano (N:[phone]), son-in-law, Richard Hernadez, and two grandchildren (ages 15 and 9). PTA pt was independent with ADLs. Her preferred pharmacy is [facility]. Pt does not have a PCP. CM will provide [facility] schedule and information on [facility] to pt's nurse to give to pt. 1. CM following 2. Home when medically stable. 3. Pt's family will transport. Care Management Interventions Current Support Network: Relative's Home Confirm Follow Up Transport: Family Discharge Location Discharge Placement: Home Vi Hernandez LCSW

## 2020-06-05 NOTE — CONSULTS
[facility] Initial Patient Consult Name: Mike Feliciano MRN: [id number] : 1945 Hospital: [address] [facility] Date: 2020 IMPRESSION:  
· COVID-19+ · Elevated troponin · LESLEY, ? CKD · HTN  
  
RECOMMENDATIONS:  
· Supplemental O2 as needed to keep sats > 88% · Continue ceftriaxone and azithro · Send sputum culture if able. Check mycoplasma · Trend inflammatory markers. Currently does not meet criteria for more advanced therapies · Check BNP, CK 
· IL-6 pending · Check HIV, hepatitis panel, quant gold · Continue vitamin C, zinc, lipitor · Echo · IS Thank you for the consult Will see as needed over the weekend Subjective:  
 
Ms. Joelle Soto is a 75yo female w/ history of HTN who presented to the ER on  w/ complaints of shortness of breath, weakness and chest pain. Sats 93% on RA. Has family members that are COVID+. Home med list includes 10mg prednisone that she has apparently been taking since April for \"pain. \" 
 
 
Past Medical History:  
Diagnosis Date  
 HTN (hypertension), benign No past surgical history on file. Prior to Admission medications Medication Sig Start Date End Date Taking? Authorizing Provider  
lisinopriL (PRINIVIL, ZESTRIL) 5 mg tablet Take 5 mg by mouth daily. Yes Provider, Historical  
predniSONE (DELTASONE) 5 mg tablet Take 10 mg by mouth daily. 20 Yes Provider, Historical  
 
No Known Allergies Social History Tobacco Use  Smoking status: Never Smoker  Smokeless tobacco: Never Used Substance Use Topics  Alcohol use: Never Frequency: Never Family History Problem Relation Age of Onset  Diabetes Neg Hx Current Facility-Administered Medications Medication Dose Route Frequency  aspirin chewable tablet 324 mg  324 mg Oral DAILY  sodium chloride (NS) flush 5-40 mL  5-40 mL IntraVENous Q8H  
  enoxaparin (LOVENOX) injection 40 mg  40 mg SubCUTAneous Q24H  
 ascorbic acid (vitamin C) (VITAMIN C) tablet 500 mg  500 mg Oral BID  [START ON 2020] zinc sulfate (ZINCATE) 220 (50) mg capsule 1 Cap  1 Cap Oral DAILY  cefTRIAXone (ROCEPHIN) 1 g in 0.9% sodium chloride (MBP/ADV) 50 mL  1 g IntraVENous Q24H  
 0.9% sodium chloride infusion  100 mL/hr IntraVENous CONTINUOUS  
 azithromycin (ZITHROMAX) 500 mg in 0.9% sodium chloride (MBP/ADV) 250 mL  500 mg IntraVENous Q24H  
 [START ON 2020] atorvastatin (LIPITOR) tablet 40 mg  40 mg Oral DAILY  guaiFENesin ER (MUCINEX) tablet 1,200 mg  1,200 mg Oral BID  famotidine (PEPCID) tablet 20 mg  20 mg Oral BID Review of Systems: A comprehensive review of systems was negative except for that written in the HPI. Objective:  
Vital Signs:   
Visit Vitals /65 (BP 1 Location: Left arm, BP Patient Position: At rest) Pulse 93 Temp 98.4 °F (36.9 °C) Resp 20 SpO2 95% O2 Device: Nasal cannula O2 Flow Rate (L/min): 2 l/min Temp (24hrs), Av.2 °F (37.3 °C), Min:98.4 °F (36.9 °C), Max:100.4 °F (38 °C) Intake/Output:  
Last shift:      No intake/output data recorded. Last 3 shifts: No intake/output data recorded. No intake or output data in the 24 hours ending 20 1809 Physical Exam:  
General:  Alert, cooperative, no distress, appears stated age. Head:  NCAT Eyes:  EOMI Nose: Throat: MMM Neck:   
Back:     
Lungs:     
Chest wall:    
Heart:  RRR Abdomen:     
Extremities:   
Pulses:   
Skin:   
Lymph nodes:   
Neurologic:  
 
Exam limited to avoid unnecessary exposure and to preserve PPE during COVID19 pandemic. Data review:  
 
Recent Results (from the past 24 hour(s)) SAMPLES BEING HELD Collection Time: 20  9:45 AM  
Result Value Ref Range SAMPLES BEING HELD 2BC (PURPLE, BLUE) COMMENT Add-on orders for these samples will be processed based on acceptable specimen integrity and analyte stability, which may vary by analyte. PROCALCITONIN Collection Time: 06/05/20  9:45 AM  
Result Value Ref Range Procalcitonin 0.12 ng/mL CBC WITH AUTOMATED DIFF Collection Time: 06/05/20  9:45 AM  
Result Value Ref Range WBC 11.0 3.6 - 11.0 K/uL  
 RBC 4.54 3.80 - 5.20 M/uL  
 HGB 13.5 11.5 - 16.0 g/dL HCT 42.6 35.0 - 47.0 % MCV 93.8 80.0 - 99.0 FL  
 MCH 29.7 26.0 - 34.0 PG  
 MCHC 31.7 30.0 - 36.5 g/dL  
 RDW 13.6 11.5 - 14.5 % PLATELET 980 005 - 067 K/uL MPV 10.5 8.9 - 12.9 FL  
 NRBC 0.0 0  WBC ABSOLUTE NRBC 0.00 0.00 - 0.01 K/uL NEUTROPHILS 84 (H) 32 - 75 % LYMPHOCYTES 8 (L) 12 - 49 % MONOCYTES 8 5 - 13 % EOSINOPHILS 0 0 - 7 % BASOPHILS 0 0 - 1 % IMMATURE GRANULOCYTES 0 0.0 - 0.5 % ABS. NEUTROPHILS 9.2 (H) 1.8 - 8.0 K/UL  
 ABS. LYMPHOCYTES 0.9 0.8 - 3.5 K/UL  
 ABS. MONOCYTES 0.8 0.0 - 1.0 K/UL  
 ABS. EOSINOPHILS 0.0 0.0 - 0.4 K/UL  
 ABS. BASOPHILS 0.0 0.0 - 0.1 K/UL  
 ABS. IMM. GRANS. 0.0 0.00 - 0.04 K/UL  
 DF AUTOMATED METABOLIC PANEL, COMPREHENSIVE Collection Time: 06/05/20  9:45 AM  
Result Value Ref Range Sodium 140 136 - 145 mmol/L Potassium 4.4 3.5 - 5.1 mmol/L Chloride 111 (H) 97 - 108 mmol/L  
 CO2 20 (L) 21 - 32 mmol/L Anion gap 9 5 - 15 mmol/L Glucose 51 (L) 65 - 100 mg/dL BUN 53 (H) 6 - 20 MG/DL Creatinine 3.39 (H) 0.55 - 1.02 MG/DL  
 BUN/Creatinine ratio 16 12 - 20 GFR est AA 16 (L) >60 ml/min/1.73m2 GFR est non-AA 13 (L) >60 ml/min/1.73m2 Calcium 9.3 8.5 - 10.1 MG/DL Bilirubin, total 0.4 0.2 - 1.0 MG/DL  
 ALT (SGPT) 28 12 - 78 U/L  
 AST (SGOT) 37 15 - 37 U/L Alk. phosphatase 62 45 - 117 U/L Protein, total 7.8 6.4 - 8.2 g/dL Albumin 3.6 3.5 - 5.0 g/dL Globulin 4.2 (H) 2.0 - 4.0 g/dL A-G Ratio 0.9 (L) 1.1 - 2.2 MAGNESIUM  Collection Time: 06/05/20  9:45 AM  
 Result Value Ref Range Magnesium 2.1 1.6 - 2.4 mg/dL PROTHROMBIN TIME + INR Collection Time: 06/05/20  9:45 AM  
Result Value Ref Range INR 1.0 0.9 - 1.1 Prothrombin time 9.9 9.0 - 11.1 sec PTT Collection Time: 06/05/20  9:45 AM  
Result Value Ref Range aPTT 29.5 22.1 - 32.0 sec  
 aPTT, therapeutic range     58.0 - 77.0 SECS FIBRINOGEN Collection Time: 06/05/20  9:45 AM  
Result Value Ref Range Fibrinogen 607 (H) 200 - 475 mg/dL C REACTIVE PROTEIN, QT Collection Time: 06/05/20  9:45 AM  
Result Value Ref Range C-Reactive protein 7.18 (H) 0.00 - 0.60 mg/dL LD Collection Time: 06/05/20  9:45 AM  
Result Value Ref Range  (H) 81 - 246 U/L FERRITIN Collection Time: 06/05/20  9:45 AM  
Result Value Ref Range Ferritin 111 8 - 252 NG/ML  
D DIMER Collection Time: 06/05/20  9:45 AM  
Result Value Ref Range D-dimer 0.47 0.00 - 0.65 mg/L FEU  
TROPONIN I Collection Time: 06/05/20  9:45 AM  
Result Value Ref Range Troponin-I, Qt. 0.14 (H) <0.05 ng/mL POC LACTIC ACID Collection Time: 06/05/20 10:05 AM  
Result Value Ref Range Lactic Acid (POC) 1.05 0.40 - 2.00 mmol/L  
RESPIRATORY PANEL,PCR,NASOPHARYNGEAL Collection Time: 06/05/20 10:07 AM  
Result Value Ref Range Adenovirus Not detected NOTD Coronavirus 229E Not detected NOTD Coronavirus HKU1 Not detected NOTD Coronavirus CVNL63 Not detected NOTD Coronavirus OC43 Not detected NOTD Metapneumovirus Not detected NOTD Rhinovirus and Enterovirus Not detected NOTD Influenza A Not detected NOTD Influenza A, subtype H1 Not detected NOTD Influenza A, subtype H3 Not detected NOTD INFLUENZA A H1N1 PCR Not detected NOTD Influenza B Not detected NOTD Parainfluenza 1 Not detected NOTD Parainfluenza 2 Not detected NOTD Parainfluenza 3 Not detected NOTD Parainfluenza virus 4 Not detected NOTD    
 RSV by PCR Not detected NOTD B. parapertussis, PCR Not detected NOTD Bordetella pertussis - PCR Not detected NOTD Chlamydophila pneumoniae DNA, QL, PCR Not detected NOTD Mycoplasma pneumoniae DNA, QL, PCR Not detected NOTD    
SARS-COV-2 Collection Time: 06/05/20 10:07 AM  
Result Value Ref Range Specimen source Nasopharyngeal    
 Specimen source Nasopharyngeal    
 COVID-19 rapid test Detected (A) NOTD Imaging: 
I have personally reviewed the patients radiographs and have reviewed the reports: 
  
 
  
Sebastien Burk MD

## 2020-06-05 NOTE — PROGRESS NOTES
Admission Medication Reconciliation: 
  
Information obtained from:   
[id number] for [facility]. RxQuery data available¹:  YES Comments/Recommendations:  
? Prednisone 5 mg - The patient had a 30 day supply filled on 4/14/20 and 5/12/20. On 6/2/20 the patient had prednisone 5 mg filled with the instructions to take 10 mg daily for 30 days. The patient reports this helps with pain. ? Updated PTA medication list 
? Verified preferred pharmacy ? Reviewed patient's allergies ¹RxQuery pharmacy benefit data reflects medications filled and processed through the patient's insurance, however  
this data does NOT capture whether the medication was picked up or is currently being taken by the patient. Prior to Admission Medications Prescriptions Last Dose Informant Taking?  
lisinopriL (PRINIVIL, ZESTRIL) 5 mg tablet 6/4/2020 at Unknown time Self Yes Sig: Take 5 mg by mouth daily. predniSONE (DELTASONE) 5 mg tablet 6/4/2020 at Unknown time Self Yes Sig: Take 10 mg by mouth daily. Facility-Administered Medications: None Please contact the main inpatient pharmacy with any questions or concerns at [phone] and we will direct you to the clinical pharmacist covering this patient's care while in-house.   
Marjorie Davidson, StaceyD, BCPS

## 2020-06-05 NOTE — ED NOTES
TRANSFER - OUT REPORT: 
 
Verbal report given to Nina Peacock (name) on Alcon Mansfield  being transferred to 4th Floor (unit) for routine progression of care Report consisted of patients Situation, Background, Assessment and  
Recommendations(SBAR). Information from the following report(s) SBAR, Kardex, ED Summary, [facility] and Recent Results was reviewed with the receiving nurse. Lines:  
Peripheral IV 06/05/20 Right Antecubital (Active) Site Assessment Clean, dry, & intact 6/5/2020  9:56 AM  
Phlebitis Assessment 0 6/5/2020  9:56 AM  
Infiltration Assessment 0 6/5/2020  9:56 AM  
Dressing Status Clean, dry, & intact 6/5/2020  9:56 AM  
Dressing Type Transparent;Tape 6/5/2020  9:56 AM  
Hub Color/Line Status Pink;Patent; Flushed 6/5/2020  9:56 AM  
Action Taken Blood drawn 6/5/2020  9:56 AM  
  
 
Opportunity for questions and clarification was provided. Patient transported with: 
 O2 @ 2 liters Tech

## 2020-06-05 NOTE — PROGRESS NOTES
Advance Care Planning Advance Care Planning Activator (Inpatient) Conversation Note Date of ACP Conversation: 06/05/20 Conversation Conducted with:   Patient with Decision Making Capacity via , Dago Pacheco ACP Activator: Priyanka Cortes Health Care Decision Maker: 
 
 
Current Designated Health Care Decision Maker: Char Bartlett [phone] Care Preferences Ventilation: \"If you were in your present state of health and suddenly became very ill and were unable to breathe on your own, what would your preference be about the use of a ventilator (breathing machine) if it were available to you? \" If patient would desire the use of a ventilator (breathing machine), answer \"yes\", if not \"no\":yes \"If your health worsens and it becomes clear that your chance of recovery is unlikely, what would your preference be about the use of a ventilator (breathing machine) if it were available to you? \" Would the patient desire the use of a ventilator (breathing machine)? YES Resuscitation \"CPR works best to restart the heart when there is a sudden event, like a heart attack, in someone who is otherwise healthy. Unfortunately, CPR does not typically restart the heart for people who have serious health conditions or who are very sick. \" \"In the event your heart stopped as a result of an underlying serious health condition, would you want attempts to be made to restart your heart (answer \"yes\" for attempt to resuscitate) or would you prefer a natural death (answer \"no\" for do not attempt to resuscitate)? \" yes 
 
 
[] Yes  [x] No   Educated Patient / Nawaf Glynn regarding differences between Advance Directives and portable DNR orders. Length of ACP Conversation in minutes:  5 minutes Conversation Outcomes: 
[x] ACP discussion completed 
[] Existing advance directive reviewed with patient; no changes to patient's previously recorded wishes [] New Advance Directive completed 
 [] Portable Do Not Resuscitate prepared for Provider review and signature 
[] POLST/POST/MOLST/MOST prepared for Provider review and signature Follow-up plan:   
[] Schedule follow-up conversation to continue planning 
[] Referred individual to Provider for additional questions/concerns  
[] Advised patient/agent/surrogate to review completed ACP document and update if needed with changes in condition, patient preferences or care setting  
 
[] This note routed to one or more involved healthcare providers

## 2020-06-05 NOTE — H&P
[address] 
[phone] Admission History and Physical 
 
 
NAME:       Brii Nielson :       1945 MRN:      [id number] PCP:      Pradip Andrews MD  
 
Date of service:   2020 Chief  Complaint:  Generalized weakness, SOB History Of Presenting Illness:    
 
Ms. Ida Chapman is a 67 y.o. female who is being admitted for suspected pneumonia due to COVID-19 virus. Ms. Ida Chapman presented to our Emergency Department today complaining of a progressively worsening generalized weakness, body aches and SOB especially with exertion. She denies any chest pain, cough or fever. No chills or rigors. She has not had any nausea, vomiting or diarrhea. However, her appetite has been low in the recent past and she has not had much to eat or drink. In the ED, she was found to have COVID-19 and a CXR done showed possible pneumonia. She is Togolese speaking and I discussed with her through a [employer] Phone . She will be admitted for further management. No Known Allergies Prior to Admission medications Not on File Past Medical History:  
Diagnosis Date  
 HTN (hypertension), benign No past surgical history on file. Social History Tobacco Use  Smoking status: Never Smoker  Smokeless tobacco: Never Used Substance Use Topics  Alcohol use: Never Frequency: Never Family History Problem Relation Age of Onset  Diabetes Neg Hx Review of Systems: 
 
Constitutional ROS: no fever, chills, rigors or night sweats Respiratory ROS: no cough, sputum, hemoptysis but dyspnea Cardiovascular ROS: no chest pain, palpitations, orthopnea, PND or syncope Endocrine ROS: no polydispsia, polyuria, heat or cold intolerance or major weight change. Gastrointestinal ROS: no dysphagia, abdominal pain, nausea, vomiting or diarrhea Genito-Urinary ROS: no dysuria, frequency, hematuria, retention or flank pain Musculoskeletal ROS: no joint pain, swelling or muscular tenderness Neurological ROS: no headache, confusion, focal weakness or any other neurological symptoms Psychiatric ROS: no depression, anxiety, mood swings Dermatological ROS: no rash, pruritis, or urticaria Heme-Lymph ROS: no swollen glands, bleeding Examination: 
 
Constitutional:   
Visit Vitals /83 Pulse 85 Temp 100.4 °F (38 °C) Resp 22 SpO2 97% General:  Weak and ill looking patient in no acute distress Eyes: Pink conjunctivae, PERRLA with no discharge. Normal eye movements Ear, Nose, Mouth & Throat: No ottorrhea, rhinorrhea, non tender sinuses, dry mucous membranes Respiratory:  No accessory muscle use, decreased but clear breath sounds without crackles or wheezes Cardiovascular:  No JVD or murmurs, regular and normal S1, S2 without thrills, bruits or peripheral edema. GI & :  Soft abdomen, non-tender, non-distended, normoactive bowel sounds with no palpable organomegaly Heme:  No cervical or axillary adenopathy. Musculoskeletal:  No cyanosis, clubbing, atrophy or deformities Skin:  No rashes, bruising or ulcers Neurological: Awake and alert, speech is clear, CNs 2-12 are grossly intact and otherwise non focal 
Psychiatric:  Has a fair insight to her illness  
________________________________________________________________________ Data Review: 
 
Labs: 
 
Recent Labs  
  06/05/20 
0945 WBC 11.0 HGB 13.5 HCT 42.6  Recent Labs  
  06/05/20 
0945   
K 4.4  
* CO2 20* GLU 51* BUN 53* CREA 3.39* CA 9.3 MG 2.1 ALB 3.6 ALT 28 No components found for: [place] No results for input(s): PH, PCO2, PO2, HCO3, FIO2 in the last 72 hours. Recent Labs  
  06/05/20 
0945 INR 1.0 Imaging Studies: Chest X-ray - reviewed Other Medical tests: 
 
Personally reviewed 12 lead EKG: Normal rate, rhythm, axis and intervals. and No acute changes suggestive of ischemia I have also reviewed available old medical records. Assessment & Impression: 
  
Ms. Austin Chandra is a 67 y.o. female being evaluated for:  
 
Principal Problem: 
  Pneumonia due to COVID-19 virus (6/5/2020) Active Problems: 
  LESLEY (acute kidney injury) ([facility] [address] (6/5/2020) HTN (hypertension), benign (6/5/2020) SOB (shortness of breath) (6/5/2020) Plan of management: 
 
Pneumonia due to COVID-19 virus (6/5/2020): suspected. At risk for worsening. Admit to hospital. Will monitor inflammatory markers. Start empiric IV Ceftriaxone and Azithromycin. Start Lipitor, Ascorbic acid and Zinc. Consult pulmonology LESLEY (acute kidney injury) ([facility] [address] (6/5/2020): likely pre-renal from poor intake. Start IV fluids and follow renal panel and urine output HTN (hypertension), benign (6/5/2020): Verify home medications. IV Hydralazine PRN 
 
SOB (shortness of breath) (6/5/2020): likely due to suspected pneumonia. Has a slight troponin bump. Serial troponin. Treat suspected pneumonia and follow clinically Code Status:  Full Surrogate decision maker: Family Risk of deterioration: high Total time spent for the care of the patient: 79 Minutes Care Plan discussed with: Patient, Nursing Staff and ED physician Discussed:  Code Status, Care Plan and D/C Planning Prophylaxis:  Lovenox Probable Disposition:  Home w/Family 
        
___________________________________________________ Attending Physician: Kaz Amaya MD

## 2020-06-05 NOTE — ED PROVIDER NOTES
This is a 65-year-old female who presents to the emergency room with complaints of generalized weakness, shortness of breath, chest pain and fatigue that started last night. Patient also adds in that she has not been able to eat anything for the past week because \"her food has no taste. \"  Patient has not taken any medication prior to arrival for her symptoms. Has not seen her PCP because \"my clinic is closed. \"  States that she has not been out of her house, has not had any contact with COVID positive patients. Denies dizziness, nausea or vomiting, diarrhea, fever or chills. In triage patient noted to have an SPO2 of 93% at rest.  Ill-appearing on assessment. There are no further complaints at this time. Other, MD Latha 
No past medical history on file. No past surgical history on file. Brii Nielson was evaluated in the Emergency Department on 6/5/2020 for the symptoms described in the history of present illness. He/she was evaluated in the context of the global COVID-19 pandemic, which necessitated consideration that the patient might be at risk for infection with the SARS-CoV-2 virus that causes COVID-19. Institutional protocols and algorithms that pertain to the evaluation of patients at risk for COVID-19 are in a state of rapid change based on information released by regulatory bodies including the CDC and federal and state organizations. These policies and algorithms were followed during the patient's care in the ED. Surrogate Decision Maker (Who do you want to make decisions for you in the event you are not able to?): Extended Emergency Contact Information Primary Emergency Contact: Lyly Akhtar Mobile Phone: [phone] Relation: Other Relative Ventilation (Do you want to be intubated and mechanically ventilated?):  Yes 
 
CPR (Do you want chest compressions and electricity in an attempt to restart your heart?): Yes No past medical history on file. No past surgical history on file. No family history on file. Social History Socioeconomic History  Marital status: SINGLE Spouse name: Not on file  Number of children: Not on file  Years of education: Not on file  Highest education level: Not on file Occupational History  Not on file Social Needs  Financial resource strain: Not on file  Food insecurity Worry: Not on file Inability: Not on file  Transportation needs Medical: Not on file Non-medical: Not on file Tobacco Use  Smoking status: Not on file Substance and Sexual Activity  Alcohol use: Not on file  Drug use: Not on file  Sexual activity: Not on file Lifestyle  Physical activity Days per week: Not on file Minutes per session: Not on file  Stress: Not on file Relationships  Social connections Talks on phone: Not on file Gets together: Not on file Attends Mu-ism service: Not on file Active member of club or organization: Not on file Attends meetings of clubs or organizations: Not on file Relationship status: Not on file  Intimate partner violence Fear of current or ex partner: Not on file Emotionally abused: Not on file Physically abused: Not on file Forced sexual activity: Not on file Other Topics Concern  Not on file Social History Narrative  Not on file ALLERGIES: Patient has no known allergies. Review of Systems Constitutional: Positive for appetite change and fatigue. Negative for chills, diaphoresis and fever. HENT: Negative for congestion, ear discharge, ear pain, sinus pressure, sinus pain, sore throat and trouble swallowing. Eyes: Negative for photophobia, pain, redness and visual disturbance. Respiratory: Positive for shortness of breath. Negative for cough, chest tightness and wheezing. Cardiovascular: Positive for chest pain. Negative for palpitations. Gastrointestinal: Negative for abdominal distention, abdominal pain, nausea and vomiting. Endocrine: Negative. Genitourinary: Negative for difficulty urinating, flank pain, frequency and urgency. Musculoskeletal: Negative for back pain, neck pain and neck stiffness. Skin: Negative for color change, pallor, rash and wound. Allergic/Immunologic: Negative. Neurological: Positive for weakness. Negative for dizziness, speech difficulty and headaches. Hematological: Does not bruise/bleed easily. Psychiatric/Behavioral: Negative for behavioral problems. The patient is not nervous/anxious. Vitals:  
 06/05/20 0915 Pulse: 99 Resp: 24 Physical Exam 
Vitals signs and nursing note reviewed. Constitutional:   
   General: She is not in acute distress. Appearance: She is well-developed. She is ill-appearing. HENT:  
   Head: Normocephalic and atraumatic. Right Ear: External ear normal.  
   Left Ear: External ear normal.  
   Nose: Nose normal.  
   Mouth/Throat:  
   Mouth: Mucous membranes are moist.  
Eyes:  
   General:     
   Right eye: No discharge. Left eye: No discharge. Conjunctiva/sclera: Conjunctivae normal.  
   Pupils: Pupils are equal, round, and reactive to light. Neck: Musculoskeletal: Normal range of motion and neck supple. Vascular: No JVD. Trachea: No tracheal deviation. Cardiovascular:  
   Rate and Rhythm: Normal rate and regular rhythm. Pulses: Normal pulses. Heart sounds: Normal heart sounds. No murmur. No gallop. Pulmonary:  
   Effort: No respiratory distress. Breath sounds: No wheezing or rales. Comments: Shortness of breath at rest, hypoxia at rest 92% on room air Chest:  
   Chest wall: No tenderness. Abdominal:  
   General: Bowel sounds are normal. There is no distension. Palpations: Abdomen is soft. Tenderness: There is no abdominal tenderness. There is no guarding or rebound. Genitourinary: 
   Comments: Negative Musculoskeletal: Normal range of motion. General: Tenderness (general body aches) present. Skin: 
   General: Skin is warm and dry. Capillary Refill: Capillary refill takes less than 2 seconds. Coloration: Skin is not pale. Findings: No erythema or rash. Neurological:  
   General: No focal deficit present. Mental Status: She is alert and oriented to person, place, and time. Motor: No weakness. Coordination: Coordination normal.  
Psychiatric:     
   Mood and Affect: Mood normal.     
   Behavior: Behavior normal.     
   Thought Content: Thought content normal.     
   Judgment: Judgment normal.  
 
  
 
MDM Number of Diagnoses or Management Options Acute kidney injury [facility]): new and requires workup COVID-19: new and requires workup Elevated troponin: new and requires workup Fatigue, unspecified type: new and requires workup SOB (shortness of breath): new and requires workup Diagnosis management comments: Admit to the hospitalist service for further evaluation and treatment. Droplet precautions for positive COVID. Patient in agreement with plan of care. Amount and/or Complexity of Data Reviewed Clinical lab tests: ordered and reviewed Tests in the radiology section of CPT®: ordered and reviewed Discuss the patient with other providers: yes (Dr. Colt Boles) ED EKG interpretation: 09:20 AM 
Rhythm: normal sinus rhythm; and regular . Rate (approx.): 96; Axis: normal; P wave: normal; QRS interval: normal ; ST/T wave: normal; Other findings: abnormal ekg. This EKG was interpreted by Aliya Prather DO,ED Provider. Geeist Perfect Serve for Admission 10:53 AM 
 
ED Room Number: JS47/14 Patient Name and age:  Ramya Alston 67 y.o.  female Working Diagnosis: 1. Acute kidney injury ([address] 2. SOB (shortness of breath) 3. Fatigue, unspecified type 4. Elevated troponin 5. COVID-19 COVID-19 Suspicion:  yes Code Status:  Full Code Readmission: no 
Isolation Requirements:  yes Recommended Level of Care:  telemetry Department:[facility] ED - [phone] Other:  COVID POSITIVE Procedures

## 2020-06-06 NOTE — PROGRESS NOTES
[facility], [place] [address] 
[address] [facility], [place], [address] [place] [address] 
[phone] Medical Progress Note NAME:         Royden Gowers :        1945 MRM:        [id number] Date of service: 2020 Subjective: Patient has been seen and examined as a follow up for multiple medical issues. Chart, labs, diagnostics reviewed. SOB, mild cough. Weak. Poor appetite. No nausea or vomiting. Son in law has [phone] Objective: 
 
Vital Signs: 
 
Visit Vitals /63 (BP 1 Location: Left arm, BP Patient Position: At rest) Pulse 90 Temp 100.3 °F (37.9 °C) Resp 20 SpO2 98% Intake/Output Summary (Last 24 hours) at 2020 1026 Last data filed at 2020 6688 Gross per 24 hour Intake 2036.67 ml Output  Net 2036.67 ml Physical Examination: 
 
General:   Weak and ill looking but not in any acute distress Eyes:   pink conjunctivae, PERRLA with no discharge. ENT:   no ottorrhea or rhinorrhea with dry mucous membranes Neck: no masses, thyroid non-tender and trachea central. 
Pulm:  no accessory muscle use, decreased breath sounds without crackles or wheezes Card:  no JVD or murmurs, has regular and normal S1, S2 without thrills, bruits or peripheral edema Abd:  Soft, non-tender, non-distended, normoactive bowel sounds Musc:  No cyanosis, clubbing, atrophy or deformities. Skin:  No rashes, bruising or ulcers. Neuro: Awake and alert. Generally a non focal exam. Follows commands appropriately Psych:  Has a good insight and is oriented x 3 Current Facility-Administered Medications Medication Dose Route Frequency  aspirin chewable tablet 324 mg  324 mg Oral DAILY  sodium chloride (NS) flush 5-40 mL  5-40 mL IntraVENous Q8H  
 sodium chloride (NS) flush 5-40 mL  5-40 mL IntraVENous PRN  
 acetaminophen (TYLENOL) tablet 650 mg  650 mg Oral Q4H PRN  
  naloxone (NARCAN) injection 0.4 mg  0.4 mg IntraVENous PRN  
 ondansetron (ZOFRAN) injection 4 mg  4 mg IntraVENous Q4H PRN  
 enoxaparin (LOVENOX) injection 40 mg  40 mg SubCUTAneous Q24H  
 morphine injection 2 mg  2 mg IntraVENous Q4H PRN  
 ascorbic acid (vitamin C) (VITAMIN C) tablet 500 mg  500 mg Oral BID  zinc sulfate (ZINCATE) 220 (50) mg capsule 1 Cap  1 Cap Oral DAILY  cefTRIAXone (ROCEPHIN) 1 g in 0.9% sodium chloride (MBP/ADV) 50 mL  1 g IntraVENous Q24H  
 0.9% sodium chloride infusion  100 mL/hr IntraVENous CONTINUOUS  
 azithromycin (ZITHROMAX) 500 mg in 0.9% sodium chloride (MBP/ADV) 250 mL  500 mg IntraVENous Q24H  
 atorvastatin (LIPITOR) tablet 40 mg  40 mg Oral DAILY  guaiFENesin ER (MUCINEX) tablet 1,200 mg  1,200 mg Oral BID  famotidine (PEPCID) tablet 20 mg  20 mg Oral QPM  
  
 
Laboratory data and review: 
 
Recent Labs  
  06/06/20 
0647 06/05/20 
0945 WBC 11.0 11.0 HGB 11.8 13.5 HCT 38.4 42.6  187 Recent Labs  
  06/06/20 
2310 06/05/20 
0945  140  
K 5.2* 4.4  
* 111* CO2 20* 20* GLU 67 51* BUN 49* 53* CREA 3.02* 3.39* CA 8.0* 9.3 MG  --  2.1 ALB 2.8* 3.6 ALT 24 28 INR  --  1.0 No components found for: [facility]: 
 
Telemetry reviewed by me:   normal sinus rhythm Assessment and Plan: ?Pneumonia due to COVID-19 virus (6/5/2020): suspected. She remains stable. Family confirm patient's son-in-law had conformed [place]. At risk for worsening. Mycoplama, Bordetella, acute hepatitis panel neg. HIV serology non reactive. Other serologies pending. CRP, LD high but D -dimer and ferritin normal. Appreciate pulmonology consult. Continue empiric IV Ceftriaxone, Azithromycin, Lipitor, Ascorbic acid and Zinc. Monitor clinically.  
  
LESLEY (acute kidney injury) ([facility] [address] (6/5/2020): likely pre-renal from poor intake vs viral injury from COVID-19. SCr remains elevated.  Continue IV fluids and give kayexalate. Monitor renal panel and urine output 
  
HTN (hypertension), benign (6/5/2020): BP stable. Continue to monitor. IV Hydralazine PRN 
  
SOB (shortness of breath) (6/5/2020): likely due to suspected pneumonia. Troponin flat. Treat suspected pneumonia and follow clinically  
  
Total time spent for the patient's care: 35 Minutes Care Plan discussed with: Patient, Family (via phone) and Nursing Staff Discussed:  Care Plan and D/C Planning Prophylaxis:  Lovenox Anticipated Disposition:  Home w/Family 
        
___________________________________________________ Attending Physician:   Sanjana Galvin MD

## 2020-06-07 NOTE — PROGRESS NOTES
[facility] [place] [address] 
[address] [facility], [address] [place] [address] 
[phone] Medical Progress Note NAME:         Terrence Gamez :        1945 MRM:        [id number] Date of service: 2020 Subjective: Patient has been seen and examined as a follow up for multiple medical issues. Chart, labs, diagnostics reviewed. SOB, mild cough. Weak but overall she feels well. No nausea or vomiting. Son in law has [phone] Objective: 
 
Vital Signs: 
 
Visit Vitals /68 (BP 1 Location: Right arm, BP Patient Position: At rest) Pulse (!) 115 Temp 98.3 °F (36.8 °C) Resp 22 Ht 4' 11\" (1.499 m) Wt 78 kg (171 lb 15.3 oz) SpO2 95% BMI 34.73 kg/m² Intake/Output Summary (Last 24 hours) at 2020 1026 Last data filed at 2020 1600 Gross per 24 hour Intake  Output 1 ml Net -1 ml Physical Examination: 
 
General:   Weak and ill looking but not in any acute distress Eyes:   pink conjunctivae, PERRLA with no discharge. ENT:   no ottorrhea or rhinorrhea with dry mucous membranes Pulm: decreased breath sounds without crackles or wheezes Card:  no JVD or murmurs, has regular and normal S1, S2 without thrills, bruits or peripheral edema Abd:  Soft, non-tender, non-distended, normoactive bowel sounds Musc:  No cyanosis, clubbing, atrophy or deformities. Skin:  No rashes, bruising or ulcers. Neuro: Awake and alert. Generally a non focal exam. Follows commands appropriately Psych:  Has a good insight and is oriented x 3 Current Facility-Administered Medications Medication Dose Route Frequency  phenyleph-shark silvia oil-mo-pet (PREPARATION H) ointment   PeriANAL QID PRN  
 sodium bicarbonate tablet 650 mg  650 mg Oral BID  aspirin chewable tablet 324 mg  324 mg Oral DAILY  sodium chloride (NS) flush 5-40 mL  5-40 mL IntraVENous Q8H  
  sodium chloride (NS) flush 5-40 mL  5-40 mL IntraVENous PRN  
 acetaminophen (TYLENOL) tablet 650 mg  650 mg Oral Q4H PRN  
 naloxone (NARCAN) injection 0.4 mg  0.4 mg IntraVENous PRN  
 ondansetron (ZOFRAN) injection 4 mg  4 mg IntraVENous Q4H PRN  
 enoxaparin (LOVENOX) injection 40 mg  40 mg SubCUTAneous Q24H  
 morphine injection 2 mg  2 mg IntraVENous Q4H PRN  
 ascorbic acid (vitamin C) (VITAMIN C) tablet 500 mg  500 mg Oral BID  zinc sulfate (ZINCATE) 220 (50) mg capsule 1 Cap  1 Cap Oral DAILY  cefTRIAXone (ROCEPHIN) 1 g in 0.9% sodium chloride (MBP/ADV) 50 mL  1 g IntraVENous Q24H  
 0.9% sodium chloride infusion  100 mL/hr IntraVENous CONTINUOUS  
 azithromycin (ZITHROMAX) 500 mg in 0.9% sodium chloride (MBP/ADV) 250 mL  500 mg IntraVENous Q24H  
 atorvastatin (LIPITOR) tablet 40 mg  40 mg Oral DAILY  guaiFENesin ER (MUCINEX) tablet 1,200 mg  1,200 mg Oral BID  famotidine (PEPCID) tablet 20 mg  20 mg Oral QPM  
  
 
Laboratory data and review: 
 
Recent Labs  
  06/07/20 
0350 06/06/20 
0647 06/05/20 
0945 WBC 10.5 11.0 11.0 HGB 11.0* 11.8 13.5 HCT 36.0 38.4 42.6  163 187 Recent Labs  
  06/07/20 
0350 06/06/20 
1022 06/06/20 
0647 06/05/20 
0945   --  141 140  
K 4.3  --  5.2* 4.4  
*  --  117* 111* CO2 15*  --  20* 20* *  --  67 51* BUN 45*  --  49* 53* CREA 3.27*  --  3.02* 3.39* CA 7.9*  --  8.0* 9.3 MG  --   --   --  2.1 ALB 2.6*  --  2.8* 3.6 ALT 24  --  24 28 INR 1.0 1.0  --  1.0 No components found for: [facility]: 
 
Telemetry reviewed by me:   normal sinus rhythm Assessment and Plan: ?Pneumonia due to COVID-19 virus (6/5/2020): suspected. She remains stable. Family confirm patient's son-in-law had conformed [place]. At risk for worsening. Mycoplasma IgM, Bordetella, acute hepatitis panel neg. HIV serology non reactive. Other serologies pending.  CRP, LD high but D -dimer and ferritin normal. Appreciate pulmonology consult. She remains stable. Continue empiric IV Ceftriaxone, Azithromycin, Lipitor, Ascorbic acid and Zinc. Follow clinical course. Taper oxygen as needed   
  
LESLEY (acute kidney injury) ([facility] [address] (6/5/2020): likely pre-renal from poor intake vs viral injury from COVID-19. SCr higher today despite hydration. Start oral bicarb. Continue IV fluids and consult nephrology. Monitor renal panel 
  
HTN (hypertension), benign (6/5/2020): BP stable. Continue to monitor. IV Hydralazine PRN 
  
SOB (shortness of breath) (6/5/2020): likely due to suspected pneumonia. Troponin flat. Treat suspected pneumonia and follow clinically  
  
Total time spent for the patient's care: 35 Minutes Care Plan discussed with: Patient, Family (via phone) and Nursing Staff Discussed:  Care Plan and D/C Planning Prophylaxis:  Lovenox Anticipated Disposition:  Home w/Family 
        
___________________________________________________ Attending Physician:   Desi Cline MD

## 2020-06-07 NOTE — PROGRESS NOTES
Called  And discussed with Son In law  And Daughter Paty Dominguez . Son in Con Alejandre Also was on phone call.   
 
Pt sees physicians at [facility] and need to obtain labs from there to look at baseline cr

## 2020-06-07 NOTE — CONSULTS
[facility] 
 [address] [facility], [address] [facility] Phone: [phone] NOTE Patient: Maddie Morales MRN: [id number]  PCP: Karin Dutta MD  
:     1945  Age:   67 y.o. Sex:  female Referring physician: Georges Soni MD 
Reason for consultation: 67 y.o. female with Pneumonia due to COVID-19 virus [T71.2, E55.98] complicated by LESLEY Admission Date: 2020  9:05 AM  LOS: 2 days ASSESSMENT and PLAN :  
1 LESLEY/CKD3  
2 CKD SUSPECTED- BASELINE CR UNKNOWN  
3 COVID PUI  
4 HTN  
5 ANEMIA  
6 NAGMA  
7 HYPOXIC RESP FAILURE  
 
PLAN 
1 WILL SEND URINE STUDIES AND RENAL US  
2 WILL CHANGE TO BICARB DRIP  
3 BASELINE CR UNCLEAR- NONE IN CHART  
4 MAY NEED RRT IF RENAL FX WORSENS  
5 NEP WILL FOLLOW Called Daughter [phone] and left voice mail Care Plan discussed with: NURSE Thank you for consulting [facility] in the care of your patient. Subjective: HPI: Maddie Morales is a 67 y.o.  female who has been admitted to the hospital for sob. She has a son in law who is COVID 23. She was tested for COVID 19 . Nep was consulted for LESLEY Pts presenting cr here was 3.4 and cr is 3.3 today. She has multiple episodes of diarrhea as per discussion with nurse . Pt is on 3 liters Past Medical Hx:  
Past Medical History:  
Diagnosis Date  
 HTN (hypertension), benign Past Surgical Hx: 
 No past surgical history on file. No Known Allergies Social Hx:  reports that she has never smoked. She has never used smokeless tobacco. She reports that she does not drink alcohol. Family History Problem Relation Age of Onset  Diabetes Neg Hx Review of Systems: ROS was not obtained as pt was not examined due to COVID 19  As per ASN guidelines Objective:   
Vitals:   
Vitals:  
 20 0352 20 0702 20 1254 20 1516 BP: 125/68  147/75 Pulse: 99 (!) 115 92 96 Resp: 22 Temp: 98.3 °F (36.8 °C)  97.8 °F (36.6 °C) SpO2: 95%  93% Weight:      
Height:      
 
I&O's:  06/06 0701 - 06/07 0700 In: 2036.7 [I.V.:2036.7] Out: 1 Visit Vitals /75 (BP 1 Location: Left arm, BP Patient Position: At rest) Pulse 96 Temp 97.8 °F (36.6 °C) Resp 22 Ht 4' 11\" (1.499 m) Wt 78 kg (171 lb 15.3 oz) SpO2 93% BMI 34.73 kg/m² Physical Exam: exam was deferred due to COVID 19 STATUS  As per ASN/RPA guidilines General:  No apparent Distress ( as per nurse) Lungs : baslilar crackles as per nurse Neurologic: non focal, AAO x 3 Laboratory Results: 
 
Recent Labs  
  06/07/20 
0350 06/06/20 
1022 06/06/20 
0647 06/05/20 
0945   --  141 140  
K 4.3  --  5.2* 4.4  
*  --  117* 111* CO2 15*  --  20* 20* *  --  67 51* BUN 45*  --  49* 53* CREA 3.27*  --  3.02* 3.39* CA 7.9*  --  8.0* 9.3 MG  --   --   --  2.1 ALB 2.6*  --  2.8* 3.6 ALT 24  --  24 28 INR 1.0 1.0  --  1.0 Recent Labs  
  06/07/20 
0350 06/06/20 
4674 06/05/20 
0945 WBC 10.5 11.0 11.0 HGB 11.0* 11.8 13.5 HCT 36.0 38.4 42.6  163 187 No results found for: SDES No results found for: CULT Recent Results (from the past 24 hour(s)) CBC WITH AUTOMATED DIFF Collection Time: 06/07/20  3:50 AM  
Result Value Ref Range WBC 10.5 3.6 - 11.0 K/uL  
 RBC 3.68 (L) 3.80 - 5.20 M/uL  
 HGB 11.0 (L) 11.5 - 16.0 g/dL HCT 36.0 35.0 - 47.0 % MCV 97.8 80.0 - 99.0 FL  
 MCH 29.9 26.0 - 34.0 PG  
 MCHC 30.6 30.0 - 36.5 g/dL  
 RDW 13.9 11.5 - 14.5 % PLATELET 021 022 - 932 K/uL MPV 10.1 8.9 - 12.9 FL  
 NRBC 0.0 0  WBC ABSOLUTE NRBC 0.00 0.00 - 0.01 K/uL NEUTROPHILS 66 32 - 75 % LYMPHOCYTES 27 12 - 49 % MONOCYTES 6 5 - 13 % EOSINOPHILS 0 0 - 7 % BASOPHILS 0 0 - 1 % IMMATURE GRANULOCYTES 1 (H) 0.0 - 0.5 % ABS. NEUTROPHILS 6.9 1.8 - 8.0 K/UL ABS. LYMPHOCYTES 2.9 0.8 - 3.5 K/UL  
 ABS. MONOCYTES 0.7 0.0 - 1.0 K/UL  
 ABS. EOSINOPHILS 0.0 0.0 - 0.4 K/UL  
 ABS. BASOPHILS 0.0 0.0 - 0.1 K/UL  
 ABS. IMM. GRANS. 0.1 (H) 0.00 - 0.04 K/UL  
 DF AUTOMATED METABOLIC PANEL, COMPREHENSIVE Collection Time: 06/07/20  3:50 AM  
Result Value Ref Range Sodium 141 136 - 145 mmol/L Potassium 4.3 3.5 - 5.1 mmol/L Chloride 118 (H) 97 - 108 mmol/L  
 CO2 15 (LL) 21 - 32 mmol/L Anion gap 8 5 - 15 mmol/L Glucose 122 (H) 65 - 100 mg/dL BUN 45 (H) 6 - 20 MG/DL Creatinine 3.27 (H) 0.55 - 1.02 MG/DL  
 BUN/Creatinine ratio 14 12 - 20 GFR est AA 17 (L) >60 ml/min/1.73m2 GFR est non-AA 14 (L) >60 ml/min/1.73m2 Calcium 7.9 (L) 8.5 - 10.1 MG/DL Bilirubin, total 0.4 0.2 - 1.0 MG/DL  
 ALT (SGPT) 24 12 - 78 U/L  
 AST (SGOT) 36 15 - 37 U/L Alk. phosphatase 51 45 - 117 U/L Protein, total 6.4 6.4 - 8.2 g/dL Albumin 2.6 (L) 3.5 - 5.0 g/dL Globulin 3.8 2.0 - 4.0 g/dL A-G Ratio 0.7 (L) 1.1 - 2.2 PROTHROMBIN TIME + INR Collection Time: 06/07/20  3:50 AM  
Result Value Ref Range INR 1.0 0.9 - 1.1 Prothrombin time 10.7 9.0 - 11.1 sec PTT Collection Time: 06/07/20  3:50 AM  
Result Value Ref Range aPTT 31.4 22.1 - 32.0 sec  
 aPTT, therapeutic range     58.0 - 77.0 SECS FIBRINOGEN Collection Time: 06/07/20  3:50 AM  
Result Value Ref Range Fibrinogen 498 (H) 200 - 475 mg/dL D DIMER Collection Time: 06/07/20  3:50 AM  
Result Value Ref Range D-dimer 0.53 0.00 - 0.65 mg/L FEU  
C REACTIVE PROTEIN, QT Collection Time: 06/07/20  3:50 AM  
Result Value Ref Range C-Reactive protein 14.40 (H) 0.00 - 0.60 mg/dL LD Collection Time: 06/07/20  3:50 AM  
Result Value Ref Range  (H) 81 - 246 U/L FERRITIN Collection Time: 06/07/20 11:07 AM  
Result Value Ref Range Ferritin 146 8 - 252 NG/ML Urine dipstick: No results found for: COLOR, APPRN, SPGRU, REFSG, YAMILETH, PROTU, GLUCU, KETU, BILU, UROU, ROSELYN, LEUKU, GLUKE, EPSU, BACTU, WBCU, RBCU, CASTS, UCRY I have reviewed the following: All pertinent labs, microbiology data, radiology imaging for my assessment Medications list Personally Reviewed   [x]      Yes     []               No    
 
Medications: 
Prior to Admission medications Medication Sig Start Date End Date Taking? Authorizing Provider  
lisinopriL (PRINIVIL, ZESTRIL) 5 mg tablet Take 5 mg by mouth daily. Yes Provider, Historical  
predniSONE (DELTASONE) 5 mg tablet Take 10 mg by mouth daily. 6/2/20 7/2/20 Yes Provider, Historical  
  
 
Thank you for allowing us to participate in the care of this patient. We will follow patient. Please dont hesitate to call with any questions Naina Sam MD 
[address] [place] [address] Nephrology [facility] [address] [facility], [address] [place] [address] Phone - [phone] Fax - [phone] 
[email or web address]

## 2020-06-08 NOTE — PROGRESS NOTES
Infectious diseases. Chart reviewed and case discussed with Dr. Dori Maldonado. With CKD and creatinine clearance of 16 would hold off on remdesivir at this time as her respiratory status is stable. If her respiratory status further decompensates will consider remdesivir. The infectious diseases service will follow peripherally

## 2020-06-08 NOTE — PROGRESS NOTES
[facility] Pulmonary, Critical Care, and Sleep Medicine Progress Note Name: Fausto Elmore MRN: [id number] : 1945 Hospital: [address] [facility] Date: 2020 IMPRESSION:  
· COVID-19+ · Elevated troponin · LESLEY, ? CKD · HTN  
  
RECOMMENDATIONS:  
· Supplemental O2 as needed to keep sats > 88% · Continue azithro for 5 days. Stop ceftriaxone - normal PCT and WBC. · Repeat CXR · Send sputum culture if able. · Trend inflammatory markers. · Consider actemra if she further decompensates or her inflammatory markers increase · Continue vitamin C, zinc, lipitor · IS  
· Mucinex · Pepcid/Lovenox Subjective:  
 
Ms. Roc Sapp is a 75yo female w/ history of HTN who presented to the ER on  w/ complaints of shortness of breath, weakness and chest pain. Sats 93% on RA. Has family members that are COVID+. Home med list includes 10mg prednisone that she has apparently been taking since April for \"pain. \" Interval history Afebrile Sats 90% on 2L BP stable CRP 14.70 - stable Creat 2.79 - better Ferritin 144 - stable  - trending up D-dimer 0.56 HIV negative Hep panel negative Mycoplasma IgM negative IL-6 113.5 Quant TB gold in process ECHO: EF 38-82%; grade 1 diastolic dysfunction ROS: Continues with SOB and weakness/fatigue. O2 helpful. Denies fever or chills. Denies fever. Denies CP. Past Medical History:  
Diagnosis Date  
 HTN (hypertension), benign No past surgical history on file. Prior to Admission medications Medication Sig Start Date End Date Taking? Authorizing Provider  
lisinopriL (PRINIVIL, ZESTRIL) 5 mg tablet Take 5 mg by mouth daily. Yes Provider, Historical  
predniSONE (DELTASONE) 5 mg tablet Take 10 mg by mouth daily. 20 Yes Provider, Historical  
 
No Known Allergies Social History Tobacco Use  Smoking status: Never Smoker  Smokeless tobacco: Never Used Substance Use Topics  Alcohol use: Never Frequency: Never Family History Problem Relation Age of Onset  Diabetes Neg Hx Current Facility-Administered Medications Medication Dose Route Frequency  sodium bicarbonate (8.4%) 75 mEq in 0.45% sodium chloride 1,000 mL infusion   IntraVENous CONTINUOUS  
 aspirin chewable tablet 324 mg  324 mg Oral DAILY  sodium chloride (NS) flush 5-40 mL  5-40 mL IntraVENous Q8H  
 enoxaparin (LOVENOX) injection 40 mg  40 mg SubCUTAneous Q24H  
 ascorbic acid (vitamin C) (VITAMIN C) tablet 500 mg  500 mg Oral BID  zinc sulfate (ZINCATE) 220 (50) mg capsule 1 Cap  1 Cap Oral DAILY  cefTRIAXone (ROCEPHIN) 1 g in 0.9% sodium chloride (MBP/ADV) 50 mL  1 g IntraVENous Q24H  
 azithromycin (ZITHROMAX) 500 mg in 0.9% sodium chloride (MBP/ADV) 250 mL  500 mg IntraVENous Q24H  
 atorvastatin (LIPITOR) tablet 40 mg  40 mg Oral DAILY  guaiFENesin ER (MUCINEX) tablet 1,200 mg  1,200 mg Oral BID  famotidine (PEPCID) tablet 20 mg  20 mg Oral QPM  
 
 
Review of Systems: A comprehensive review of systems was negative except for that written in the HPI. Objective:  
Vital Signs:   
Visit Vitals /81 Pulse 88 Temp 98 °F (36.7 °C) Resp 20 Ht 4' 11\" (1.499 m) Wt 78 kg (171 lb 15.3 oz) SpO2 93% BMI 34.73 kg/m² O2 Device: Nasal cannula O2 Flow Rate (L/min): 3 l/min Temp (24hrs), Av.5 °F (36.9 °C), Min:97.8 °F (36.6 °C), Max:100.3 °F (37.9 °C) Intake/Output:  
Last shift:      No intake/output data recorded. Last 3 shifts:  1901 -  0700 In: 725 [P.O.:725] Out: 650 [Urine:400] Intake/Output Summary (Last 24 hours) at 2020 1004 Last data filed at 2020 2340 Gross per 24 hour Intake 485 ml Output 650 ml Net -165 ml Physical Exam:  
General:  Alert, cooperative, no distress, appears stated age. Head:  NCAT Eyes:  EOMI, conjunctive clear Nose: Nares normal, NC in place Throat: MMM Neck:  Supple, FROM, trachea midline Lungs:   Decreased breath sounds Chest wall:  Normal shape, nontender Heart:  RRR Abdomen:   Obese, soft, NT Extremities: No c/c/e Pulses: 2+ Skin: CDI, no rash Lymph nodes: No cervical, supraclavicular lymphadenopathy Neurologic: No focal findings, following commands Data review:  
 
Recent Results (from the past 24 hour(s)) FERRITIN Collection Time: 06/07/20 11:07 AM  
Result Value Ref Range Ferritin 146 8 - 252 NG/ML  
SODIUM, UR, RANDOM Collection Time: 06/07/20  9:03 PM  
Result Value Ref Range Sodium,urine random 80 MMOL/L  
CHLORIDE, URINE RANDOM Collection Time: 06/07/20  9:03 PM  
Result Value Ref Range Chloride,urine random 78 MMOL/L  
CREATININE, UR, RANDOM Collection Time: 06/07/20  9:03 PM  
Result Value Ref Range Creatinine, urine 59.00 mg/dL MICROALBUMIN, UR, RAND W/ MICROALB/CREAT RATIO Collection Time: 06/07/20  9:03 PM  
Result Value Ref Range Microalbumin,urine random 13.50 MG/DL Creatinine, urine 62.10 mg/dL Microalbumin/Creat ratio (mg/g creat) 217 (H) 0 - 30 mg/g PROTHROMBIN TIME + INR Collection Time: 06/08/20  3:46 AM  
Result Value Ref Range INR 1.1 0.9 - 1.1 Prothrombin time 11.0 9.0 - 11.1 sec PTT Collection Time: 06/08/20  3:46 AM  
Result Value Ref Range aPTT 32.5 (H) 22.1 - 32.0 sec  
 aPTT, therapeutic range     58.0 - 77.0 SECS FIBRINOGEN Collection Time: 06/08/20  3:46 AM  
Result Value Ref Range Fibrinogen 578 (H) 200 - 475 mg/dL D DIMER Collection Time: 06/08/20  3:46 AM  
Result Value Ref Range D-dimer 0.56 0.00 - 0.65 mg/L FEU  
C REACTIVE PROTEIN, QT Collection Time: 06/08/20  3:46 AM  
Result Value Ref Range C-Reactive protein 14.70 (H) 0.00 - 0.60 mg/dL LD Collection Time: 06/08/20  3:46 AM  
Result Value Ref Range  (H) 81 - 246 U/L FERRITIN Collection Time: 06/08/20  3:46 AM  
Result Value Ref Range Ferritin 144 26 - 388 NG/ML  
RENAL FUNCTION PANEL Collection Time: 06/08/20  3:46 AM  
Result Value Ref Range Sodium 144 136 - 145 mmol/L Potassium 4.6 3.5 - 5.1 mmol/L Chloride 119 (H) 97 - 108 mmol/L  
 CO2 19 (L) 21 - 32 mmol/L Anion gap 6 5 - 15 mmol/L Glucose 117 (H) 65 - 100 mg/dL BUN 35 (H) 6 - 20 MG/DL Creatinine 2.79 (H) 0.55 - 1.02 MG/DL  
 BUN/Creatinine ratio 13 12 - 20 GFR est AA 20 (L) >60 ml/min/1.73m2 GFR est non-AA 17 (L) >60 ml/min/1.73m2 Calcium 7.5 (L) 8.5 - 10.1 MG/DL Phosphorus 2.6 2.6 - 4.7 MG/DL Albumin 2.3 (L) 3.5 - 5.0 g/dL SAMPLES BEING HELD Collection Time: 06/08/20  3:46 AM  
Result Value Ref Range SAMPLES BEING HELD 1BLU   
 COMMENT Add-on orders for these samples will be processed based on acceptable specimen integrity and analyte stability, which may vary by analyte. Imaging: 
I have personally reviewed the patients radiographs and have reviewed the reports: No new images this morning.   
 
  
Kristen Zapata

## 2020-06-08 NOTE — PROGRESS NOTES
[facility] 
[address] [facility], [address] [place] [address] 
[phone] Medical Progress Note NAME:         Chiquita Mccoy :        1945 MRM:        [id number] Date of service: 2020 Subjective: Patient has been seen and examined as a follow up for multiple medical issues. Chart, labs, diagnostics reviewed. SOB, mild cough. Still on oxygen. No nausea or vomiting. Son in law has [id number] Objective: 
 
Vital Signs: 
 
Visit Vitals /79 (BP 1 Location: Left arm) Pulse 97 Temp 98.1 °F (36.7 °C) Resp 20 Ht 4' 11\" (1.499 m) Wt 78 kg (171 lb 15.3 oz) SpO2 95% BMI 34.73 kg/m² Intake/Output Summary (Last 24 hours) at 2020 1250 Last data filed at 2020 2340 Gross per 24 hour Intake 485 ml Output 650 ml Net -165 ml Physical Examination: 
 
General:   Weak and ill looking but not in any acute distress Eyes:   pink conjunctivae, PERRLA with no discharge. ENT:   no ottorrhea or rhinorrhea with dry mucous membranes Pulm: decreased breath sounds with scattered crackles or wheezes Card:  has regular and normal S1, S2 without thrills, bruits or peripheral edema Abd:  Soft, non-tender, non-distended, normoactive bowel sounds Musc:  No cyanosis, clubbing, atrophy or deformities. Skin:  No rashes, bruising or ulcers. Neuro: Awake and alert. Generally a non focal exam.  
Psych:  Has a fair insight to illness Current Facility-Administered Medications Medication Dose Route Frequency  phenyleph-min oil-petrolatum (PREPARATION H) 0.25-14-74.9 % ointment   PeriANAL QID PRN  
 sodium bicarbonate (8.4%) 75 mEq in 0.45% sodium chloride 1,000 mL infusion   IntraVENous CONTINUOUS  
 aspirin chewable tablet 324 mg  324 mg Oral DAILY  sodium chloride (NS) flush 5-40 mL  5-40 mL IntraVENous Q8H  
  sodium chloride (NS) flush 5-40 mL  5-40 mL IntraVENous PRN  
 acetaminophen (TYLENOL) tablet 650 mg  650 mg Oral Q4H PRN  
 naloxone (NARCAN) injection 0.4 mg  0.4 mg IntraVENous PRN  
 ondansetron (ZOFRAN) injection 4 mg  4 mg IntraVENous Q4H PRN  
 enoxaparin (LOVENOX) injection 40 mg  40 mg SubCUTAneous Q24H  
 morphine injection 2 mg  2 mg IntraVENous Q4H PRN  
 ascorbic acid (vitamin C) (VITAMIN C) tablet 500 mg  500 mg Oral BID  zinc sulfate (ZINCATE) 220 (50) mg capsule 1 Cap  1 Cap Oral DAILY  azithromycin (ZITHROMAX) 500 mg in 0.9% sodium chloride (MBP/ADV) 250 mL  500 mg IntraVENous Q24H  
 atorvastatin (LIPITOR) tablet 40 mg  40 mg Oral DAILY  guaiFENesin ER (MUCINEX) tablet 1,200 mg  1,200 mg Oral BID  famotidine (PEPCID) tablet 20 mg  20 mg Oral QPM  
  
 
Laboratory data and review: 
 
Recent Labs  
  06/07/20 
0350 06/06/20 
0720 WBC 10.5 11.0 HGB 11.0* 11.8 HCT 36.0 38.4  163 Recent Labs  
  06/08/20 
0346 06/07/20 
0350 06/06/20 
1022 06/06/20 
8705  141  --  141  
K 4.6 4.3  --  5.2*  
* 118*  --  117* CO2 19* 15*  --  20* * 122*  --  67  
BUN 35* 45*  --  49* CREA 2.79* 3.27*  --  3.02* CA 7.5* 7.9*  --  8.0*  
PHOS 2.6  --   --   --   
ALB 2.3* 2.6*  --  2.8* ALT  --  24  --  24 INR 1.1 1.0 1.0  -- No components found for: [facility]: 
 
Telemetry reviewed by me:   normal sinus rhythm Assessment and Plan: 
 
Pneumonia due to COVID-19 virus (6/5/2020): CXR done today shows increased infiltrates. Family confirm patient's son-in-law had conformed [place]. Mycoplasma IgM, Bordetella, acute hepatitis panel neg. HIV serology non reactive. Legionella serology pending. CRP, LD high but D -dimer and ferritin normal. Appreciate pulmonology consult. IV Ceftriaxone discontinued.  Continue IV Azithromycin, Lipitor, Ascorbic acid and Zinc. Pulm to consider Anthony pulse +/- advanced therapies.    
  
 LESLEY (acute kidney injury) ([facility] (6/5/2020): likely pre-renal from poor intake vs viral injury from COVID-19. SCr better today. Continue IV bicarb. Appreciate renal consult.  
  
HTN (hypertension), benign (6/5/2020): BP stable. Continue to monitor. IV Hydralazine PRN 
  
SOB (shortness of breath) (6/5/2020): likely due to suspected pneumonia. Troponin flat. Treat suspected pneumonia and follow clinically  
  
Total time spent for the patient's care: 30  Minutes Care Plan discussed with: Patient, Nursing Staff Discussed:  Care Plan and D/C Planning Prophylaxis:  Lovenox Anticipated Disposition:  Home w/Family 
        
___________________________________________________ Attending Physician:   Lionel Sullivan MD

## 2020-06-08 NOTE — PROGRESS NOTES
Called for report on patient coming up to unit. Nurse unavailable and said she will call in a few minutes to give me report.

## 2020-06-08 NOTE — PROGRESS NOTES
Ms. Ida Chapman has had increasing O2 requirements, up to 5L today. Spoke with patient today via the  phone about consenting for pulse Anthony and possibly remdesivir. She would like her daughter to sign the consents and I have call the patient's daughter, but unfortunately have not heard back from her. The consent has been signed by me and is in the patient's beside chart. Updated Dr. Wilber Guerin, Dr. Shima Givens and the patient's nurse [facility].

## 2020-06-08 NOTE — PROGRESS NOTES
[address] [facility] [place] YOB: 1945 Assessment & Plan:  
 
1 LESLEY/CKD3  
- ATN: high FeNa 
- cr better from 3.3 mg to 2.8 mg 
- no hd needed - US when COVID ruled out 
2 CKD SUSPECTED- BASELINE CR UNKNOWN  
- needs information from [employer] - UMAC high at 220 mg/gm 3 COVID PUI  
4 HTN  
5 ANEMIA  
6 NAGMA  
- better 
- on bicarb drip 
7 HYPOXIC RESP FAILURE Subjective:  
CC:LESLEY HPI: Patient seen LESLEY is better Acidosis is better Current Facility-Administered Medications Medication Dose Route Frequency  phenyleph-min oil-petrolatum (PREPARATION H) 0.25-14-74.9 % ointment   PeriANAL QID PRN  
 sodium bicarbonate (8.4%) 75 mEq in 0.45% sodium chloride 1,000 mL infusion   IntraVENous CONTINUOUS  
 aspirin chewable tablet 324 mg  324 mg Oral DAILY  sodium chloride (NS) flush 5-40 mL  5-40 mL IntraVENous Q8H  
 sodium chloride (NS) flush 5-40 mL  5-40 mL IntraVENous PRN  
 acetaminophen (TYLENOL) tablet 650 mg  650 mg Oral Q4H PRN  
 naloxone (NARCAN) injection 0.4 mg  0.4 mg IntraVENous PRN  
 ondansetron (ZOFRAN) injection 4 mg  4 mg IntraVENous Q4H PRN  
 enoxaparin (LOVENOX) injection 40 mg  40 mg SubCUTAneous Q24H  
 morphine injection 2 mg  2 mg IntraVENous Q4H PRN  
 ascorbic acid (vitamin C) (VITAMIN C) tablet 500 mg  500 mg Oral BID  zinc sulfate (ZINCATE) 220 (50) mg capsule 1 Cap  1 Cap Oral DAILY  cefTRIAXone (ROCEPHIN) 1 g in 0.9% sodium chloride (MBP/ADV) 50 mL  1 g IntraVENous Q24H  
 azithromycin (ZITHROMAX) 500 mg in 0.9% sodium chloride (MBP/ADV) 250 mL  500 mg IntraVENous Q24H  
 atorvastatin (LIPITOR) tablet 40 mg  40 mg Oral DAILY  guaiFENesin ER (MUCINEX) tablet 1,200 mg  1,200 mg Oral BID  famotidine (PEPCID) tablet 20 mg  20 mg Oral QPM  
  
 
 
Objective:  
 
Vitals: 
Blood pressure 153/81, pulse 88, temperature 98 °F (36.7 °C), resp.  rate 20, height 4' 11\" (1.499 m), weight 78 kg (171 lb 15.3 oz), SpO2 93 %. Temp (24hrs), Av.5 °F (36.9 °C), Min:97.8 °F (36.6 °C), Max:100.3 °F (37.9 °C) Intake and Output: 
No intake/output data recorded.  1901 -  0700 In: 725 [P.O.:725] Out: 650 [Urine:400] Physical Exam:              
;Limited to preserve PPE 
 
   
ECG/rhythm: 
 
Data Review No results for input(s): TNIPOC in the last 72 hours. No lab exists for component: ITNL Recent Labs  
  20 
0620 
18320 
1833 20 
0945 CPK  --  111  --   --   
TROIQ 0.12*  --  0.14* 0.14* Recent Labs  
  20 
03420 
03520 
0847 20 
0945  141 141 140  
K 4.6 4.3 5.2* 4.4  
* 118* 117* 111* CO2 19* 15* 20* 20* BUN 35* 45* 49* 53* CREA 2.79* 3.27* 3.02* 3.39* * 122* 67 51* PHOS 2.6  --   --   --   
MG  --   --   --  2.1 CA 7.5* 7.9* 8.0* 9.3 ALB 2.3* 2.6* 2.8* 3.6 WBC  --  10.5 11.0 11.0 HGB  --  11.0* 11.8 13.5 HCT  --  36.0 38.4 42.6 PLT  --  164 163 187 Recent Labs  
  20 
0350 20 
1022 INR 1.1 1.0 1.0 PTP 11.0 10.7 10.7 APTT 32.5* 31.4 34.0* Needs: urine analysis, urine sodium, protein and creatinine Lab Results Component Value Date/Time Sodium,urine random 80 2020 09:03 PM  
 Creatinine, urine 59.00 2020 09:03 PM  
 Creatinine, urine 62.10 2020 09:03 PM  
 
 
 
  
 
: Rhoda Martinez MD 
2020 [facility]: 
[email or web address] [place] office: 
[address], [address] Phone: [phone] Fax :     [phone] [place] office: 
[address] [facility] [address] Phone - [phone] Fax - [phone]

## 2020-06-09 NOTE — PROGRESS NOTES
Ms. Corky Kahn initially agreed to consent to Anthony, but then she ultimately declined to sign the consent to proceed with Anthony. Will continue to address, but for now she is not agreeable to Anthony.

## 2020-06-09 NOTE — PROGRESS NOTES
Shift Change: 
 
Bedside and Verbal shift change report given to Araceli Molina RN (oncoming nurse) by Masoud An RN (offgoing nurse). Report included the following information SBAR, Kardex, MAR, Recent Results and Cardiac Rhythm NSR.  
 
 
1050: Patient is now refusing Anthony pulse tx. Carboxy lab drawn and given to Antonieta, RT. Spoke with Dr. Colletta Ridge and Nabil MALONEY in regards to refusal. Also notified Dr. Farhan Evans 1715: Pt accidentally pulled out IV. New 22 G IV placed in right hand. 1845: IV infiltrated in right hand. No IV access at this time. Blue phone  used throughout day for assessments and education Shift Change: 
 
Bedside and Verbal shift change report given to Masoud An RN (oncoming nurse) by Adebayo Sauceda (offgoing nurse). Report included the following information SBAR, Kardex, Intake/Output, MAR, Recent Results and Cardiac Rhythm NSR.

## 2020-06-09 NOTE — PROGRESS NOTES
[facility] 1945 Assessment & Plan:  
 
1 LESLEY/CKD3  
- ATN: high FeNa 
- cr better 2.5 mg 6 9 2020 
- cr 2.8 mg 6 8 2020- 
 cr 3.3 mg 6 7 2020 
- no hd needed - US no hydro2 CKD SUSPECTED- BASELINE CR UNKNOWN  
- needs information from [employer] - UMAC high at 220 mg/gm 3 COVID +Ve - Remdesivir and NO 
4 HTN  
5 ANEMIA  
6 NAGMA  
- better 
- on bicarb drip:DC today 
7 HYPOXIC RESP FAILURE   
- LOOPS PRN Subjective:  
CC:LESLEY HPI: Patient seen . She is now COVID +Ve. Remdesivir and NO being considered. Her acidosis is better LESLEY is better Current Facility-Administered Medications Medication Dose Route Frequency  remdesivir 200 mg in 0.9% sodium chloride 250 mL IVPB  200 mg IntraVENous ONCE Followed by  
Carmela Mcfarland ON 6/10/2020] remdesivir 100 mg in 0.9% sodium chloride 250 mL IVPB  100 mg IntraVENous Q24H  
 heparin (porcine) injection 5,000 Units  5,000 Units SubCUTAneous Q8H  phenyleph-min oil-petrolatum (PREPARATION H) 0.25-14-74.9 % ointment   PeriANAL QID PRN  
 sodium bicarbonate (8.4%) 75 mEq in 0.45% sodium chloride 1,000 mL infusion   IntraVENous CONTINUOUS  
 aspirin chewable tablet 324 mg  324 mg Oral DAILY  sodium chloride (NS) flush 5-40 mL  5-40 mL IntraVENous Q8H  
 sodium chloride (NS) flush 5-40 mL  5-40 mL IntraVENous PRN  
 acetaminophen (TYLENOL) tablet 650 mg  650 mg Oral Q4H PRN  
 naloxone (NARCAN) injection 0.4 mg  0.4 mg IntraVENous PRN  
 ondansetron (ZOFRAN) injection 4 mg  4 mg IntraVENous Q4H PRN  
 morphine injection 2 mg  2 mg IntraVENous Q4H PRN  
 ascorbic acid (vitamin C) (VITAMIN C) tablet 500 mg  500 mg Oral BID  zinc sulfate (ZINCATE) 220 (50) mg capsule 1 Cap  1 Cap Oral DAILY  azithromycin (ZITHROMAX) 500 mg in 0.9% sodium chloride (MBP/ADV) 250 mL  500 mg IntraVENous Q24H  
 atorvastatin (LIPITOR) tablet 40 mg  40 mg Oral DAILY  guaiFENesin ER (MUCINEX) tablet 1,200 mg  1,200 mg Oral BID  famotidine (PEPCID) tablet 20 mg  20 mg Oral QPM  
  
 
 
Objective:  
 
Vitals: 
Blood pressure 142/82, pulse 84, temperature 97.7 °F (36.5 °C), resp. rate 19, height 4' 11\" (1.499 m), weight 78 kg (171 lb 15.3 oz), SpO2 96 %. Temp (24hrs), Av.2 °F (36.8 °C), Min:97.7 °F (36.5 °C), Max:98.7 °F (37.1 °C) Intake and Output: 
701 - 1900 In: 141.7 [I.V.:141.7] Out: -  
1901 -  07 In: 2253.3 [P.O.:485; I.V.:1768.3] Out: - Physical Exam:              
;Limited to preserve PPE 
 
   
ECG/rhythm: 
 
Data Review No results for input(s): TNIPOC in the last 72 hours. No lab exists for component: ITNL No results for input(s): CPK, CKMB, TROIQ in the last 72 hours. Recent Labs  
  20 
0350  144 141  
K 4.0 4.6 4.3 * 119* 118* CO2 23 19* 15*  
BUN 27* 35* 45* CREA 2.50* 2.79* 3.27* GLU 89 117* 122* PHOS 2.1* 2.6  --   
CA 7.5* 7.5* 7.9* ALB 2.4* 2.3* 2.6* WBC  --   --  10.5 HGB  --   --  11.0*  
HCT  --   --  36.0 PLT  --   --  164 Recent Labs  
  20 
0350 INR 1.1 1.1 1.0 PTP 10.9 11.0 10.7 APTT 32.7* 32.5* 31.4 Needs: urine analysis, urine sodium, protein and creatinine Lab Results Component Value Date/Time Sodium,urine random 80 2020 09:03 PM  
 Creatinine, urine 59.00 2020 09:03 PM  
 Creatinine, urine 62.10 2020 09:03 PM  
 
 
 
  
 
: Isabella Pa MD 
2020 [facility]: 
[email or web address] [place] office: 
[address], [address] Phone: [phone] Fax :     [phone] [place] office: 
[address] Phone - [phone] Fax - [phone]

## 2020-06-09 NOTE — PROGRESS NOTES
6/9/2020  
4:06 PM 
CM did not enter pt's room EMR reviewed, pt is continuing to require medical management for COVID 19 PNA COVID + Transitions of Care Plan:  RUR 7% 1. CM following for treatment/response 2. Pulmonary follow for COVID PNA, declined Anthony, on 5L O2 via NC 
3. LESLEY renal following 4. Home when medically stable. 5. Pt's family will transport. Taryn Miller

## 2020-06-09 NOTE — PROGRESS NOTES
Investigational therapy PI documentation and INOPulse protocol for RT 
 
-- Request received for investigational therapy with INOPulse under Intermediate Emergency Access Program with IRB approval: [id number] 
-- Criteria met: COVID positive pneumonia with hypoxia on supplemental O2 between 2-10 L/min by nasal cannula: 5 L 
-- Signed informed consent on chart verified- obtained by Dr. Fab Núñez -- Order for RT-CAM placed- per protocol 
-- No obvious exclusion criteria noted (organic left sided cardiac disease) -- Treatment with INOPulse at 20 ppm (125 mcg dose programmed) for 8-24 hours/day for 3-14 days as toleratedcontinuous use is recommended 
-- Goals of therapy: minimum of 3 days, approved for use for 14 days: do not discontinue until 72 hours of therapy are complete (antiviral effect) -- After starting aCm from Day 0, start weaning down O2 on EVERY SHIFT as tolerated for SpO2 90% until 2 L/min nasal cannula O2 
-- Stop Cam when at least 72 hours of treatment are complete and patient is on 2 L/min of O2  
-- Advise patient to rest/sleep in prone position as much as able  
-- Since the connecting tubing is short, it is OK for patient to switch to longer only O2 NC to use the bathroom etc. But otherwise should be using Cam as much as able  
-- MetHb measurement per protocol (On Day 0 at baseline, then at 1 hour of therapy and then days 3, 6, 9. Chris Tillman ) -- Optimize volume status with diuretics as indicated guided by daily NT ProBNP 
-- Patient will be followed by [facility] ([phone]) while on INOPulse Tricia Xavier MD

## 2020-06-09 NOTE — PROGRESS NOTES
[address] Dosing Services: Antimicrobial Stewardship Progress Note Consult for antibiotic dosing of remdesivir by HAIDER Choudhury (pulmonology) Day of Therapy 1 of 5 Plan: 
Remdesivir 200 mg IV x 1 today followed by Remdesivir 100 mg IV daily x 4 days Monitoring: 
 Date of First Known Symptom Onset: 6/4/20 (evening) - per ED notes from 6/5 Date of hospital admission: 6/5/2020 Date of COVID+ test (not the day of result): 6/5 Baseline SCr:  2.5   CrCl: 18 mL/min - Renal function discussed with provider. Benefit > risk. Baseline ALT (should not give if >390 U/L):  24 Baseline AST (should not give if >185 U/L): 36 First dose given appropriately (submit all med events to [facility]):  pending first dose Baseline O2 requirement: Supplemental O2 as needed to keep sats > 88% (currently on NC) Physician documented patient education regarding remdesivir (Fact sheet for Patients  or Parents/Caregivers): yes Patient meets criteria for initiation of remdesivir under the emergency use authorization (EUA) based on the following: Known or suspected COVID-19 Severe disease (SpO2 ? 94% on RA, requiring supplemental O2, or requiring invasive mechanical ventilation) Acceptable renal function CrCl ? 30 ml/min based on SCr obtained prior to initiation OR CrCl < 30 ml/min but the potential benefit of remdesivir outweighs the risk Acceptable hepatic function (ALT within 5 times ULN) Thank you for the consult, 
Trevin Michaels D, [address]

## 2020-06-09 NOTE — PROGRESS NOTES
[facility] [place] [facility], Critical Care, [facility] Sleep Medicine Progress Note Name: Ken Estevez MRN: [id number] : 1945 Hospital: [address] [facility] Date: 2020 IMPRESSION:  
· COVID-19+ · Elevated troponin · LESLEY, ? CKD · HTN  
  
RECOMMENDATIONS:  
· Supplemental O2 as needed to keep sats > 88% · Continue azithro for 5 days. · I was able to speak with patient's son-in-law and her daughter about both Anthony pulse and remdesivir. Patient was also able to speak to them on the language line and I listened in while she spoke with her daughter. They are agreeable to initiate both. Discussed with nursing, pharmacy, and RT. · Send sputum culture if able. · Add on proBNP. · Trend inflammatory markers. · Could consider actemra if she further decompensates or her inflammatory markers continue to increase · Continue vitamin C, zinc, lipitor · IS  
· Mucinex · Pepcid/Lovenox Subjective:  
 
Ms. Corky Kahn is a 75yo female w/ history of HTN who presented to the ER on  w/ complaints of shortness of breath, weakness and chest pain. Sats 93% on RA. Has family members that are COVID+. Home med list includes 10mg prednisone that she has apparently been taking since April for \"pain. \" Interval history Afebrile Sats 96% on 5L 
BP elevated CRP 15.50 - worse Creat 2.50 - better Ferritin 164 - trending up  - trending up D-dimer 0.84 - trending up HIV negative Hep panel negative Mycoplasma IgM negative IL-6 113.5 Quant TB gold in process CXR worse ECHO: EF 94-35%; grade 1 diastolic dysfunction ROS: Continues with SOB and weakness/fatigue. O2 helpful. Denies fever or chills. Denies fever. Denies CP. Past Medical History:  
Diagnosis Date  
 HTN (hypertension), benign No past surgical history on file. Prior to Admission medications Medication Sig Start Date End Date Taking? Authorizing Provider lisinopriL (PRINIVIL, ZESTRIL) 5 mg tablet Take 5 mg by mouth daily. Yes Provider, Historical  
predniSONE (DELTASONE) 5 mg tablet Take 10 mg by mouth daily. 20 Yes Provider, Historical  
 
No Known Allergies Social History Tobacco Use  Smoking status: Never Smoker  Smokeless tobacco: Never Used Substance Use Topics  Alcohol use: Never Frequency: Never Family History Problem Relation Age of Onset  Diabetes Neg Hx Current Facility-Administered Medications Medication Dose Route Frequency  heparin (porcine) injection 5,000 Units  5,000 Units SubCUTAneous Q8H  
 sodium bicarbonate (8.4%) 75 mEq in 0.45% sodium chloride 1,000 mL infusion   IntraVENous CONTINUOUS  
 aspirin chewable tablet 324 mg  324 mg Oral DAILY  sodium chloride (NS) flush 5-40 mL  5-40 mL IntraVENous Q8H  
 ascorbic acid (vitamin C) (VITAMIN C) tablet 500 mg  500 mg Oral BID  zinc sulfate (ZINCATE) 220 (50) mg capsule 1 Cap  1 Cap Oral DAILY  azithromycin (ZITHROMAX) 500 mg in 0.9% sodium chloride (MBP/ADV) 250 mL  500 mg IntraVENous Q24H  
 atorvastatin (LIPITOR) tablet 40 mg  40 mg Oral DAILY  guaiFENesin ER (MUCINEX) tablet 1,200 mg  1,200 mg Oral BID  famotidine (PEPCID) tablet 20 mg  20 mg Oral QPM  
 
 
Review of Systems: A comprehensive review of systems was negative except for that written in the HPI. Objective:  
Vital Signs:   
Visit Vitals /82 (BP 1 Location: Left arm, BP Patient Position: At rest) Pulse 84 Temp 97.7 °F (36.5 °C) Resp 19 Ht 4' 11\" (1.499 m) Wt 78 kg (171 lb 15.3 oz) SpO2 96% BMI 34.73 kg/m² O2 Device: Nasal cannula O2 Flow Rate (L/min): 5 l/min Temp (24hrs), Av.2 °F (36.8 °C), Min:97.7 °F (36.5 °C), Max:98.7 °F (37.1 °C) Intake/Output:  
Last shift:      701 -  190 In: 141.7 [I.V.:141.7] Out: - Last 3 shifts: 1901 -  0700 In: 2253.3 [P.O.:485; I.V.:1768.3] Out: - Intake/Output Summary (Last 24 hours) at 6/9/2020 0915 Last data filed at 6/9/2020 3909 Gross per 24 hour Intake 2150 ml Output  Net 2150 ml Physical Exam:  
General:  Alert, cooperative, no distress, appears stated age. Head:  NCAT Eyes:  EOMI, conjunctive clear Nose: Nares normal, NC in place Throat: MMM Neck:  Supple, FROM, trachea midline Lungs:   Decreased breath sounds, cracklesin the bases Chest wall:  Normal shape, nontender Heart:  RRR Abdomen:   Obese, soft, NT Extremities: No c/c/e Pulses: 2+ Skin: CDI, no rash Lymph nodes: No cervical, supraclavicular lymphadenopathy Neurologic: No focal findings, following commands Data review:  
 
Recent Results (from the past 24 hour(s)) EKG, 12 LEAD, INITIAL Collection Time: 06/08/20  9:36 AM  
Result Value Ref Range Ventricular Rate 92 BPM  
 Atrial Rate 92 BPM  
 P-R Interval 164 ms QRS Duration 70 ms Q-T Interval 350 ms QTC Calculation (Bezet) 432 ms Calculated P Axis 29 degrees Calculated R Axis -16 degrees Calculated T Axis 43 degrees Diagnosis Normal sinus rhythm Poor Anterior Forces Otherwise normal ECG Confirmed by Lj Cardona MD, [address] ([id number]) on 6/8/2020 2:30:22 PM 
  
PROTHROMBIN TIME + INR Collection Time: 06/09/20  4:14 AM  
Result Value Ref Range INR 1.1 0.9 - 1.1 Prothrombin time 10.9 9.0 - 11.1 sec PTT Collection Time: 06/09/20  4:14 AM  
Result Value Ref Range aPTT 32.7 (H) 22.1 - 32.0 sec  
 aPTT, therapeutic range     58.0 - 77.0 SECS FIBRINOGEN Collection Time: 06/09/20  4:14 AM  
Result Value Ref Range Fibrinogen 671 (H) 200 - 475 mg/dL D DIMER Collection Time: 06/09/20  4:14 AM  
Result Value Ref Range D-dimer 0.84 (H) 0.00 - 0.65 mg/L FEU  
C REACTIVE PROTEIN, QT Collection Time: 06/09/20  4:14 AM  
Result Value Ref Range C-Reactive protein 15.50 (H) 0.00 - 0.60 mg/dL LD  Collection Time: 06/09/20  4:14 AM  
 Result Value Ref Range  (H) 81 - 246 U/L FERRITIN Collection Time: 06/09/20  4:14 AM  
Result Value Ref Range Ferritin 164 26 - 388 NG/ML  
RENAL FUNCTION PANEL Collection Time: 06/09/20  4:14 AM  
Result Value Ref Range Sodium 145 136 - 145 mmol/L Potassium 4.0 3.5 - 5.1 mmol/L Chloride 115 (H) 97 - 108 mmol/L  
 CO2 23 21 - 32 mmol/L Anion gap 7 5 - 15 mmol/L Glucose 89 65 - 100 mg/dL BUN 27 (H) 6 - 20 MG/DL Creatinine 2.50 (H) 0.55 - 1.02 MG/DL  
 BUN/Creatinine ratio 11 (L) 12 - 20 GFR est AA 23 (L) >60 ml/min/1.73m2 GFR est non-AA 19 (L) >60 ml/min/1.73m2 Calcium 7.5 (L) 8.5 - 10.1 MG/DL Phosphorus 2.1 (L) 2.6 - 4.7 MG/DL Albumin 2.4 (L) 3.5 - 5.0 g/dL Imaging: 
I have personally reviewed the patients radiographs and have reviewed the reports: 
CXR (6/8/2020): There are diffuse patchy bilateral infiltrates, increased in density as compared to the prior study Kristen Bellamy

## 2020-06-09 NOTE — PROGRESS NOTES
1930: Bedside and Verbal shift change report given to Ajay Mcneil RN (oncoming nurse) by Frieda Morel RN (offgoing nurse). Report included the following information SBAR and Kardex. 0730: Bedside and Verbal shift change report given to Guillermo Miranda RN (oncoming nurse) by Ajay Mcneil RN (offgoing nurse). Report included the following information SBAR, Kardex, MAR and Accordion. Problem: Breathing Pattern - Ineffective Goal: Ability to achieve and maintain a regular respiratory rate Outcome: Progressing Towards Goal 
  
Problem: Body Temperature -  Risk of, Imbalanced Goal: Ability to maintain a body temperature within defined limits Outcome: Progressing Towards Goal 
  
Problem: Gas Exchange - Impaired Goal: Promote optimal lung function Outcome: Progressing Towards Goal

## 2020-06-10 NOTE — PROGRESS NOTES
[facility] Pulmonary, Critical Care, and Sleep Medicine Progress Note Name: Pierre Wang MRN: [id number] : 1945 Hospital: [address] [place] [address] Date: 6/10/2020 IMPRESSION:  
· COVID-19+ · Elevated troponin · LESLEY, ? CKD · HTN  
  
RECOMMENDATIONS:  
· Supplemental O2 as needed to keep sats > 88% · Completed azithro for 5 days. · Patient initially consented to Anthony pulse yesterday, then refused just prior to initiating therapy. She notified the nursing she is now agreeable again to Anthony pulse. Confirmed with patient via the blue phone language line. Nursing aware and RT notified. · Give dose of Lasix 20mg IV; closely follow creat · Send sputum culture if able. · Trend inflammatory markers - continuing to trend up · Continue remdesivir for 5 days (day 2/5). Repeat LFTs. · Continue vitamin C, zinc, lipitor · IS  
· Mucinex · Replete phos · Pepcid/Lovenox Subjective:  
 
Ms. Tacho Pena is a 75yo female w/ history of HTN who presented to the ER on  w/ complaints of shortness of breath, weakness and chest pain. Sats 93% on RA. Has family members that are COVID+. Home med list includes 10mg prednisone that she has apparently been taking since April for \"pain. \" Interval history Tmax 99 Sats 93% on 3L BP stable CRP 15.60 - trending up Creat 2.40 - better Ferritin 214 - trending up  - trending up D-dimer 0.96 - trending up 
proBNP 2640 - slightly better HIV negative Hep panel negative Mycoplasma IgM negative IL-6 113.5 Quant TB gold in process  CXR worse ECHO: EF 02-12%; grade 1 diastolic dysfunction ROS: Used blue phone for translation. Nursing reports that patient is now agreeable to Anthony pulse and wants to sign consent. This is confirmed with her via the . She continues with SOB and weakness/fatigue. O2 helpful. Denies fever or chills. Denies fever. Denies CP. Past Medical History: Diagnosis Date  
 HTN (hypertension), benign No past surgical history on file. Prior to Admission medications Medication Sig Start Date End Date Taking? Authorizing Provider  
lisinopriL (PRINIVIL, ZESTRIL) 5 mg tablet Take 5 mg by mouth daily. Yes Provider, Historical  
predniSONE (DELTASONE) 5 mg tablet Take 10 mg by mouth daily. 20 Yes Provider, Historical  
 
No Known Allergies Social History Tobacco Use  Smoking status: Never Smoker  Smokeless tobacco: Never Used Substance Use Topics  Alcohol use: Never Frequency: Never Family History Problem Relation Age of Onset  Diabetes Neg Hx Current Facility-Administered Medications Medication Dose Route Frequency  remdesivir 100 mg in 0.9% sodium chloride 250 mL IVPB  100 mg IntraVENous Q24H  
 heparin (porcine) injection 5,000 Units  5,000 Units SubCUTAneous Q8H  
 aspirin chewable tablet 324 mg  324 mg Oral DAILY  sodium chloride (NS) flush 5-40 mL  5-40 mL IntraVENous Q8H  
 ascorbic acid (vitamin C) (VITAMIN C) tablet 500 mg  500 mg Oral BID  zinc sulfate (ZINCATE) 220 (50) mg capsule 1 Cap  1 Cap Oral DAILY  atorvastatin (LIPITOR) tablet 40 mg  40 mg Oral DAILY  guaiFENesin ER (MUCINEX) tablet 1,200 mg  1,200 mg Oral BID  famotidine (PEPCID) tablet 20 mg  20 mg Oral QPM  
 
 
Review of Systems: A comprehensive review of systems was negative except for that written in the HPI. Objective:  
Vital Signs:   
Visit Vitals /87 (BP 1 Location: Right arm, BP Patient Position: At rest) Pulse 90 Temp 97.9 °F (36.6 °C) Resp 20 Ht 4' 11\" (1.499 m) Wt 78 kg (171 lb 15.3 oz) SpO2 93% BMI 34.73 kg/m² O2 Device: Nasal cannula O2 Flow Rate (L/min): 3 l/min Temp (24hrs), Av.2 °F (36.8 °C), Min:97.7 °F (36.5 °C), Max:99 °F (37.2 °C) Intake/Output:  
Last shift:      No intake/output data recorded. Last 3 shifts:  1901 - 06/10 0700 In: 6458 [P.O.:960; I.V.:1910] Out: 500 [Urine:500] Intake/Output Summary (Last 24 hours) at 6/10/2020 9268 Last data filed at 6/10/2020 1080 Gross per 24 hour Intake 720 ml Output 500 ml Net 220 ml Physical Exam:  
General:  Alert, cooperative, no acute distress, appears stated age. Appears fatigued. Head:  NCAT Eyes:  EOMI, conjunctive clear Nose: Nares normal, NC in place Throat: MMM Neck:  Supple, FROM, trachea midline Lungs:   Decreased breath sounds, crackles in the bases. Mild increase WOB with activity/talking. Chest wall:  Normal shape, nontender Heart:  RRR Abdomen:   Obese, soft, NT Extremities: No c/c/e Pulses: 2+ Skin: CDI, no rash Lymph nodes: No cervical, supraclavicular lymphadenopathy Neurologic: No focal findings, following commands Data review:  
 
Recent Results (from the past 24 hour(s)) CARBOXY HEMOGLOBIN Collection Time: 06/09/20 10:55 AM  
Result Value Ref Range Carboxy-Hgb 2.1 (H) 1 - 2 % Methemoglobin 0.2 0 - 1.4 %  
 tHb 11 (L) 14 - 17 g/dL Oxyhemoglobin 95 94 - 97 % O2 SATURATION 98 95 - 99 % PROTHROMBIN TIME + INR Collection Time: 06/10/20  4:02 AM  
Result Value Ref Range INR 1.1 0.9 - 1.1 Prothrombin time 11.4 (H) 9.0 - 11.1 sec PTT Collection Time: 06/10/20  4:02 AM  
Result Value Ref Range aPTT 35.4 (H) 22.1 - 32.0 sec  
 aPTT, therapeutic range     58.0 - 77.0 SECS FIBRINOGEN Collection Time: 06/10/20  4:02 AM  
Result Value Ref Range Fibrinogen 702 (H) 200 - 475 mg/dL D DIMER Collection Time: 06/10/20  4:02 AM  
Result Value Ref Range D-dimer 0.96 (H) 0.00 - 0.65 mg/L FEU  
C REACTIVE PROTEIN, QT Collection Time: 06/10/20  4:02 AM  
Result Value Ref Range C-Reactive protein 15.60 (H) 0.00 - 0.60 mg/dL LD Collection Time: 06/10/20  4:02 AM  
Result Value Ref Range  (H) 81 - 246 U/L FERRITIN Collection Time: 06/10/20  4:02 AM  
Result Value Ref Range Ferritin 214 26 - 388 NG/ML  
NT-PRO BNP Collection Time: 06/10/20  4:02 AM  
Result Value Ref Range NT pro-BNP 2,604 (H) <450 PG/ML  
RENAL FUNCTION PANEL Collection Time: 06/10/20  4:02 AM  
Result Value Ref Range Sodium 142 136 - 145 mmol/L Potassium 3.8 3.5 - 5.1 mmol/L Chloride 112 (H) 97 - 108 mmol/L  
 CO2 23 21 - 32 mmol/L Anion gap 7 5 - 15 mmol/L Glucose 111 (H) 65 - 100 mg/dL BUN 24 (H) 6 - 20 MG/DL Creatinine 2.40 (H) 0.55 - 1.02 MG/DL  
 BUN/Creatinine ratio 10 (L) 12 - 20 GFR est AA 24 (L) >60 ml/min/1.73m2 GFR est non-AA 20 (L) >60 ml/min/1.73m2 Calcium 7.4 (L) 8.5 - 10.1 MG/DL Phosphorus 1.7 (L) 2.6 - 4.7 MG/DL Albumin 2.5 (L) 3.5 - 5.0 g/dL SAMPLES BEING HELD Collection Time: 06/10/20  4:02 AM  
Result Value Ref Range SAMPLES BEING HELD 1BLU   
 COMMENT Add-on orders for these samples will be processed based on acceptable specimen integrity and analyte stability, which may vary by analyte. Imaging: 
I have personally reviewed the patients radiographs and have reviewed the reports: 
CXR (6/8/2020): There are diffuse patchy bilateral infiltrates, increased in density as compared to the prior study.   
 
  
Kristen Anglin

## 2020-06-10 NOTE — PROGRESS NOTES
1930: Bedside and Verbal shift change report given to Hector Boss RN (oncoming nurse) by Miroslava Cruz RN (offgoing nurse). Report included the following information SBAR, Kardex, MAR, and Accordion. 0730: Bedside and Verbal shift change report given to Mahi Cobb RN (oncoming nurse) by Hector Boss RN (offgoing nurse). Report included the following information SBAR, Kardex, MAR, and Accordion. Problem: Airway Clearance - Ineffective Goal: Achieve or maintain patent airway Outcome: Progressing Towards Goal 
  
Problem: Gas Exchange - Impaired Goal: Absence of hypoxia Outcome: Progressing Towards Goal 
  
Problem: Gas Exchange - Impaired Goal: Promote optimal lung function Outcome: Progressing Towards Goal

## 2020-06-10 NOTE — PROGRESS NOTES
[facility] [address] YOB: 1945 Assessment & Plan:  
 
1 LESLEY/CKD3  
- ATN: high FeNa 
- Cr 2.4 mg 6 10 2020 
- cr better 2.5 mg 6 9 2020 
- cr 2.8 mg 6 8 2020- 
- cr 3.3 mg 6 7 2020 
- no hd needed - US no hydro2 CKD SUSPECTED- BASELINE CR UNKNOWN  
- needs information from [employer] [facility] - UMAC high at 220 mg/gm 3 COVID +Ve - Remdesivir and NO 
4 HTN  
5 ANEMIA  
6 NAGMA  
- better 
- on bicarb drip:DC today 7. Resp failure - Lasix prn Subjective:  
CC:LESLEY HPI:  LESLEY is better. Got loops for volume On Remdesivir for COVID with NO. Current Facility-Administered Medications Medication Dose Route Frequency  furosemide (LASIX) injection 20 mg  20 mg IntraVENous ONCE  phosphorus (K PHOS NEUTRAL) 250 mg tablet 1 Tab  1 Tab Oral BID  remdesivir 100 mg in 0.9% sodium chloride 250 mL IVPB  100 mg IntraVENous Q24H  
 heparin (porcine) injection 5,000 Units  5,000 Units SubCUTAneous Q8H  phenyleph-min oil-petrolatum (PREPARATION H) 0.25-14-74.9 % ointment   PeriANAL QID PRN  
 aspirin chewable tablet 324 mg  324 mg Oral DAILY  sodium chloride (NS) flush 5-40 mL  5-40 mL IntraVENous Q8H  
 sodium chloride (NS) flush 5-40 mL  5-40 mL IntraVENous PRN  
 acetaminophen (TYLENOL) tablet 650 mg  650 mg Oral Q4H PRN  
 naloxone (NARCAN) injection 0.4 mg  0.4 mg IntraVENous PRN  
 ondansetron (ZOFRAN) injection 4 mg  4 mg IntraVENous Q4H PRN  
 morphine injection 2 mg  2 mg IntraVENous Q4H PRN  
 ascorbic acid (vitamin C) (VITAMIN C) tablet 500 mg  500 mg Oral BID  zinc sulfate (ZINCATE) 220 (50) mg capsule 1 Cap  1 Cap Oral DAILY  atorvastatin (LIPITOR) tablet 40 mg  40 mg Oral DAILY  guaiFENesin ER (MUCINEX) tablet 1,200 mg  1,200 mg Oral BID  famotidine (PEPCID) tablet 20 mg  20 mg Oral QPM  
  
 
 
Objective:  
 
Vitals: 
Blood pressure 142/87, pulse 90, temperature 97.9 °F (36.6 °C), resp.  rate 20, height 4' 11\" (1.499 m), weight 78 kg (171 lb 15.3 oz), SpO2 92 %. Temp (24hrs), Av.3 °F (36.8 °C), Min:97.9 °F (36.6 °C), Max:99 °F (37.2 °C) Intake and Output: 
No intake/output data recorded. 1901 - 06/10 0700 In: 5439 [P.O.:960; I.V.:1910] Out: 500 [Urine:500] Physical Exam:              
;Limited to preserve PPE 
 
   
ECG/rhythm: 
 
Data Review No results for input(s): TNIPOC in the last 72 hours. No lab exists for component: ITNL No results for input(s): CPK, CKMB, TROIQ in the last 72 hours. Recent Labs  
  06/10/20 
0402 20 
0414 20 
0691  145 144  
K 3.8 4.0 4.6 * 115* 119* CO2 23 23 19* BUN 24* 27* 35* CREA 2.40* 2.50* 2.79* * 89 117* PHOS 1.7* 2.1* 2.6 CA 7.4* 7.5* 7.5* ALB 2.5* 2.4* 2.3* Recent Labs  
  06/10/20 
0402 20 
0414 20 
9900 INR 1.1 1.1 1.1 PTP 11.4* 10.9 11.0 APTT 35.4* 32.7* 32.5* Needs: urine analysis, urine sodium, protein and creatinine Lab Results Component Value Date/Time Sodium,urine random 80 2020 09:03 PM  
 Creatinine, urine 59.00 2020 09:03 PM  
 Creatinine, urine 62.10 2020 09:03 PM  
 
 
 
  
 
: Justina Epley, MD 
6/10/2020 [facility]: 
[email or web address] [place] office: 
[address], [address] Phone: [phone] Fax :     [phone] [place] office: 
[address] [place], [place] Phone - [phone] Fax - [phone]

## 2020-06-10 NOTE — PROGRESS NOTES
Anthony therapy initiated. Pre met HB checked yesterday. Pt on 3L NC with Anthony NC. O2 sat = 92%, HR = 92. 
DEVICE: [id number] CANNULA: [id number] CARTRIDGE LOT#: [id number] CARTRIDGE ID: [id number] Will recheck met HB in 1 hour.

## 2020-06-10 NOTE — PROGRESS NOTES
Suresh [facility]er [place] 78 
[address] [facility], [address] [place] [address] 
[phone] Medical Progress Note NAME:         Alicia Walls :        1945 MRM:        [id number] Date of service: 6/10/2020 Subjective: Patient has been seen and examined as a follow up for multiple medical issues. Chart, labs, diagnostics reviewed. SOB, mild cough. No fever. Weak. Overall stable. Discussed with patient through phone interpretor Objective: 
 
Vital Signs: 
 
Visit Vitals /88 (BP 1 Location: Left arm, BP Patient Position: At rest) Pulse 91 Temp 97.9 °F (36.6 °C) Resp 20 Ht 4' 11\" (1.499 m) Wt 78 kg (171 lb 15.3 oz) SpO2 93% BMI 34.73 kg/m² Intake/Output Summary (Last 24 hours) at 6/10/2020 1354 Last data filed at 6/10/2020 1057 Gross per 24 hour Intake 980 ml Output 500 ml Net 480 ml Physical Examination: 
 
General:   Weak and ill looking but not in any acute distress Eyes:   pink conjunctivae, PERRLA with no discharge. Pulm: decreased breath sounds with scattered crackles or wheezes Card:  has regular and normal S1, S2 without thrills, bruits or peripheral edema Abd:  Soft, non-tender, non-distended, normoactive bowel sounds Musc:  No cyanosis, clubbing, atrophy or deformities. Neuro: Awake and alert. Generally a non focal exam.  
Psych:  Has a fair insight to illness Current Facility-Administered Medications Medication Dose Route Frequency  phosphorus (K PHOS NEUTRAL) 250 mg tablet 1 Tab  1 Tab Oral BID  remdesivir 100 mg in 0.9% sodium chloride 250 mL IVPB  100 mg IntraVENous Q24H  
 heparin (porcine) injection 5,000 Units  5,000 Units SubCUTAneous Q8H  phenyleph-min oil-petrolatum (PREPARATION H) 0.25-14-74.9 % ointment   PeriANAL QID PRN  
 aspirin chewable tablet 324 mg  324 mg Oral DAILY  sodium chloride (NS) flush 5-40 mL  5-40 mL IntraVENous Q8H  
 sodium chloride (NS) flush 5-40 mL  5-40 mL IntraVENous PRN  
 acetaminophen (TYLENOL) tablet 650 mg  650 mg Oral Q4H PRN  
 naloxone (NARCAN) injection 0.4 mg  0.4 mg IntraVENous PRN  
 ondansetron (ZOFRAN) injection 4 mg  4 mg IntraVENous Q4H PRN  
 morphine injection 2 mg  2 mg IntraVENous Q4H PRN  
 ascorbic acid (vitamin C) (VITAMIN C) tablet 500 mg  500 mg Oral BID  zinc sulfate (ZINCATE) 220 (50) mg capsule 1 Cap  1 Cap Oral DAILY  atorvastatin (LIPITOR) tablet 40 mg  40 mg Oral DAILY  guaiFENesin ER (MUCINEX) tablet 1,200 mg  1,200 mg Oral BID  famotidine (PEPCID) tablet 20 mg  20 mg Oral QPM  
  
 
Laboratory data and review: No results for input(s): WBC, HGB, HCT, PLT, HGBEXT, HCTEXT, PLTEXT, HGBEXT, HCTEXT, PLTEXT in the last 72 hours. Recent Labs  
  06/10/20 
0402 06/09/20 
0414 06/08/20 
3430  145 144  
K 3.8 4.0 4.6 * 115* 119* CO2 23 23 19* * 89 117* BUN 24* 27* 35* CREA 2.40* 2.50* 2.79* CA 7.4* 7.5* 7.5* PHOS 1.7* 2.1* 2.6 ALB 2.5*  2.5* 2.4* 2.3* ALT 28  --   --   
INR 1.1 1.1 1.1 No components found for: [facility]: 
 
Telemetry reviewed by me:   normal sinus rhythm Assessment and Plan: 
 
Pneumonia due to COVID-19 virus (6/5/2020): CXR done 6/8 showed increased infiltrates. Family confirm patient's son-in-law had conformed [place]. Mycoplasma IgM, Bordetella, acute hepatitis panel neg. HIV serology non reactive. Legionella serology neg. CRP, LD high and D -dimer now creeping up. Ferritin normal. Seen by pulmonology. Continue Remdesivir started 6/9 and Anthony pulse. Completed Azithromycin. Continue Lipitor, Ascorbic acid and Zinc.     
  
Acute respiratory failure with hypoxia: due to COVID-19. Continue supplemental oxygen @2l/min. Wean as tolerated LESLEY (acute kidney injury) ([facility] [address] (6/5/2020): likely pre-renal from poor intake vs viral injury from COVID-19. SCr continues to improve. Seen by renal. Monitor clinically.   
  
HTN (hypertension), benign (6/5/2020): BP variable. Start Amlodipine. IV Hydralazine PRN 
  
SOB (shortness of breath) (6/5/2020): likely due to suspected pneumonia. Troponin flat. Treat COVID supportively.   
  
Total time spent for the patient's care: 25  Minutes Care Plan discussed with: Patient, Nursing Staff Discussed:  Care Plan and D/C Planning Prophylaxis:  Lovenox Anticipated Disposition:  Home w/Family 
        
___________________________________________________ Attending Physician:   Lorraine Barajas MD

## 2020-06-10 NOTE — PROGRESS NOTES
Problem: Airway Clearance - Ineffective Goal: Achieve or maintain patent airway Outcome: Progressing Towards Goal 
  
Problem: Gas Exchange - Impaired Goal: Absence of hypoxia Outcome: Progressing Towards Goal 
Goal: Promote optimal lung function Outcome: Progressing Towards Goal 
  
Problem: Breathing Pattern - Ineffective Goal: Ability to achieve and maintain a regular respiratory rate Outcome: Progressing Towards Goal 
  
Problem: Body Temperature -  Risk of, Imbalanced Goal: Ability to maintain a body temperature within defined limits Outcome: Progressing Towards Goal 
Goal: Will regain or maintain usual level of consciousness Outcome: Progressing Towards Goal 
Goal: Complications related to the disease process, condition or treatment will be avoided or minimized Outcome: Progressing Towards Goal 
  
Problem: Isolation Precautions - Risk of Spread of Infection Goal: Prevent transmission of infectious organism to others Outcome: Progressing Towards Goal 
  
Problem: Nutrition Deficits Goal: Optimize nutrtional status Outcome: Progressing Towards Goal 
  
Problem: Risk for Fluid Volume Deficit Goal: Maintain normal heart rhythm Outcome: Progressing Towards Goal 
Goal: Maintain absence of muscle cramping Outcome: Progressing Towards Goal 
Goal: Maintain normal serum potassium, sodium, calcium, phosphorus, and pH Outcome: Progressing Towards Goal 
  
Problem: Loneliness or Risk for Loneliness Goal: Demonstrate positive use of time alone when socialization is not possible Outcome: Progressing Towards Goal 
  
Problem: Fatigue Goal: Verbalize increase energy and improved vitality Outcome: Progressing Towards Goal 
  
Problem: Patient Education: Go to Patient Education Activity Goal: Patient/Family Education Outcome: Progressing Towards Goal 
  
Problem: Falls - Risk of 
Goal: *Absence of Falls Description: Document Amanda aCnnon Fall Risk and appropriate interventions in the flowsheet. Outcome: Progressing Towards Goal 
Note: Fall Risk Interventions: 
Mobility Interventions: Communicate number of staff needed for ambulation/transfer Medication Interventions: Teach patient to arise slowly Elimination Interventions: Call light in reach History of Falls Interventions: Bed/chair exit alarm, Room close to nurse's station, Investigate reason for fall, Utilize gait belt for transfer/ambulation Problem: Patient Education: Go to Patient Education Activity Goal: Patient/Family Education Outcome: Progressing Towards Goal

## 2020-06-10 NOTE — PROGRESS NOTES
0700: Verbal shift change report given to Blank Salinas (oncoming nurse) by [facility] (offgoing nurse). Report included the following information SBAR, Kardex, ED Summary, Intake/Output, MAR, Recent Results and Cardiac Rhythm NSR to sinus tachy (100-115, with movement). 0845: Patient signed consent for Anthony therapy. 1045: Reji carboxy-hemoglobin and handed off to Angelina Swift, [address] [place] [address]: Patient desats to mid 80's (83-87%) when getting up to use bedside commode and patient takes off Anthony NC. Nurse went in and put on 3L O2 of normal O2 and patient sats in went to 87-90% on BSC. Patient returned to bed and put Anthony NC back on at 2L and patient O2 sats are 88-93%. HR in 90's in bed and 100-115 while moving. 1900: Bedside shift change report given to [facility] (oncoming nurse) by Blank Salinas (offgoing nurse).  Report included the following information SBAR, Kardex, Intake/Output, MAR, Recent Results,

## 2020-06-10 NOTE — PROGRESS NOTES
Nutrition Assessment: 
 
RECOMMENDATIONS/INTERVENTION(S):  
1. Continue Regular diet at this time to promote adequate intakes. 2. Trial ONS to promote adequate intakes. Recommend Ensure pudding x1/d (170 kcal, 4 gm protein) and Glucerna x1/d (220 kcal, 10 gm protein) - will monitor for tolerance. 3. Monitor PO intakes, GI function, labs, BG, and wt trends. ASSESSMENT:  
11/5: 66 y/o F admitted with PNA d/t COVID-19. Pt seen for LOS. PMH - HTN. COVID-19+, spoke with RN. Pt with 50% intakes today. Meal intake per EMR shows 25-75% intakes. Unable to obtain nutritional Hx at this time.  lb. BMI 34.7 c/w obesity. Hgb A1c 8.8 (6/10/19). BG/, 139, 110, 126. No note of GI distress. LBM 6/9. Skin intact. Labs - BUN 24 H, Cr 2.40 H, Ca 7.4 L, Phos 1.7 L. Meds - ascorbic acid, famotidine, heparin, phosphorus, zinc sulfate, furosemide. Diet Order: Regular 
% Eaten:   
Patient Vitals for the past 72 hrs: 
 % Diet Eaten 06/10/20 1358 50 % 06/10/20 1057 50 % 06/09/20 1532 75 % 06/09/20 0903 75 % 06/07/20 1935 25 % Pertinent Medications: [x] Reviewed Labs: [x] Reviewed Anthropometrics: Height: 4' 11\" (149.9 cm) Weight: 78 kg (171 lb 15.3 oz) IBW (%IBW):   ( ) UBW (%UBW):   (  %) BMI: Body mass index is 34.73 kg/m². This BMI is indicative of: 
 [] Underweight    [] Normal    [] Overweight    [x]  Obesity    []  Extreme Obesity (BMI>40) Estimated Nutrition Needs (Based on): 1528 Kcals/day(REE 1176 x 1.3) , 80 g(78 - 94 gm (1.0 - 1.2 gm/kg)) Protein Carbohydrate: At Least 130 g/day  Fluids: 1528 mL/day (1 ml/kcal) Last BM: 6/9   [x]Active     []Hyperactive  []Hypoactive       [] Absent   BS Skin:    [x] Intact   [] Incision  [] Breakdown   [] DTI   [] Tears/Excoriation/Abrasion  []Edema [] Other: Wt Readings from Last 30 Encounters:  
06/06/20 78 kg (171 lb 15.3 oz) NUTRITION DIAGNOSES:  
Problem:  Inadequate energy intake Etiology: related to decreased ability to consume sufficient energy Signs/Symptoms: as evidenced by intakes meeting <EENs per EMR and report NUTRITION INTERVENTIONS: 
Meals/Snacks: General/healthful diet   Supplements: Commercial supplement GOAL:  
PO intakes >50% + ONS within 3-5 days Cultural, Pentecostal, or Ethnic Dietary Needs: None EDUCATION & DISCHARGE NEEDS:  
 [x] None Identified 
 [] Identified and Education Provided/Documented 
 [] Identified and Pt declined/was not appropriate [x] Interdisciplinary Care Plan Reviewed/Documented  
 [x] Discharge Needs:    CHO consistent diet 
 [] No Nutrition Related Discharge Needs NUTRITION RISK:  
Pt Is At Nutrition Risk  [x] No Nutrition Risk Identified  [] PT SEEN FOR:  
 []  MD Consult: []Calorie Count []Diabetic Diet Education []Diet Education []Electrolyte Management []General Nutrition Management and Supplements []Management of Tube Feeding []TPN Recommendations []  RN Referral:  []MST score >=2 
   []Enteral/Parenteral Nutrition PTA []Pregnant: Gestational DM or Multigestation  
              [] Pressure Ulcer 
 
[]  Low BMI      [x]  Length of Stay       [] Dysphagia Diet         [] Ventilator 
[]  Follow-up Previous Recommendations: 
 [] Implemented          [] Not Implemented          [x] Not Applicable Previous Goal: 
 [] Met              [] Progressing Towards Goal              [] Not Progressing Towards Goal   [x] Not Applicable Ceci Sapp RDN Pager [phone] Phone [phone]

## 2020-06-10 NOTE — PROGRESS NOTES
6/10/2020 3:28 PM EMR reviewed and spoke with pt's attending. CM will continue to follow for discharge needs. JOHN Forman Transitions of Care Plan: 
1. COVID19+ 
2. On 3L of Anthony 3. Home with medically stable 4. Family will transport pt home

## 2020-06-11 NOTE — PROGRESS NOTES
6/11/2020 3:13 PM EMR reviewed and spoke with pt's attending. Pt on day 3 of 5 of remdesivir therapy. CM currently on Anthony pulse. Continuing to follow for final discharge needs. JOHN Grigsby Transitions of Care Plan: 
1. COVID19+ 
2. On 2L of Anthony 3. Home with medically stable 4. Family will transport pt home

## 2020-06-11 NOTE — PROGRESS NOTES
Problem: Airway Clearance - Ineffective Goal: Achieve or maintain patent airway Outcome: Progressing Towards Goal 
  
Problem: Gas Exchange - Impaired Goal: Absence of hypoxia Outcome: Progressing Towards Goal 
Goal: Promote optimal lung function Outcome: Progressing Towards Goal 
  
Problem: Breathing Pattern - Ineffective Goal: Ability to achieve and maintain a regular respiratory rate Outcome: Progressing Towards Goal 
  
Problem: Body Temperature -  Risk of, Imbalanced Goal: Ability to maintain a body temperature within defined limits Outcome: Progressing Towards Goal 
Goal: Will regain or maintain usual level of consciousness Outcome: Progressing Towards Goal 
Goal: Complications related to the disease process, condition or treatment will be avoided or minimized Outcome: Progressing Towards Goal 
  
Problem: Isolation Precautions - Risk of Spread of Infection Goal: Prevent transmission of infectious organism to others Outcome: Progressing Towards Goal 
  
Problem: Nutrition Deficits Goal: Optimize nutrtional status Outcome: Progressing Towards Goal 
  
Problem: Risk for Fluid Volume Deficit Goal: Maintain normal heart rhythm Outcome: Progressing Towards Goal 
Goal: Maintain absence of muscle cramping Outcome: Progressing Towards Goal 
Goal: Maintain normal serum potassium, sodium, calcium, phosphorus, and pH Outcome: Progressing Towards Goal 
  
Problem: Loneliness or Risk for Loneliness Goal: Demonstrate positive use of time alone when socialization is not possible Outcome: Progressing Towards Goal 
  
Problem: Fatigue Goal: Verbalize increase energy and improved vitality Outcome: Progressing Towards Goal 
  
Problem: Patient Education: Go to Patient Education Activity Goal: Patient/Family Education Outcome: Progressing Towards Goal 
  
Problem: Falls - Risk of 
Goal: *Absence of Falls Description: Document Fanny Leroy Fall Risk and appropriate interventions in the flowsheet. Outcome: Progressing Towards Goal 
Note: Fall Risk Interventions: 
Mobility Interventions: Bed/chair exit alarm Medication Interventions: Bed/chair exit alarm Elimination Interventions: Call light in reach History of Falls Interventions: Bed/chair exit alarm, Investigate reason for fall, Room close to nurse's station Problem: Patient Education: Go to Patient Education Activity Goal: Patient/Family Education Outcome: Progressing Towards Goal 
  
Problem: Pressure Injury - Risk of 
Goal: *Prevention of pressure injury Description: Document Sixto Scale and appropriate interventions in the flowsheet. Outcome: Progressing Towards Goal 
Note: Pressure Injury Interventions: 
Sensory Interventions: Assess changes in LOC, Keep linens dry and wrinkle-free, Minimize linen layers Moisture Interventions: Absorbent underpads Activity Interventions: Increase time out of bed Mobility Interventions: HOB 30 degrees or less Nutrition Interventions: Document food/fluid/supplement intake Friction and Shear Interventions: Lift sheet, Minimize layers Problem: Patient Education: Go to Patient Education Activity Goal: Patient/Family Education Outcome: Progressing Towards Goal 
  
Problem: Infection - Risk of, Surgical Site Infection Goal: *Absence of surgical site infection signs and symptoms Outcome: Progressing Towards Goal 
  
Problem: Patient Education: Go to Patient Education Activity Goal: Patient/Family Education Outcome: Progressing Towards Goal 
  
Problem: Risk for Spread of Infection Goal: Prevent transmission of infectious organism to others Description: Prevent the transmission of infectious organisms to other patients, staff members, and visitors. Outcome: Progressing Towards Goal 
  
Problem: Patient Education:  Go to Education Activity Goal: Patient/Family Education Outcome: Progressing Towards Goal

## 2020-06-11 NOTE — PROGRESS NOTES
1930: Bedside and Verbal shift change report given to Toy Fallon RN (oncoming nurse) by Blank Salinas RN (offgoing nurse). Report included the following information SBAR, Kardex, MAR, and Accordion. 0730: Bedside and Verbal shift change report given to Perla Marinelli RN (oncoming nurse) by Toy Fallon RN (offgoing nurse). Report included the following information SBAR, Kardex, MAR, and Accordion. Problem: Airway Clearance - Ineffective Goal: Achieve or maintain patent airway Outcome: Progressing Towards Goal 
  
Problem: Gas Exchange - Impaired Goal: Absence of hypoxia Outcome: Progressing Towards Goal

## 2020-06-11 NOTE — PROGRESS NOTES
[facility] Progress Note Name: Binh Wood MRN: [id number] : 1945 Hospital: [address] [facility] Date: 2020 IMPRESSION:  
· COVID-19+ · Elevated troponin · LESLEY, ? CKD · HTN  
  
RECOMMENDATIONS:  
· Supplemental O2 as needed to keep sats > 88% · Completed azithro for 5 days. · Continue Anthony pulse. · S/p Lasix 20mg IV yesterday; closely follow creat · Send sputum culture if able. · Trend inflammatory markers - continuing to trend up · Continue remdesivir for 5 days (day 3/5). Trend LFTs. · Continue vitamin C, zinc, lipitor · IS  
· Mucinex · Replete K · Pepcid/Lovenox Subjective:  
 
Ms. Tony Kowalski is a 75yo female w/ history of HTN who presented to the ER on  w/ complaints of shortness of breath, weakness and chest pain. Sats 93% on RA. Has family members that are COVID+. Home med list includes 10mg prednisone that she has apparently been taking since April for \"pain. \" Interval history Tmax 99.1 Sats 91% on 3L BP stable HR 90s-100s K 3.4 CRP 16.90 - trending up Creat 2.44 - stable Ferritin 239 - trending up  - trending up D-dimer 1.23 - trending up 
proBNP 1789 - better HIV negative Hep panel negative Mycoplasma IgM negative IL-6 113.5 Quant TB gold indeterminate  CXR worse ECHO: EF 90-76%; grade 1 diastolic dysfunction ROS: Used blue phone for translation. Patient denies all complaints. Denies SOB despite appearing visibly dyspneic while sitting on the bedside commode. Denies CP. Denies fever, chills, or cough. Denies abd pain or LE pain/swelling. Past Medical History:  
Diagnosis Date  
 HTN (hypertension), benign No past surgical history on file. Prior to Admission medications Medication Sig Start Date End Date Taking? Authorizing Provider  
lisinopriL (PRINIVIL, ZESTRIL) 5 mg tablet Take 5 mg by mouth daily.    Yes Provider, Historical  
predniSONE (DELTASONE) 5 mg tablet Take 10 mg by mouth daily. 20 Yes Provider, Historical  
 
No Known Allergies Social History Tobacco Use  Smoking status: Never Smoker  Smokeless tobacco: Never Used Substance Use Topics  Alcohol use: Never Frequency: Never Family History Problem Relation Age of Onset  Diabetes Neg Hx Current Facility-Administered Medications Medication Dose Route Frequency  phosphorus (K PHOS NEUTRAL) 250 mg tablet 1 Tab  1 Tab Oral BID  amLODIPine (NORVASC) tablet 5 mg  5 mg Oral DAILY  remdesivir 100 mg in 0.9% sodium chloride 250 mL IVPB  100 mg IntraVENous Q24H  
 heparin (porcine) injection 5,000 Units  5,000 Units SubCUTAneous Q8H  
 aspirin chewable tablet 324 mg  324 mg Oral DAILY  sodium chloride (NS) flush 5-40 mL  5-40 mL IntraVENous Q8H  
 ascorbic acid (vitamin C) (VITAMIN C) tablet 500 mg  500 mg Oral BID  zinc sulfate (ZINCATE) 220 (50) mg capsule 1 Cap  1 Cap Oral DAILY  atorvastatin (LIPITOR) tablet 40 mg  40 mg Oral DAILY  guaiFENesin ER (MUCINEX) tablet 1,200 mg  1,200 mg Oral BID  famotidine (PEPCID) tablet 20 mg  20 mg Oral QPM  
 
 
Review of Systems: A comprehensive review of systems was negative except for that written in the HPI. Objective:  
Vital Signs:   
Visit Vitals /76 (BP 1 Location: Left arm, BP Patient Position: Sitting) Pulse (!) 107 Temp 99.1 °F (37.3 °C) Resp 20 Ht 4' 11\" (1.499 m) Wt 78 kg (171 lb 15.3 oz) SpO2 91% BMI 34.73 kg/m² O2 Device: Nasal cannula O2 Flow Rate (L/min): 3 l/min Temp (24hrs), Av.6 °F (37 °C), Min:98.1 °F (36.7 °C), Max:99.1 °F (37.3 °C) Intake/Output:  
Last shift:       07 -  1900 In: 240 [P.O.:240] Out: 300 [Urine:300] Last 3 shifts:  1901 -  0700 In: 1050 [P.O.:800; I.V.:250] Out: - Intake/Output Summary (Last 24 hours) at 2020 3660 Last data filed at 6/11/2020 7807 Gross per 24 hour Intake 550 ml Output 300 ml Net 250 ml Physical Exam:  
General:  Alert, cooperative, no acute distress, appears stated age. Appears fatigued. Head:  NCAT Eyes:  EOMI, conjunctive clear Nose: Nares normal, NC in place Throat: MMM Neck:  Supple, FROM, trachea midline Lungs:   Decreased breath sounds, crackles in the bases. Mild increase WOB with activity/talking. Chest wall:  Normal shape, nontender Heart:  RRR Abdomen:   Obese, soft, NT Extremities: No c/c/e Pulses: 2+ Skin: CDI, no rash Lymph nodes: No cervical, supraclavicular lymphadenopathy Neurologic: No focal findings, following commands Data review:  
 
Recent Results (from the past 24 hour(s)) PROTHROMBIN TIME + INR Collection Time: 06/11/20  2:14 AM  
Result Value Ref Range INR 1.2 (H) 0.9 - 1.1 Prothrombin time 12.4 (H) 9.0 - 11.1 sec PTT Collection Time: 06/11/20  2:14 AM  
Result Value Ref Range aPTT 39.3 (H) 22.1 - 32.0 sec  
 aPTT, therapeutic range     58.0 - 77.0 SECS FIBRINOGEN Collection Time: 06/11/20  2:14 AM  
Result Value Ref Range Fibrinogen 649 (H) 200 - 475 mg/dL D DIMER Collection Time: 06/11/20  2:14 AM  
Result Value Ref Range D-dimer 1.23 (H) 0.00 - 0.65 mg/L FEU  
C REACTIVE PROTEIN, QT Collection Time: 06/11/20  2:14 AM  
Result Value Ref Range C-Reactive protein 16.90 (H) 0.00 - 0.60 mg/dL LD Collection Time: 06/11/20  2:14 AM  
Result Value Ref Range  (H) 81 - 246 U/L FERRITIN Collection Time: 06/11/20  2:14 AM  
Result Value Ref Range Ferritin 239 26 - 388 NG/ML  
NT-PRO BNP Collection Time: 06/11/20  2:14 AM  
Result Value Ref Range NT pro-BNP 1,789 (H) <450 PG/ML  
RENAL FUNCTION PANEL Collection Time: 06/11/20  2:14 AM  
Result Value Ref Range Sodium 140 136 - 145 mmol/L Potassium 3.4 (L) 3.5 - 5.1 mmol/L  Chloride 109 (H) 97 - 108 mmol/L  
 CO2 21 21 - 32 mmol/L Anion gap 10 5 - 15 mmol/L Glucose 101 (H) 65 - 100 mg/dL BUN 26 (H) 6 - 20 MG/DL Creatinine 2.44 (H) 0.55 - 1.02 MG/DL  
 BUN/Creatinine ratio 11 (L) 12 - 20 GFR est AA 23 (L) >60 ml/min/1.73m2 GFR est non-AA 19 (L) >60 ml/min/1.73m2 Calcium 7.0 (L) 8.5 - 10.1 MG/DL Phosphorus 2.8 2.6 - 4.7 MG/DL Albumin 2.4 (L) 3.5 - 5.0 g/dL Imaging: 
I have personally reviewed the patients radiographs and have reviewed the reports: 
CXR (6/8/2020): There are diffuse patchy bilateral infiltrates, increased in density as compared to the prior study.   
 
  
Mamta Holguin [facility]

## 2020-06-11 NOTE — PROGRESS NOTES
Change Anthony Cartridge @0010/2l Nc Sat's 93 Hr 86 Cannula Lot [id number] Cartridge Lot [id number] Cartridge ID [id number]

## 2020-06-11 NOTE — PROGRESS NOTES
Suresh Hutchinson [facility] [address] 
[address] 
[phone] Medical Progress Note NAME: Toñito Feliciano :  1945 MRM:  [id number] Date/Time: 2020 Assessment / Plan:  
 
Pneumonia due to COVID-19 virus: CXR on  showed worsening infiltrates. Continue vitamin C, zinc, statin. Remdesivir started , day 3 of 5 today. On Anthony pulse per PCCM. Completed Azithromycin. 
  
Acute respiratory failure with hypoxia: due to COVID-19. Supplemental O2 PRN, wean as tolerated 
  
LESLEY (acute kidney injury): presumed however baseline renal function unknown. Improved since admission. Nephrology following. Monitor BMP 
  
HTN (hypertension), benign: BP better today. Continue Norvasc, IV hydralazine PRN 
  
 
Total time spent:  35 minutes Time spent in the care of this patient including reviewing records, discussing with nursing and/or other providers on the treatment team, obtaining history and examining the patient, and discussing treatment plans. Care Plan discussed with: Patient, Nursing Staff and >50% of time spent in counseling and coordination of care Discussed:  Care Plan and D/C Planning Prophylaxis:  Hep SQ Disposition:  Home w/Family Subjective: Chief Complaint:  Follow up [employer] Chart/notes/labs/studies reviewed, patient examined. Feels better. Denies CP. No fevers. Objective:  
 
 
Vitals:  
 
  
Last 24hrs VS reviewed since prior progress note. Most recent are: 
 
Visit Vitals /70 (BP 1 Location: Left arm, BP Patient Position: At rest) Pulse 94 Temp 98.4 °F (36.9 °C) Resp 20 Ht 4' 11\" (1.499 m) Wt 78 kg (171 lb 15.3 oz) SpO2 91% BMI 34.73 kg/m² SpO2 Readings from Last 6 Encounters:  
20 91% O2 Flow Rate (L/min): 3 l/min Intake/Output Summary (Last 24 hours) at 2020 1646 Last data filed at 2020 1545 Gross per 24 hour Intake 550 ml  
 Output 1000 ml Net -450 ml Exam:  
 
Physical Exam: 
 
Gen: elderly, chronically ill-appearing. NAD HEENT:  Sclerae nonicteric, hearing intact to voice, mucous membranes moist 
Neck:  Supple, without masses. Resp:  No accessory muscle use, mildly diminished without signficant wheezes, rales, or rhonchi 
Card: HR 90s, reg rhythm, without m/r/g. No LE edema. Abd:  +bowel sounds, soft, NTTP, nondistended. No HSM. Neuro: Face symmetric, tongue midline, speech fluent, follows commands appropriately Psych:  Alert, oriented x 3. Fair insight Medications Reviewed: (see below) Lab Data Reviewed: (see below) 
 
______________________________________________________________________ Medications:  
 
Current Facility-Administered Medications Medication Dose Route Frequency  amLODIPine (NORVASC) tablet 5 mg  5 mg Oral DAILY  remdesivir 100 mg in 0.9% sodium chloride 250 mL IVPB  100 mg IntraVENous Q24H  
 heparin (porcine) injection 5,000 Units  5,000 Units SubCUTAneous Q8H  phenyleph-min oil-petrolatum (PREPARATION H) 0.25-14-74.9 % ointment   PeriANAL QID PRN  
 aspirin chewable tablet 324 mg  324 mg Oral DAILY  sodium chloride (NS) flush 5-40 mL  5-40 mL IntraVENous Q8H  
 sodium chloride (NS) flush 5-40 mL  5-40 mL IntraVENous PRN  
 acetaminophen (TYLENOL) tablet 650 mg  650 mg Oral Q4H PRN  
 naloxone (NARCAN) injection 0.4 mg  0.4 mg IntraVENous PRN  
 ondansetron (ZOFRAN) injection 4 mg  4 mg IntraVENous Q4H PRN  
 morphine injection 2 mg  2 mg IntraVENous Q4H PRN  
 ascorbic acid (vitamin C) (VITAMIN C) tablet 500 mg  500 mg Oral BID  zinc sulfate (ZINCATE) 220 (50) mg capsule 1 Cap  1 Cap Oral DAILY  atorvastatin (LIPITOR) tablet 40 mg  40 mg Oral DAILY  guaiFENesin ER (MUCINEX) tablet 1,200 mg  1,200 mg Oral BID  famotidine (PEPCID) tablet 20 mg  20 mg Oral QPM  
  
 
 
 
Lab Review: No results for input(s): WBC, HGB, HCT, PLT, HGBEXT, HCTEXT, PLTEXT in the last 72 hours. Recent Labs  
  06/11/20 
0214 06/10/20 
0402 06/09/20 
6809  142 145  
K 3.4* 3.8 4.0  
* 112* 115* CO2 21 23 23 * 111* 89 BUN 26* 24* 27* CREA 2.44* 2.40* 2.50* CA 7.0* 7.4* 7.5* PHOS 2.8 1.7* 2.1* ALB 2.4* 2.5*  2.5* 2.4* ALT  --  28  --   
INR 1.2* 1.1 1.1 No components found for: [place] No results for input(s): PH, PCO2, PO2, HCO3, FIO2 in the last 72 hours. Recent Labs  
  06/11/20 
0214 06/10/20 
0402 06/09/20 
9467 INR 1.2* 1.1 1.1 No results found for: SDES No results found for: CULT 
        
___________________________________________________ Attending Physician: Lilly Castillo MD

## 2020-06-11 NOTE — PROGRESS NOTES
Suresh Hutchinson [facility] [place] [address] Terrence Gamez YOB: 1945 Assessment & Plan:  
 
1 LESLEY/CKD3  
- ATN: high FeNa 
- Cr 2.44 mg 6 11 2020 
- Cr 2.4 mg 6 10 2020 
- cr 2.5 mg 6 9 2020 
- cr 2.8 mg 6 8 2020- 
- cr 3.3 mg 6 7 2020 
- no hd needed - US no hydro2 CKD SUSPECTED- BASELINE CR UNKNOWN  
- needs information from [facility] - UMAC high at 220 mg/gm 3 COVID +Ve - Remdesivir and NO 
4 HTN  
5 ANEMIA  
- stable 6 NAGMA  
- better 
- on bicarb drip:DC today 7. Resp failure - Lasix prn 
- 2 lit O2 Subjective:  
CC:LESLEY HPI:  LESLEY is stable Got loops for volume On Remdesivir for COVID with NO. Current Facility-Administered Medications Medication Dose Route Frequency  phosphorus (K PHOS NEUTRAL) 250 mg tablet 1 Tab  1 Tab Oral BID  amLODIPine (NORVASC) tablet 5 mg  5 mg Oral DAILY  remdesivir 100 mg in 0.9% sodium chloride 250 mL IVPB  100 mg IntraVENous Q24H  
 heparin (porcine) injection 5,000 Units  5,000 Units SubCUTAneous Q8H  phenyleph-min oil-petrolatum (PREPARATION H) 0.25-14-74.9 % ointment   PeriANAL QID PRN  
 aspirin chewable tablet 324 mg  324 mg Oral DAILY  sodium chloride (NS) flush 5-40 mL  5-40 mL IntraVENous Q8H  
 sodium chloride (NS) flush 5-40 mL  5-40 mL IntraVENous PRN  
 acetaminophen (TYLENOL) tablet 650 mg  650 mg Oral Q4H PRN  
 naloxone (NARCAN) injection 0.4 mg  0.4 mg IntraVENous PRN  
 ondansetron (ZOFRAN) injection 4 mg  4 mg IntraVENous Q4H PRN  
 morphine injection 2 mg  2 mg IntraVENous Q4H PRN  
 ascorbic acid (vitamin C) (VITAMIN C) tablet 500 mg  500 mg Oral BID  zinc sulfate (ZINCATE) 220 (50) mg capsule 1 Cap  1 Cap Oral DAILY  atorvastatin (LIPITOR) tablet 40 mg  40 mg Oral DAILY  guaiFENesin ER (MUCINEX) tablet 1,200 mg  1,200 mg Oral BID  famotidine (PEPCID) tablet 20 mg  20 mg Oral QPM  
  
 
 
Objective:  
 
Vitals: Blood pressure 151/77, pulse 96, temperature 98.4 °F (36.9 °C), resp. rate 20, height 4' 11\" (1.499 m), weight 78 kg (171 lb 15.3 oz), SpO2 90 %. Temp (24hrs), Av.4 °F (36.9 °C), Min:97.9 °F (36.6 °C), Max:99 °F (37.2 °C) Intake and Output: 
701 - 1900 In: 240 [P.O.:240] Out: 300 [Urine:300] 1901 -  07 In: 1050 [P.O.:800; I.V.:250] Out: - Physical Exam:              
;Limited to preserve PPE 
 
   
ECG/rhythm: 
 
Data Review No results for input(s): TNIPOC in the last 72 hours. No lab exists for component: ITNL No results for input(s): CPK, CKMB, TROIQ in the last 72 hours. Recent Labs  
  06/11/20 
0214 06/10/20 
0402 06/09/20 
5065  142 145  
K 3.4* 3.8 4.0  
* 112* 115* CO2 21 23 23 BUN 26* 24* 27* CREA 2.44* 2.40* 2.50* * 111* 89 PHOS 2.8 1.7* 2.1*  
CA 7.0* 7.4* 7.5* ALB 2.4* 2.5*  2.5* 2.4* Recent Labs  
  06/11/20 
0214 06/10/20 
0402 06/09/20 
2418 INR 1.2* 1.1 1.1 PTP 12.4* 11.4* 10.9 APTT 39.3* 35.4* 32.7* Needs: urine analysis, urine sodium, protein and creatinine Lab Results Component Value Date/Time Sodium,urine random 80 2020 09:03 PM  
 Creatinine, urine 59.00 2020 09:03 PM  
 Creatinine, urine 62.10 2020 09:03 PM  
 
 
 
  
 
: Amira Thomas MD 
2020 [facility]: 
[email or web address] [email or web address] [employer] [email or web address] [email or web address] Maynard office: 
[address] [place] [address], [address] [place], [address] [facility] Phone: [phone] Fax :     [phone] [place] office: 
[address] [facility] [address] [facility], [address] Phone - [phone] Fax - [phone]

## 2020-06-12 NOTE — PROGRESS NOTES
[facility] Progress Note Name: Suzy Brito MRN: [id number] : 1945 Hospital: [facility] Date: 2020 IMPRESSION:  
· COVID-19+ · Elevated troponin · LESLEY, ? CKD · HTN  
  
RECOMMENDATIONS:  
· Supplemental O2 as needed to keep sats > 88% · Completed azithro for 5 days. · Continue Anthony pulse · Give Lasix 20mg IV again today; closely follow creat · Trend inflammatory markers - continuing to trend up · Continue remdesivir for 5 days (day 4/5). Trend LFTs. · Continue vitamin C, zinc, lipitor · IS  
· Mucinex · K repleted · Pepcid/Heparin sub q Subjective:  
 
Ms. Jenelle Santos is a 75yo female w/ history of HTN who presented to the ER on  w/ complaints of shortness of breath, weakness and chest pain. Sats 93% on RA. Has family members that are COVID+. Home med list includes 10mg prednisone that she has apparently been taking since April for \"pain. \" 
 
 Interval history Afebrile Sats 90% on 2L BP stable HR 100s CRP 18.90 - trending up Creat 2.49 - stable Ferritin 256 - trending up  - trending up D-dimer 1.57 - trending up 
proBNP 1056 - better HIV negative Hep panel negative Mycoplasma IgM negative IL-6 113.5 Quant TB gold indeterminate  CXR worse  CXR worse ECHO: EF 34-60%; grade 1 diastolic dysfunction ROS: Patient with continued cough, mostly nonproductive, and BE. Denies fever or chills. Denies CP. Denies abd pain or LE pain/swelling. Past Medical History:  
Diagnosis Date  
 HTN (hypertension), benign No past surgical history on file. Prior to Admission medications Medication Sig Start Date End Date Taking? Authorizing Provider  
lisinopriL (PRINIVIL, ZESTRIL) 5 mg tablet Take 5 mg by mouth daily. Yes Provider, Historical  
predniSONE (DELTASONE) 5 mg tablet Take 10 mg by mouth daily.  20 20 Yes Provider, Historical  
 
No Known Allergies Social History Tobacco Use  Smoking status: Never Smoker  Smokeless tobacco: Never Used Substance Use Topics  Alcohol use: Never Frequency: Never Family History Problem Relation Age of Onset  Diabetes Neg Hx Current Facility-Administered Medications Medication Dose Route Frequency  magnesium sulfate 2 g/50 ml IVPB (premix or compounded)  2 g IntraVENous ONCE  potassium chloride SR (KLOR-CON 10) tablet 20 mEq  20 mEq Oral NOW  amLODIPine (NORVASC) tablet 5 mg  5 mg Oral DAILY  remdesivir 100 mg in 0.9% sodium chloride 250 mL IVPB  100 mg IntraVENous Q24H  
 heparin (porcine) injection 5,000 Units  5,000 Units SubCUTAneous Q8H  
 aspirin chewable tablet 324 mg  324 mg Oral DAILY  sodium chloride (NS) flush 5-40 mL  5-40 mL IntraVENous Q8H  
 ascorbic acid (vitamin C) (VITAMIN C) tablet 500 mg  500 mg Oral BID  zinc sulfate (ZINCATE) 220 (50) mg capsule 1 Cap  1 Cap Oral DAILY  atorvastatin (LIPITOR) tablet 40 mg  40 mg Oral DAILY  guaiFENesin ER (MUCINEX) tablet 1,200 mg  1,200 mg Oral BID  famotidine (PEPCID) tablet 20 mg  20 mg Oral QPM  
 
 
Review of Systems: A comprehensive review of systems was negative except for that written in the HPI. Objective:  
Vital Signs:   
Visit Vitals /77 (BP 1 Location: Right arm, BP Patient Position: At rest) Pulse (!) 112 Temp 98.8 °F (37.1 °C) Resp 20 Ht 4' 11\" (1.499 m) Wt 78 kg (171 lb 15.3 oz) SpO2 90% BMI 34.73 kg/m² O2 Device: Nasal cannula(Anthony tx (2 bars left)) O2 Flow Rate (L/min): 2 l/min Temp (24hrs), Av.6 °F (37 °C), Min:98.2 °F (36.8 °C), Max:99.1 °F (37.3 °C) Intake/Output:  
Last shift:      No intake/output data recorded. Last 3 shifts: 06/10 1901 -  0700 In: 550 [P.O.:300; I.V.:250] Out: 1000 [Urine:1000] Intake/Output Summary (Last 24 hours) at 2020 2625 Last data filed at 6/11/2020 1545 Gross per 24 hour Intake  Output 700 ml Net -700 ml Physical Exam:  
General:  Alert, cooperative, no acute distress, appears stated age. Appears fatigued. Head:  NCAT Eyes:  EOMI, conjunctive clear Nose: Nares normal, NC in place Throat: MMM Neck:  Supple, FROM, trachea midline Lungs:   Decreased breath sounds, crackles in the bases. Mild increase WOB with activity/talking. Chest wall:  Normal shape, nontender Heart:  RRR Abdomen:   Obese, soft, NT Extremities: No c/c/e Pulses: 2+ Skin: CDI, no rash Lymph nodes: No cervical, supraclavicular lymphadenopathy Neurologic: No focal findings, following commands Data review:  
 
Recent Results (from the past 24 hour(s)) CBC WITH AUTOMATED DIFF Collection Time: 06/12/20  5:25 AM  
Result Value Ref Range WBC 8.8 3.6 - 11.0 K/uL  
 RBC 3.62 (L) 3.80 - 5.20 M/uL  
 HGB 10.6 (L) 11.5 - 16.0 g/dL HCT 33.7 (L) 35.0 - 47.0 % MCV 93.1 80.0 - 99.0 FL  
 MCH 29.3 26.0 - 34.0 PG  
 MCHC 31.5 30.0 - 36.5 g/dL  
 RDW 13.4 11.5 - 14.5 % PLATELET 859 510 - 890 K/uL MPV 9.5 8.9 - 12.9 FL  
 NRBC 0.2 (H) 0  WBC ABSOLUTE NRBC 0.02 (H) 0.00 - 0.01 K/uL NEUTROPHILS 69 32 - 75 % LYMPHOCYTES 17 12 - 49 % MONOCYTES 11 5 - 13 % EOSINOPHILS 2 0 - 7 % BASOPHILS 0 0 - 1 % IMMATURE GRANULOCYTES 1 (H) 0.0 - 0.5 % ABS. NEUTROPHILS 6.1 1.8 - 8.0 K/UL  
 ABS. LYMPHOCYTES 1.5 0.8 - 3.5 K/UL  
 ABS. MONOCYTES 0.9 0.0 - 1.0 K/UL  
 ABS. EOSINOPHILS 0.2 0.0 - 0.4 K/UL  
 ABS. BASOPHILS 0.0 0.0 - 0.1 K/UL  
 ABS. IMM. GRANS. 0.1 (H) 0.00 - 0.04 K/UL  
 DF AUTOMATED METABOLIC PANEL, COMPREHENSIVE Collection Time: 06/12/20  5:25 AM  
Result Value Ref Range Sodium 141 136 - 145 mmol/L Potassium 3.5 3.5 - 5.1 mmol/L Chloride 110 (H) 97 - 108 mmol/L  
 CO2 23 21 - 32 mmol/L Anion gap 8 5 - 15 mmol/L Glucose 106 (H) 65 - 100 mg/dL  BUN 28 (H) 6 - 20 MG/DL  
 Creatinine 2.49 (H) 0.55 - 1.02 MG/DL  
 BUN/Creatinine ratio 11 (L) 12 - 20 GFR est AA 23 (L) >60 ml/min/1.73m2 GFR est non-AA 19 (L) >60 ml/min/1.73m2 Calcium 7.2 (L) 8.5 - 10.1 MG/DL Bilirubin, total 0.6 0.2 - 1.0 MG/DL  
 ALT (SGPT) 28 12 - 78 U/L  
 AST (SGOT) 58 (H) 15 - 37 U/L Alk. phosphatase 72 45 - 117 U/L Protein, total 6.3 (L) 6.4 - 8.2 g/dL Albumin 2.4 (L) 3.5 - 5.0 g/dL Globulin 3.9 2.0 - 4.0 g/dL A-G Ratio 0.6 (L) 1.1 - 2.2 MAGNESIUM Collection Time: 06/12/20  5:25 AM  
Result Value Ref Range Magnesium 1.2 (L) 1.6 - 2.4 mg/dL PHOSPHORUS Collection Time: 06/12/20  5:25 AM  
Result Value Ref Range Phosphorus 2.7 2.6 - 4.7 MG/DL  
BILIRUBIN, DIRECT Collection Time: 06/12/20  5:25 AM  
Result Value Ref Range Bilirubin, direct 0.3 (H) 0.0 - 0.2 MG/DL  
C REACTIVE PROTEIN, QT Collection Time: 06/12/20  5:25 AM  
Result Value Ref Range C-Reactive protein 18.90 (H) 0.00 - 0.60 mg/dL LD Collection Time: 06/12/20  5:25 AM  
Result Value Ref Range  (H) 81 - 246 U/L  
NT-PRO BNP Collection Time: 06/12/20  5:25 AM  
Result Value Ref Range NT pro-BNP 1,056 (H) <450 PG/ML  
PROTHROMBIN TIME + INR Collection Time: 06/12/20  5:54 AM  
Result Value Ref Range INR 1.3 (H) 0.9 - 1.1 Prothrombin time 12.8 (H) 9.0 - 11.1 sec PTT Collection Time: 06/12/20  5:54 AM  
Result Value Ref Range aPTT 41.3 (H) 22.1 - 32.0 sec  
 aPTT, therapeutic range     58.0 - 77.0 SECS FIBRINOGEN Collection Time: 06/12/20  5:54 AM  
Result Value Ref Range Fibrinogen 671 (H) 200 - 475 mg/dL D DIMER Collection Time: 06/12/20  5:54 AM  
Result Value Ref Range D-dimer 1.57 (H) 0.00 - 0.65 mg/L FEU FERRITIN Collection Time: 06/12/20  5:54 AM  
Result Value Ref Range Ferritin 256 26 - 388 NG/ML  
SAMPLES BEING HELD Collection Time: 06/12/20  5:54 AM  
Result Value Ref Range SAMPLES BEING HELD 1BLU   
 COMMENT Add-on orders for these samples will be processed based on acceptable specimen integrity and analyte stability, which may vary by analyte. Imaging: 
I have personally reviewed the patients radiographs and have reviewed the reports: 
CXR (6/8/2020): There are diffuse patchy bilateral infiltrates, increased in density as compared to the prior study. CXR (6/12/2020): There is patchy bilateral upper lobe airspace disease that has increased as compared to the prior study.   
 
  
Kristen Duong

## 2020-06-12 NOTE — PROGRESS NOTES
Suresh Hutchinson Daniella [place] 78 [address] YOB: 1945 Assessment & Plan:  
 
1 LESLEY/CKD3  
- ATN: high FeNa 
- Cr 2.49 mg 6 12 2020 
- Cr 2.44 mg 6 11 2020 
- Cr 2.4 mg 6 10 2020 
- cr 2.5 mg 6 9 2020 
- cr 2.8 mg 6 8 2020- 
- cr 3.3 mg 6 7 2020 
- no hd needed - US no hydro2 CKD SUSPECTED- BASELINE CR UNKNOWN  
- needs information from [facility] - UMAC high at 220 mg/gm Low Mg 
- being repleted 3 COVID +Ve - Remdesivir and NO 
4 HTN  
5 ANEMIA  
- stable 6 NAGMA  
- better 
- on bicarb drip:DC today 7. Resp failure - Lasix prn 
- pro bnp better - 2 lit O2 Subjective:  
CC:LESLEY HPI:  LESLEY :cr stable. K low side. BNP better, Mg being repleted On Remdesivir for COVID with NO. Current Facility-Administered Medications Medication Dose Route Frequency  magnesium sulfate 2 g/50 ml IVPB (premix or compounded)  2 g IntraVENous ONCE  
 amLODIPine (NORVASC) tablet 5 mg  5 mg Oral DAILY  remdesivir 100 mg in 0.9% sodium chloride 250 mL IVPB  100 mg IntraVENous Q24H  
 heparin (porcine) injection 5,000 Units  5,000 Units SubCUTAneous Q8H  phenyleph-min oil-petrolatum (PREPARATION H) 0.25-14-74.9 % ointment   PeriANAL QID PRN  
 aspirin chewable tablet 324 mg  324 mg Oral DAILY  sodium chloride (NS) flush 5-40 mL  5-40 mL IntraVENous Q8H  
 sodium chloride (NS) flush 5-40 mL  5-40 mL IntraVENous PRN  
 acetaminophen (TYLENOL) tablet 650 mg  650 mg Oral Q4H PRN  
 naloxone (NARCAN) injection 0.4 mg  0.4 mg IntraVENous PRN  
 ondansetron (ZOFRAN) injection 4 mg  4 mg IntraVENous Q4H PRN  
 morphine injection 2 mg  2 mg IntraVENous Q4H PRN  
 ascorbic acid (vitamin C) (VITAMIN C) tablet 500 mg  500 mg Oral BID  zinc sulfate (ZINCATE) 220 (50) mg capsule 1 Cap  1 Cap Oral DAILY  atorvastatin (LIPITOR) tablet 40 mg  40 mg Oral DAILY  guaiFENesin ER (MUCINEX) tablet 1,200 mg  1,200 mg Oral BID  
  famotidine (PEPCID) tablet 20 mg  20 mg Oral QPM  
  
 
 
Objective:  
 
Vitals: 
Blood pressure 132/77, pulse (!) 112, temperature 98.8 °F (37.1 °C), resp. rate 20, height 4' 11\" (1.499 m), weight 78 kg (171 lb 15.3 oz), SpO2 90 %. Temp (24hrs), Av.6 °F (37 °C), Min:98.2 °F (36.8 °C), Max:99.1 °F (37.3 °C) Intake and Output: 
701 - 1900 In: -  
Out: 75 [Urine:75] 
06/10 1901 -  07 In: 550 [P.O.:300; I.V.:250] Out: 1000 [Urine:1000] Physical Exam:              
;Limited to preserve PPE 
 
   
ECG/rhythm: 
 
Data Review No results for input(s): TNIPOC in the last 72 hours. No lab exists for component: ITNL No results for input(s): CPK, CKMB, TROIQ in the last 72 hours. Recent Labs  
  20 
0525 06/11/20 
0214 06/10/20 
0402  140 142  
K 3.5 3.4* 3.8 * 109* 112* CO2 23 21 23 BUN 28* 26* 24* CREA 2.49* 2.44* 2.40* * 101* 111* PHOS 2.7 2.8 1.7* MG 1.2*  --   --   
CA 7.2* 7.0* 7.4* ALB 2.4* 2.4* 2.5*  2.5* WBC 8.8  --   --   
HGB 10.6*  --   --   
HCT 33.7*  --   --   
  --   --   
  
Recent Labs  
  20 
0554 20 
0214 06/10/20 
0402 INR 1.3* 1.2* 1.1 PTP 12.8* 12.4* 11.4* APTT 41.3* 39.3* 35.4* Needs: urine analysis, urine sodium, protein and creatinine Lab Results Component Value Date/Time Sodium,urine random 80 2020 09:03 PM  
 Creatinine, urine 59.00 2020 09:03 PM  
 Creatinine, urine 62.10 2020 09:03 PM  
 
 
 
  
 
: Param Olivas MD 
2020 [facility]: 
[email or web address] [facility] office: 
[address], [facility] [address] Phone: [phone] Fax :     [phone] [place] office: 
[address] [facility] Phone - [phone] Fax - [phone]

## 2020-06-12 NOTE — PROGRESS NOTES
6/12/2020 4:45 PM EMR reviewed, follow for final discharge needs. Pt on day 4 of remdesivir therapy. JOHN Pickard 
 
  
Transitions of Care Plan: 
1. COVID19+ 
2. On 2L of Anthony 3. Home with medically stable 4. Family will transport pt home

## 2020-06-12 NOTE — PROGRESS NOTES
Suresh Hutchinson [facility] [address] 
[address] [facility], [place], [id number] [place] [address] 
[phone] Medical Progress Note NAME: Alicia Walls :  1945 MRM:  [id number] Date/Time: 2020 Assessment / Plan:  
 
Pneumonia due to COVID-19 virus: CXR on  showed worsening infiltrates. Continue vitamin C, zinc, statin. Remdesivir started , day 4 of 5 today. On Anthony pulse per PCCM. Completed azithromycin. Follow d-dimer. Low threshold to use full dose anticoagulation 
  
Acute respiratory failure with hypoxia: due to COVID-19. Supplemental O2 PRN, wean as tolerated 
  
LESLEY (acute kidney injury): presumed however baseline renal function unknown, suspect at least CKD3. Obtain records from [facility]. Improved since admission and remaining stable. Nephrology following. Monitor BMP 
  
HTN (hypertension), benign: BP controlled. Continue Norvasc, IV hydralazine PRN 
  
 
Total time spent:  25 minutes Time spent in the care of this patient including reviewing records, discussing with nursing and/or other providers on the treatment team, obtaining history and examining the patient, and discussing treatment plans. Care Plan discussed with: Patient, Nursing Staff and >50% of time spent in counseling and coordination of care Discussed:  Care Plan and D/C Planning Prophylaxis:  Hep SQ Disposition:  Home w/Family Subjective: Chief Complaint:  Follow up [place] Chart/notes/labs/studies reviewed, patient examined. Feels okay. Denies CP. No F/C Objective:  
 
 
Vitals:  
 
  
Last 24hrs VS reviewed since prior progress note. Most recent are: 
 
Visit Vitals /79 (BP 1 Location: Right arm, BP Patient Position: At rest) Pulse (!) 110 Temp 98.9 °F (37.2 °C) Resp 20 Ht 4' 11\" (1.499 m) Wt 78 kg (171 lb 15.3 oz) SpO2 92% BMI 34.73 kg/m² SpO2 Readings from Last 6 Encounters:  
20 92% O2 Flow Rate (L/min): 2 l/min Intake/Output Summary (Last 24 hours) at 6/12/2020 1824 Last data filed at 6/12/2020 1542 Gross per 24 hour Intake  Output 325 ml Net -325 ml Exam:  
 
Physical Exam: 
 
Gen: elderly, chronically ill-appearing. NAD HEENT:  Sclerae nonicteric, hearing intact to voice, mucous membranes moist 
Neck:  Supple, without masses. Resp:  No accessory muscle use, mildly diminished without signficant wheezes, rales, or rhonchi 
Card: HR ~100, reg rhythm, without m/r/g. No LE edema. Abd:  +bowel sounds, soft, NTTP, nondistended. No HSM. Neuro: Face symmetric, tongue midline, speech fluent, follows commands appropriately Psych:  Alert, oriented x 3. Fair insight Medications Reviewed: (see below) Lab Data Reviewed: (see below) 
 
______________________________________________________________________ Medications:  
 
Current Facility-Administered Medications Medication Dose Route Frequency  amLODIPine (NORVASC) tablet 5 mg  5 mg Oral DAILY  remdesivir 100 mg in 0.9% sodium chloride 250 mL IVPB  100 mg IntraVENous Q24H  
 heparin (porcine) injection 5,000 Units  5,000 Units SubCUTAneous Q8H  phenyleph-min oil-petrolatum (PREPARATION H) 0.25-14-74.9 % ointment   PeriANAL QID PRN  
 aspirin chewable tablet 324 mg  324 mg Oral DAILY  sodium chloride (NS) flush 5-40 mL  5-40 mL IntraVENous Q8H  
 sodium chloride (NS) flush 5-40 mL  5-40 mL IntraVENous PRN  
 acetaminophen (TYLENOL) tablet 650 mg  650 mg Oral Q4H PRN  
 naloxone (NARCAN) injection 0.4 mg  0.4 mg IntraVENous PRN  
 ondansetron (ZOFRAN) injection 4 mg  4 mg IntraVENous Q4H PRN  
 morphine injection 2 mg  2 mg IntraVENous Q4H PRN  
 ascorbic acid (vitamin C) (VITAMIN C) tablet 500 mg  500 mg Oral BID  zinc sulfate (ZINCATE) 220 (50) mg capsule 1 Cap  1 Cap Oral DAILY  atorvastatin (LIPITOR) tablet 40 mg  40 mg Oral DAILY  guaiFENesin ER (MUCINEX) tablet 1,200 mg  1,200 mg Oral BID  
  famotidine (PEPCID) tablet 20 mg  20 mg Oral QPM  
  
 
 
 
Lab Review:  
 
Recent Labs  
  06/12/20 
0525 WBC 8.8 HGB 10.6* HCT 33.7*  
 Recent Labs  
  06/12/20 
0554 06/12/20 
0525 06/11/20 
0214 06/10/20 
0402 NA  --  141 140 142 K  --  3.5 3.4* 3.8 CL  --  110* 109* 112* CO2  --  23 21 23 GLU  --  106* 101* 111* BUN  --  28* 26* 24* CREA  --  2.49* 2.44* 2.40* CA  --  7.2* 7.0* 7.4* MG  --  1.2*  --   --   
PHOS  --  2.7 2.8 1.7* ALB  --  2.4* 2.4* 2.5*  2.5* ALT  --  28  --  28 INR 1.3*  --  1.2* 1.1 No components found for: [place] No results for input(s): PH, PCO2, PO2, HCO3, FIO2 in the last 72 hours. Recent Labs  
  06/12/20 
0554 06/11/20 
0214 06/10/20 
0402 INR 1.3* 1.2* 1.1 No results found for: SDES No results found for: CULT 
        
___________________________________________________ Attending Physician: Radha Morrissey MD

## 2020-06-13 NOTE — PROGRESS NOTES
PT is Singaporean speaking. Used blue phone agent numer [id number] agent id for morning assessment. Pt alert and oriented with no s/s of distress. Assisted pt to bedside commode. Bedside alarm set bed locked in low position. Pt told to use the call bell for help - in presence of  via phone. Informed pt of Heparin therapy with use by - Agent [id number]. PT was given the opportunity to ask questions. Notified Dr. Lee Grijalva of elevated HR and nausea and headache. Will continue to monitor. PT is receiving Heparin and only has one IV access. Pt is known to be a difficult IV start. Notified Rapid Response Nurse Kimberley and she is aware of the pt IV status and was told in report that pt may need Midline. Nurse Kimberley is at bedside in order to access the pt. RN Kimberley was able to access IV. SBAR given to Sabi including Heperatin Drip and need time frame to redraw ptt. Also informed that pt is Singaporean speaking. Blue phone at bedside.

## 2020-06-13 NOTE — PROGRESS NOTES
Suresh Hutchinson [facility] 
[address] 
[phone] Medical Progress Note NAME: Cy Luna :  1945 MRM:  [id number] Date/Time: 2020 Assessment / Plan:  
 
Pneumonia due to COVID-19 virus: CXR on  showed worsening infiltrates. Continue vitamin C, zinc, statin. Remdesivir started , day 4 of 5 today. On Anthony pulse per PCCM. Completed azithromycin. Follow d-dimer which is rising. Start IV heparin gtt given high risk of PE. Check LE duplex 
  
Acute respiratory failure with hypoxia: due to COVID-19. Supplemental O2 PRN, wean as tolerated. Monitor closely 
  
LESLEY (acute kidney injury): presumed however baseline renal function unknown, suspect at least CKD3. Obtain records from [facility] if able. Cr worse today, follow closely 
  
HTN (hypertension), benign: BP controlled. Continue Norvasc, IV hydralazine PRN 
  
Total time spent:  35 minutes, d/w PCCM Time spent in the care of this patient including reviewing records, discussing with nursing and/or other providers on the treatment team, obtaining history and examining the patient, and discussing treatment plans. Care Plan discussed with: Patient, Nursing Staff and >50% of time spent in counseling and coordination of care Discussed:  Care Plan and D/C Planning Prophylaxis:  Hep SQ Disposition:  Home w/Family Subjective: Chief Complaint:  [facility] Chart/notes/labs/studies reviewed, patient examined. Feels dyspneic. No fevers. No CP. Objective:  
 
 
Vitals:  
 
  
Last 24hrs VS reviewed since prior progress note. Most recent are: 
 
Visit Vitals /71 (BP 1 Location: Right arm, BP Patient Position: At rest) Pulse (!) 116 Temp 98.2 °F (36.8 °C) Resp 18 Ht 4' 11\" (1.499 m) Wt 78 kg (171 lb 15.3 oz) SpO2 91% BMI 34.73 kg/m² SpO2 Readings from Last 6 Encounters:  
20 91% O2 Flow Rate (L/min): 2 l/min Intake/Output Summary (Last 24 hours) at 6/13/2020 1722 Last data filed at 6/13/2020 6762 Gross per 24 hour Intake  Output 400 ml Net -400 ml Exam:  
 
Physical Exam: 
 
Gen: elderly, chronically ill-appearing. NAD HEENT:  Sclerae nonicteric, hearing intact to voice, mucous membranes moist 
Neck:  Supple, without masses. Resp:  No accessory muscle use, diminished without signficant wheezes, rales, or rhonchi 
Card: tachycardic, reg rhythm, without m/r/g. No LE edema. Abd:  +bowel sounds, soft, NTTP, nondistended. No HSM. Neuro: Face symmetric, tongue midline, speech fluent, follows commands appropriately Psych:  Alert, oriented x 3. Fair insight Medications Reviewed: (see below) Lab Data Reviewed: (see below) 
 
______________________________________________________________________ Medications:  
 
Current Facility-Administered Medications Medication Dose Route Frequency  heparin 25,000 units in D5W 250 ml infusion  18-36 Units/kg/hr IntraVENous TITRATE  amLODIPine (NORVASC) tablet 5 mg  5 mg Oral DAILY  remdesivir 100 mg in 0.9% sodium chloride 250 mL IVPB  100 mg IntraVENous Q24H  phenyleph-min oil-petrolatum (PREPARATION H) 0.25-14-74.9 % ointment   PeriANAL QID PRN  
 aspirin chewable tablet 324 mg  324 mg Oral DAILY  sodium chloride (NS) flush 5-40 mL  5-40 mL IntraVENous Q8H  
 sodium chloride (NS) flush 5-40 mL  5-40 mL IntraVENous PRN  
 acetaminophen (TYLENOL) tablet 650 mg  650 mg Oral Q4H PRN  
 naloxone (NARCAN) injection 0.4 mg  0.4 mg IntraVENous PRN  
 ondansetron (ZOFRAN) injection 4 mg  4 mg IntraVENous Q4H PRN  
 morphine injection 2 mg  2 mg IntraVENous Q4H PRN  
 ascorbic acid (vitamin C) (VITAMIN C) tablet 500 mg  500 mg Oral BID  zinc sulfate (ZINCATE) 220 (50) mg capsule 1 Cap  1 Cap Oral DAILY  atorvastatin (LIPITOR) tablet 40 mg  40 mg Oral DAILY  guaiFENesin ER (MUCINEX) tablet 1,200 mg  1,200 mg Oral BID  famotidine (PEPCID) tablet 20 mg  20 mg Oral QPM  
  
 
 
 
Lab Review:  
 
Recent Labs  
  06/13/20 0440 06/12/20 
0525 WBC 9.5 8.8 HGB 10.1* 10.6* HCT 32.0* 33.7*  
 279 Recent Labs  
  06/13/20 0440 06/12/20 
0554 06/12/20 
0525 06/11/20 
0214   --  141 140  
K 3.8  --  3.5 3.4*  
*  --  110* 109* CO2 23  --  23 21 *  --  106* 101* BUN 31*  --  28* 26* CREA 2.79*  --  2.49* 2.44* CA 7.6*  --  7.2* 7.0*  
MG 1.8  --  1.2*  --   
PHOS 2.3*  --  2.7 2.8 ALB 2.4*  --  2.4* 2.4* ALT 29  --  28  --   
INR 1.3* 1.3*  --  1.2* No components found for: [address] [facility] No results for input(s): PH, PCO2, PO2, HCO3, FIO2 in the last 72 hours. Recent Labs  
  06/13/20 0440 06/12/20 
0554 06/11/20 
0214 INR 1.3* 1.3* 1.2* No results found for: SDES No results found for: CULT 
        
___________________________________________________ Attending Physician: Mary Carlisle MD

## 2020-06-13 NOTE — PROGRESS NOTES
[facility] [place] Pulmonary, Critical Care, and Sleep Medicine Progress Note Name: Elizabeth Nicholson MRN: [id number] : 1945 Hospital: [address] [facility] Date: 2020 IMPRESSION:  
· COVID-19+ · Elevated troponin · LESLEY, ? CKD · HTN  
  
RECOMMENDATIONS:  
· Supplemental O2 as needed to keep sats > 88% · Completed azithro for 5 days. · Continue Anthony pulse · Hold lasix today · Trend inflammatory markers - continuing to trend up · Continue remdesivir for 5 days (day 4/5). Trend LFTs. · Continue vitamin C, zinc, lipitor · D-dimer trending up, agree with dopplers and heparin drip · IS  
· Mucinex · K repleted · Pepcid Subjective:  
 
Ms. Sandra Andersen is a 73yo female w/ history of HTN who presented to the ER on  w/ complaints of shortness of breath, weakness and chest pain. Sats 93% on RA. Has family members that are COVID+. Home med list includes 10mg prednisone that she has apparently been taking since April for \"pain. \" 
 
 Interval history Afebrile Sats 91% on 2L More tachycardic and tachypnic today ECHO: EF 81-88%; grade 1 diastolic dysfunction ROS: Patient with continued cough, mostly nonproductive, and BE. Denies fever or chills. Denies CP. Feels more fatigued today. Past Medical History:  
Diagnosis Date  
 HTN (hypertension), benign No past surgical history on file. Prior to Admission medications Medication Sig Start Date End Date Taking? Authorizing Provider  
lisinopriL (PRINIVIL, ZESTRIL) 5 mg tablet Take 5 mg by mouth daily. Yes Provider, Historical  
predniSONE (DELTASONE) 5 mg tablet Take 10 mg by mouth daily. 20 Yes Provider, Historical  
 
No Known Allergies Social History Tobacco Use  Smoking status: Never Smoker  Smokeless tobacco: Never Used Substance Use Topics  Alcohol use: Never Frequency: Never Family History Problem Relation Age of Onset  Diabetes Neg Hx Current Facility-Administered Medications Medication Dose Route Frequency  heparin 25,000 units in D5W 250 ml infusion  18-36 Units/kg/hr IntraVENous TITRATE  amLODIPine (NORVASC) tablet 5 mg  5 mg Oral DAILY  remdesivir 100 mg in 0.9% sodium chloride 250 mL IVPB  100 mg IntraVENous Q24H  
 aspirin chewable tablet 324 mg  324 mg Oral DAILY  sodium chloride (NS) flush 5-40 mL  5-40 mL IntraVENous Q8H  
 ascorbic acid (vitamin C) (VITAMIN C) tablet 500 mg  500 mg Oral BID  zinc sulfate (ZINCATE) 220 (50) mg capsule 1 Cap  1 Cap Oral DAILY  atorvastatin (LIPITOR) tablet 40 mg  40 mg Oral DAILY  guaiFENesin ER (MUCINEX) tablet 1,200 mg  1,200 mg Oral BID  famotidine (PEPCID) tablet 20 mg  20 mg Oral QPM  
 
 
Review of Systems: A comprehensive review of systems was negative except for that written in the HPI. Objective:  
Vital Signs:   
Visit Vitals /71 (BP 1 Location: Right arm, BP Patient Position: At rest) Pulse (!) 109 Temp 98.2 °F (36.8 °C) Resp 18 Ht 4' 11\" (1.499 m) Wt 78 kg (171 lb 15.3 oz) SpO2 91% BMI 34.73 kg/m² O2 Device: Nasal cannula O2 Flow Rate (L/min): 2 l/min Temp (24hrs), Av.5 °F (36.9 °C), Min:98.1 °F (36.7 °C), Max:99.1 °F (37.3 °C) Intake/Output:  
Last shift:       07 - 1900 In: -  
Out: 250 [Urine:250] Last 3 shifts: 1901 -  0700 In: 365 [P.O.:365] Out: 475 [Urine:475] Intake/Output Summary (Last 24 hours) at 2020 1308 Last data filed at 2020 9435 Gross per 24 hour Intake  Output 550 ml Net -550 ml Physical Exam:  
General:  Alert, cooperative, no acute distress but uncomfortable appearing, appears stated age. Appears fatigued. Head:  NCAT Eyes:  EOMI, conjunctive clear Nose: Nares normal, NC in place Throat: MMM Neck:  Supple, FROM, trachea midline Lungs:   Decreased breath sounds, crackles in the bases.   Mild increase WOB with activity/talking. Chest wall:  Normal shape, nontender Heart:  Tachycardic but regular Abdomen:   Obese, soft, NT Extremities: No c/c/e Pulses: 2+ Skin: CDI, no rash Lymph nodes: No cervical, supraclavicular lymphadenopathy Neurologic: No focal findings, following commands Data review:  
 
Recent Results (from the past 24 hour(s)) PROTHROMBIN TIME + INR Collection Time: 06/13/20  4:40 AM  
Result Value Ref Range INR 1.3 (H) 0.9 - 1.1 Prothrombin time 13.1 (H) 9.0 - 11.1 sec PTT Collection Time: 06/13/20  4:40 AM  
Result Value Ref Range aPTT 47.8 (H) 22.1 - 32.0 sec  
 aPTT, therapeutic range     58.0 - 77.0 SECS FIBRINOGEN Collection Time: 06/13/20  4:40 AM  
Result Value Ref Range Fibrinogen 570 (H) 200 - 475 mg/dL D DIMER Collection Time: 06/13/20  4:40 AM  
Result Value Ref Range D-dimer 2.36 (H) 0.00 - 0.65 mg/L FEU FERRITIN Collection Time: 06/13/20  4:40 AM  
Result Value Ref Range Ferritin 283 (H) 8 - 252 NG/ML  
CARBOXY HEMOGLOBIN Collection Time: 06/13/20  4:40 AM  
Result Value Ref Range Carboxy-Hgb 1.2 1 - 2 % Methemoglobin 0.3 0 - 1.4 %  
 tHb 11.6 (L) 14 - 17 g/dL Oxyhemoglobin 70 (LL) 94 - 97 % O2 SATURATION 72 (L) 95 - 99 % CBC WITH AUTOMATED DIFF Collection Time: 06/13/20  4:40 AM  
Result Value Ref Range WBC 9.5 3.6 - 11.0 K/uL  
 RBC 3.48 (L) 3.80 - 5.20 M/uL  
 HGB 10.1 (L) 11.5 - 16.0 g/dL HCT 32.0 (L) 35.0 - 47.0 % MCV 92.0 80.0 - 99.0 FL  
 MCH 29.0 26.0 - 34.0 PG  
 MCHC 31.6 30.0 - 36.5 g/dL  
 RDW 13.6 11.5 - 14.5 % PLATELET 093 683 - 143 K/uL MPV 9.9 8.9 - 12.9 FL  
 NRBC 0.0 0  WBC ABSOLUTE NRBC 0.00 0.00 - 0.01 K/uL NEUTROPHILS 68 32 - 75 % LYMPHOCYTES 17 12 - 49 % MONOCYTES 11 5 - 13 % EOSINOPHILS 2 0 - 7 % BASOPHILS 0 0 - 1 % IMMATURE GRANULOCYTES 2 (H) 0.0 - 0.5 % ABS. NEUTROPHILS 6.5 1.8 - 8.0 K/UL  
 ABS. LYMPHOCYTES 1.6 0.8 - 3.5 K/UL ABS. MONOCYTES 1.0 0.0 - 1.0 K/UL  
 ABS. EOSINOPHILS 0.2 0.0 - 0.4 K/UL  
 ABS. BASOPHILS 0.0 0.0 - 0.1 K/UL  
 ABS. IMM. GRANS. 0.1 (H) 0.00 - 0.04 K/UL  
 DF AUTOMATED METABOLIC PANEL, COMPREHENSIVE Collection Time: 06/13/20  4:40 AM  
Result Value Ref Range Sodium 140 136 - 145 mmol/L Potassium 3.8 3.5 - 5.1 mmol/L Chloride 110 (H) 97 - 108 mmol/L  
 CO2 23 21 - 32 mmol/L Anion gap 7 5 - 15 mmol/L Glucose 134 (H) 65 - 100 mg/dL BUN 31 (H) 6 - 20 MG/DL Creatinine 2.79 (H) 0.55 - 1.02 MG/DL  
 BUN/Creatinine ratio 11 (L) 12 - 20 GFR est AA 20 (L) >60 ml/min/1.73m2 GFR est non-AA 17 (L) >60 ml/min/1.73m2 Calcium 7.6 (L) 8.5 - 10.1 MG/DL Bilirubin, total 0.6 0.2 - 1.0 MG/DL  
 ALT (SGPT) 29 12 - 78 U/L  
 AST (SGOT) 58 (H) 15 - 37 U/L Alk. phosphatase 96 45 - 117 U/L Protein, total 6.6 6.4 - 8.2 g/dL Albumin 2.4 (L) 3.5 - 5.0 g/dL Globulin 4.2 (H) 2.0 - 4.0 g/dL A-G Ratio 0.6 (L) 1.1 - 2.2 PHOSPHORUS Collection Time: 06/13/20  4:40 AM  
Result Value Ref Range Phosphorus 2.3 (L) 2.6 - 4.7 MG/DL MAGNESIUM Collection Time: 06/13/20  4:40 AM  
Result Value Ref Range Magnesium 1.8 1.6 - 2.4 mg/dL C REACTIVE PROTEIN, QT Collection Time: 06/13/20  4:40 AM  
Result Value Ref Range C-Reactive protein 19.70 (H) 0.00 - 0.60 mg/dL LD Collection Time: 06/13/20  4:40 AM  
Result Value Ref Range  (H) 81 - 246 U/L  
SAMPLES BEING HELD Collection Time: 06/13/20  4:40 AM  
Result Value Ref Range SAMPLES BEING HELD 1BLU,1SST   
 COMMENT Add-on orders for these samples will be processed based on acceptable specimen integrity and analyte stability, which may vary by analyte. Imaging: 
I have personally reviewed the patients radiographs and have reviewed the reports: 
CXR (6/8/2020): There are diffuse patchy bilateral infiltrates, increased in density as compared to the prior study. CXR (6/12/2020): There is patchy bilateral upper lobe airspace disease that has increased as compared to the prior study.   
 
  
Rodríguez Wright MD

## 2020-06-13 NOTE — PROGRESS NOTES
0900-Chart accessed for review due to elevated MEWS 3. , temp 99.1. This patient with COVID-19/pneumonia on Anthony at 2LPM. Will monitor.  Brigid BARRY

## 2020-06-13 NOTE — PROGRESS NOTES
Changed Anthony Cartridge @ 3550 Sats 94% on 2lnc. HR 98 Cannula Lot [id number] Cartridge Lot [id number] Cartridge ID [id number]

## 2020-06-14 NOTE — PROGRESS NOTES
[facility] [place] Pulmonary, Critical Care, and Sleep Medicine Progress Note Name: Shelby Hernandez MRN: [id number] : 1945 Hospital: [address] [facility] Date: 2020 IMPRESSION:  
· COVID-19+ · Elevated troponin · LESLEY, ? CKD · HTN  
  
RECOMMENDATIONS:  
· Supplemental O2 as needed to keep sats > 88% · Completed azithro for 5 days. · Continue Anthony pulse · Hold lasix today · Trend inflammatory markers - continuing to trend up · Continue remdesivir for 5 days (day 4/5). Trend LFTs. · Continue vitamin C, zinc, lipitor · Heparin drip · IS  
· Mucinex · K repleted · Pepcid Subjective:  
 
Ms. Paloma Bedoya is a 73yo female w/ history of HTN who presented to the ER on  w/ complaints of shortness of breath, weakness and chest pain. Sats 93% on RA. Has family members that are COVID+. Home med list includes 10mg prednisone that she has apparently been taking since April for \"pain. \" 
 
 Interval history Afebrile Sats 91% on 2L Breathing is more comfortable today, but still somewhat tachycardic LE dopplers without obvious DVT, but cannot be ruled out on the left ECHO: EF 34-11%; grade 1 diastolic dysfunction ROS: Patient with continued cough, occasional sputum production. She has minimal interest in speaking with me today, but states her breathing is ok. No pain. Past Medical History:  
Diagnosis Date  
 HTN (hypertension), benign No past surgical history on file. Prior to Admission medications Medication Sig Start Date End Date Taking? Authorizing Provider  
lisinopriL (PRINIVIL, ZESTRIL) 5 mg tablet Take 5 mg by mouth daily. Yes Provider, Historical  
predniSONE (DELTASONE) 5 mg tablet Take 10 mg by mouth daily. 20 Yes Provider, Historical  
 
No Known Allergies Social History Tobacco Use  Smoking status: Never Smoker  Smokeless tobacco: Never Used Substance Use Topics  Alcohol use: Never Frequency: Never Family History Problem Relation Age of Onset  Diabetes Neg Hx Current Facility-Administered Medications Medication Dose Route Frequency  heparin 25,000 units in D5W 250 ml infusion  18-36 Units/kg/hr IntraVENous TITRATE  amLODIPine (NORVASC) tablet 5 mg  5 mg Oral DAILY  aspirin chewable tablet 324 mg  324 mg Oral DAILY  sodium chloride (NS) flush 5-40 mL  5-40 mL IntraVENous Q8H  
 ascorbic acid (vitamin C) (VITAMIN C) tablet 500 mg  500 mg Oral BID  zinc sulfate (ZINCATE) 220 (50) mg capsule 1 Cap  1 Cap Oral DAILY  atorvastatin (LIPITOR) tablet 40 mg  40 mg Oral DAILY  guaiFENesin ER (MUCINEX) tablet 1,200 mg  1,200 mg Oral BID  famotidine (PEPCID) tablet 20 mg  20 mg Oral QPM  
 
 
Review of Systems: A comprehensive review of systems was negative except for that written in the HPI. Objective:  
Vital Signs:   
Visit Vitals /79 Pulse (!) 106 Temp 98.4 °F (36.9 °C) Resp 19 Ht 4' 11\" (1.499 m) Wt 78 kg (171 lb 15.3 oz) SpO2 91% BMI 34.73 kg/m² O2 Device: Nasal cannula O2 Flow Rate (L/min): 2 l/min Temp (24hrs), Av.2 °F (36.8 °C), Min:97.7 °F (36.5 °C), Max:98.6 °F (37 °C) Intake/Output:  
Last shift:       07 - 1900 In: -  
Out: 200 [Urine:200] Last 3 shifts: 1901 -  0700 In: -  
Out: 400 [Urine:400] Intake/Output Summary (Last 24 hours) at 2020 1158 Last data filed at 2020 7984 Gross per 24 hour Intake  Output 200 ml Net -200 ml Physical Exam:  
General:  Alert, cooperative, no acute distress but uncomfortable appearing, appears stated age. Appears fatigued. Head:  NCAT Eyes:  EOMI, conjunctive clear Nose: Nares normal, NC in place Throat: MMM Neck:  Supple, FROM, trachea midline Lungs:   Decreased breath sounds, crackles in the bases. Mild increase WOB with activity/talking. Chest wall:  Normal shape, nontender Heart:  Tachycardic but regular Abdomen:   Obese, soft, NT Extremities: No c/c/e Pulses: 2+ Skin: CDI, no rash Lymph nodes: No cervical, supraclavicular lymphadenopathy Neurologic: No focal findings, following commands Data review:  
 
Recent Results (from the past 24 hour(s)) PTT Collection Time: 06/13/20 12:56 PM  
Result Value Ref Range aPTT 49.8 (H) 22.1 - 32.0 sec  
 aPTT, therapeutic range     58.0 - 77.0 SECS  
SAMPLES BEING HELD Collection Time: 06/13/20 12:56 PM  
Result Value Ref Range SAMPLES BEING HELD 1LAV   
 COMMENT Add-on orders for these samples will be processed based on acceptable specimen integrity and analyte stability, which may vary by analyte. PTT Collection Time: 06/13/20  8:38 PM  
Result Value Ref Range aPTT 127.9 (HH) 22.1 - 32.0 sec  
 aPTT, therapeutic range     58.0 - 77.0 SECS  
PROTHROMBIN TIME + INR Collection Time: 06/14/20  1:05 AM  
Result Value Ref Range INR 1.3 (H) 0.9 - 1.1 Prothrombin time 13.5 (H) 9.0 - 11.1 sec PTT Collection Time: 06/14/20  1:05 AM  
Result Value Ref Range aPTT 77.8 (H) 22.1 - 32.0 sec  
 aPTT, therapeutic range     58.0 - 77.0 SECS FIBRINOGEN Collection Time: 06/14/20  1:05 AM  
Result Value Ref Range Fibrinogen 567 (H) 200 - 475 mg/dL D DIMER Collection Time: 06/14/20  1:05 AM  
Result Value Ref Range D-dimer 1.68 (H) 0.00 - 0.65 mg/L FEU  
C REACTIVE PROTEIN, QT Collection Time: 06/14/20  1:05 AM  
Result Value Ref Range C-Reactive protein 20.20 (H) 0.00 - 0.60 mg/dL LD Collection Time: 06/14/20  1:05 AM  
Result Value Ref Range  (H) 81 - 246 U/L FERRITIN Collection Time: 06/14/20  1:05 AM  
Result Value Ref Range Ferritin 340 (H) 8 - 252 NG/ML  
CBC WITH AUTOMATED DIFF Collection Time: 06/14/20  1:05 AM  
Result Value Ref Range WBC 11.2 (H) 3.6 - 11.0 K/uL  
 RBC 3.53 (L) 3.80 - 5.20 M/uL  
 HGB 10.5 (L) 11.5 - 16.0 g/dL HCT 32.4 (L) 35.0 - 47.0 % MCV 91.8 80.0 - 99.0 FL  
 MCH 29.7 26.0 - 34.0 PG  
 MCHC 32.4 30.0 - 36.5 g/dL  
 RDW 13.8 11.5 - 14.5 % PLATELET 376 149 - 065 K/uL MPV 9.9 8.9 - 12.9 FL  
 NRBC 0.0 0  WBC ABSOLUTE NRBC 0.00 0.00 - 0.01 K/uL NEUTROPHILS 66 32 - 75 % LYMPHOCYTES 20 12 - 49 % MONOCYTES 11 5 - 13 % EOSINOPHILS 2 0 - 7 % BASOPHILS 0 0 - 1 % IMMATURE GRANULOCYTES 1 (H) 0.0 - 0.5 % ABS. NEUTROPHILS 7.4 1.8 - 8.0 K/UL  
 ABS. LYMPHOCYTES 2.2 0.8 - 3.5 K/UL  
 ABS. MONOCYTES 1.2 (H) 0.0 - 1.0 K/UL  
 ABS. EOSINOPHILS 0.2 0.0 - 0.4 K/UL  
 ABS. BASOPHILS 0.0 0.0 - 0.1 K/UL  
 ABS. IMM. GRANS. 0.2 (H) 0.00 - 0.04 K/UL  
 DF AUTOMATED METABOLIC PANEL, COMPREHENSIVE Collection Time: 06/14/20  1:05 AM  
Result Value Ref Range Sodium 138 136 - 145 mmol/L Potassium 4.3 3.5 - 5.1 mmol/L Chloride 108 97 - 108 mmol/L  
 CO2 23 21 - 32 mmol/L Anion gap 7 5 - 15 mmol/L Glucose 211 (H) 65 - 100 mg/dL BUN 37 (H) 6 - 20 MG/DL Creatinine 3.07 (H) 0.55 - 1.02 MG/DL  
 BUN/Creatinine ratio 12 12 - 20 GFR est AA 18 (L) >60 ml/min/1.73m2 GFR est non-AA 15 (L) >60 ml/min/1.73m2 Calcium 7.6 (L) 8.5 - 10.1 MG/DL Bilirubin, total 0.6 0.2 - 1.0 MG/DL  
 ALT (SGPT) 29 12 - 78 U/L  
 AST (SGOT) 62 (H) 15 - 37 U/L Alk. phosphatase 113 45 - 117 U/L Protein, total 6.0 (L) 6.4 - 8.2 g/dL Albumin 2.5 (L) 3.5 - 5.0 g/dL Globulin 3.5 2.0 - 4.0 g/dL A-G Ratio 0.7 (L) 1.1 - 2.2 MAGNESIUM Collection Time: 06/14/20  1:05 AM  
Result Value Ref Range Magnesium 1.8 1.6 - 2.4 mg/dL PHOSPHORUS Collection Time: 06/14/20  1:05 AM  
Result Value Ref Range Phosphorus 2.7 2.6 - 4.7 MG/DL  
NT-PRO BNP Collection Time: 06/14/20  1:05 AM  
Result Value Ref Range NT pro-BNP 1,267 (H) <450 PG/ML  
SAMPLES BEING HELD Collection Time: 06/14/20  1:05 AM  
Result Value Ref Range SAMPLES BEING HELD 1BLU   
 COMMENT Add-on orders for these samples will be processed based on acceptable specimen integrity and analyte stability, which may vary by analyte. PTT Collection Time: 06/14/20  7:58 AM  
Result Value Ref Range aPTT >130.0 (HH) 22.1 - 32.0 sec  
 aPTT, therapeutic range     58.0 - 77.0 SECS  
BLOOD GAS, ARTERIAL Collection Time: 06/14/20  8:00 AM  
Result Value Ref Range pH 7.35 7.35 - 7.45    
 PCO2 37 35.0 - 45.0 mmHg PO2 62 (L) 80 - 100 mmHg O2 SAT 91 (L) 92 - 97 % BICARBONATE 20 (L) 22 - 26 mmol/L  
 BASE DEFICIT 5.2 mmol/L  
 O2 METHOD NASAL O2    
 O2 FLOW RATE 4 L/min Sample source ARTERIAL    
 SITE RIGHT RADIAL PETER'S TEST YES    
PTT Collection Time: 06/14/20 10:51 AM  
Result Value Ref Range aPTT 62.6 (H) 22.1 - 32.0 sec  
 aPTT, therapeutic range     58.0 - 77.0 SECS Imaging: 
I have personally reviewed the patients radiographs and have reviewed the reports: 
CXR (6/8/2020): There are diffuse patchy bilateral infiltrates, increased in density as compared to the prior study. CXR (6/12/2020): There is patchy bilateral upper lobe airspace disease that has increased as compared to the prior study.   
 
  
Jaqui Lamar MD

## 2020-06-14 NOTE — PROGRESS NOTES
Suresh Hutchinson [facility] [place] [facility] 
[address] [facility], [address] [place] [address] 
[phone] Medical Progress Note NAME: Amilcar Estimable :  1945 MRM:  [id number] Date/Time: 2020 Assessment / Plan:  
 
Pneumonia due to COVID-19 virus: CXR on  showed worsening infiltrates. Continue vitamin C, zinc, statin. Completed azithromycin and Remdesivir. Follow d-dimer which is rising. Started IV heparin gtt yesterday, high risk of PE. BLE duplex negative for DVT 
  
Acute respiratory failure with hypoxia: due to COVID-19. Supplemental O2 PRN, wean as tolerated. Monitor O2 requirement closely 
  
LESLEY (acute kidney injury): presumed however baseline renal function unknown, suspect at least CKD3. Obtain records from [facility]. Worsening. Monitor BMP closely. Nephrology following 
  
HTN (hypertension), benign: BP controlled. Continue Norvasc, IV hydralazine PRN 
  
 
Total time spent:  35 minutes, d/w PCCM Time spent in the care of this patient including reviewing records, discussing with nursing and/or other providers on the treatment team, obtaining history and examining the patient, and discussing treatment plans. Care Plan discussed with: Patient, Nursing Staff and >50% of time spent in counseling and coordination of care Discussed:  Care Plan and D/C Planning Prophylaxis:  Heparin IV Disposition:  Home w/Family Subjective: Chief Complaint:  Follow up Moo Kim Chart/notes/labs/studies reviewed, patient examined. Feels a bit better. No bleeding. No fevers Objective:  
 
 
Vitals:  
 
  
Last 24hrs VS reviewed since prior progress note. Most recent are: 
 
Visit Vitals /74 (BP 1 Location: Left arm, BP Patient Position: At rest) Pulse (!) 109 Temp 98.2 °F (36.8 °C) Resp 22 Ht 4' 11\" (1.499 m) Wt 78 kg (171 lb 15.3 oz) SpO2 91% BMI 34.73 kg/m² SpO2 Readings from Last 6 Encounters:  
20 91% O2 Flow Rate (L/min): 2 l/min Intake/Output Summary (Last 24 hours) at 6/14/2020 1700 Last data filed at 6/14/2020 7955 Gross per 24 hour Intake  Output 200 ml Net -200 ml Exam:  
 
Physical Exam: 
 
Gen: elderly, chronically ill-appearing. NAD HEENT:  Sclerae nonicteric, hearing intact to voice, mucous membranes moist 
Neck:  Supple, without masses. Resp:  No accessory muscle use, diminished with few rhonchi. No wheezing or rales Card: tachycardic, reg rhythm, without m/r/g. No LE edema. Abd:  +bowel sounds, soft, NTTP, nondistended. No HSM. Neuro: Face symmetric, tongue midline, speech fluent, follows commands appropriately Psych:  Alert, oriented x 3. Fair insight Medications Reviewed: (see below) Lab Data Reviewed: (see below) 
 
______________________________________________________________________ Medications:  
 
Current Facility-Administered Medications Medication Dose Route Frequency  heparin 25,000 units in D5W 250 ml infusion  18-36 Units/kg/hr IntraVENous TITRATE  amLODIPine (NORVASC) tablet 5 mg  5 mg Oral DAILY  phenyleph-min oil-petrolatum (PREPARATION H) 0.25-14-74.9 % ointment   PeriANAL QID PRN  
 aspirin chewable tablet 324 mg  324 mg Oral DAILY  sodium chloride (NS) flush 5-40 mL  5-40 mL IntraVENous Q8H  
 sodium chloride (NS) flush 5-40 mL  5-40 mL IntraVENous PRN  
 acetaminophen (TYLENOL) tablet 650 mg  650 mg Oral Q4H PRN  
 naloxone (NARCAN) injection 0.4 mg  0.4 mg IntraVENous PRN  
 ondansetron (ZOFRAN) injection 4 mg  4 mg IntraVENous Q4H PRN  
 morphine injection 2 mg  2 mg IntraVENous Q4H PRN  
 ascorbic acid (vitamin C) (VITAMIN C) tablet 500 mg  500 mg Oral BID  zinc sulfate (ZINCATE) 220 (50) mg capsule 1 Cap  1 Cap Oral DAILY  atorvastatin (LIPITOR) tablet 40 mg  40 mg Oral DAILY  guaiFENesin ER (MUCINEX) tablet 1,200 mg  1,200 mg Oral BID  famotidine (PEPCID) tablet 20 mg  20 mg Oral QPM  
  
 
 
 
 Lab Review:  
 
Recent Labs  
  06/14/20 0105 06/13/20 0440 06/12/20 
0525 WBC 11.2* 9.5 8.8 HGB 10.5* 10.1* 10.6* HCT 32.4* 32.0* 33.7*  
 326 279 Recent Labs  
  06/14/20 0105 06/13/20 0440 06/12/20 
0554 06/12/20 
1932  140  --  141  
K 4.3 3.8  --  3.5  110*  --  110* CO2 23 23  --  23  
* 134*  --  106* BUN 37* 31*  --  28* CREA 3.07* 2.79*  --  2.49* CA 7.6* 7.6*  --  7.2*  
MG 1.8 1.8  --  1.2*  
PHOS 2.7 2.3*  --  2.7 ALB 2.5* 2.4*  --  2.4* ALT 29 29  --  28 INR 1.3* 1.3* 1.3*  -- No components found for: [place] Recent Labs  
  06/14/20 
0800 PH 7.35  
PCO2 37 PO2 62* HCO3 20* Recent Labs  
  06/14/20 0105 06/13/20 0440 06/12/20 
0554 INR 1.3* 1.3* 1.3* No results found for: SDES No results found for: CULT 
        
___________________________________________________ Attending Physician: Bijan Nunez MD

## 2020-06-14 NOTE — PROGRESS NOTES
56 - Asked by primary RN to assist with obtaining labs for patient. Patient now on heparin gtt and supratherapeutic on INR, patient very distressed over having to be stuck again because her arms are bruised and she has had such frequent lab draws and IV starts. Used the [email or web address] phone to speak with patient and explained the reason that she is on a heparin gtt and the reason that we have to monitor it closely. Patient tearful and also voices concerns that she is getting weaker and weaker and is not getting better. Discussed with patient that I noticed when I have been coming in that she is not eating her dinner trays, patient states that she does not have an appetite and doesn't want to eat. Patient willing to try some supplements, advised primary RN to try and pass on to have them ordered for her and also advised her to get daily weights on patients since her nutrition has been so poor. 0100 - Dr. Guera Wilson at bedside, primary RN at bedside, discussed that patient has been having difficulty with IV sticks and labs and has extensive bruising to her arms and is complaining that they are becoming increasingly painful. Asked if anesthesia could be contacted to see if patient could potentially get a midline since as it stands patient needs aPTTs every 2 hours 56 - Spoke with primary RN, patient to have triple lumen IJ placed.

## 2020-06-14 NOTE — PROGRESS NOTES
Morning Assessment with Agent  [id number]. PT states that she feels weak . Pt denies difficulty breathing this morning. PT given the opportunity to ask question. No questions asked by patient. Pt states that she has decreased appetite. [address], Lab Ptt greater than 9119 Gretta Valle Notified MD Persaud. Follow Protocol. Also request nutritional consult as pt reported a decrease in appetite. Reviewed with Reena Keating Heparin dosing. Resume at 12 units/kg/hr.

## 2020-06-14 NOTE — PROGRESS NOTES
Spiritual Care Assessment/Progress Note [address] 
 
 
NAME: Chiquita Mccoy      MRN: [id number] AGE: 67 y.o. SEX: female Nondenominational Affiliation: No preference Language: Faroese 6/14/2020     Total Time (in minutes): 4 Spiritual Assessment begun in [facility] 4M POST SURG ORT 2 through conversation with: 
  
    []Patient        [] Family    [] Friend(s) Reason for Consult: Initial/Spiritual assessment, patient floor Spiritual beliefs: (Please include comment if needed) 
   [] Identifies with a mirella tradition:     
   [] Supported by a mirella community:        
   [] Claims no spiritual orientation:       
   [] Seeking spiritual identity:            
   [] Adheres to an individual form of spirituality:       
   [x] Not able to assess:                   
 
    
Identified resources for coping:  
   [] Prayer                           
   [] Music                  [] Guided Imagery 
   [] Family/friends                 [] Pet visits [] Devotional reading                         [] Unknown 
   [] Other:                              
 
 
Interventions offered during this visit: (See comments for more details) Patient Interventions: Initial visit(Attempted) Plan of Care: 
 
 [] Support spiritual and/or cultural needs  
 [] Support AMD and/or advance care planning process    
 [] Support grieving process 
 [] Coordinate Rites and/or Rituals  
 [] Coordination with community clergy [] No spiritual needs identified at this time 
 [] Detailed Plan of Care below (See Comments)  [] Make referral to Music Therapy 
[] Make referral to Pet Therapy    
[] Make referral to Addiction services 
[] Make referral to [facility] 
[] Make referral to Spiritual Care Partner 
[] No future visits requested       
[x] Follow up visits as needed Comments: Attempted initial spiritual care assessment in 4 Post Surg/Ortho.   Consulted with Miss Romero's RN who was preparing to go into another room. Miss Vickie Garcia speaks only Indonesian, Nurse did not know if she has a hospital phone next to her. Will plan on attempting to coordinate a phone call tomorrow with Cross Cultural services and Miss Romero's Nurse. Chaplains will follow as able and/or needed. Visited by: Artie Owens MS., [address] ([id number])

## 2020-06-14 NOTE — ANESTHESIA PREPROCEDURE EVALUATION
Relevant Problems No relevant active problems Anesthetic History No history of anesthetic complications Review of Systems / Medical History Patient summary reviewed, nursing notes reviewed and pertinent labs reviewed Pulmonary Within defined limits Neuro/Psych Within defined limits Cardiovascular Within defined limits Hypertension GI/Hepatic/Renal 
Within defined limits Endo/Other Within defined limits Other Findings Comments: COVID Anesthetic Plan ASA: 3 Anesthetic plan and risks discussed with: Patient

## 2020-06-14 NOTE — PROGRESS NOTES
Notified by Osiris Barrow, Lab -  .3. Will notify the MD.  
 
Notified MD Juan De Jesus and advised of the PTT. Per MD Juan De Jesus follow protocol. SBAR given to Brian including elevated PTT and notification to doctor with plan to follow protocol.

## 2020-06-14 NOTE — PROGRESS NOTES
Changed Anthony Cartridge [id number]. Sats 94% on 2lnc. Cannula lot [id number] Cartridge lot [id number] Cartridge ID [id number]

## 2020-06-14 NOTE — ANESTHESIA PROCEDURE NOTES
Central Line Placement Start time: 6/14/2020 2:10 AM 
End time: 6/14/2020 2:37 AM 
Performed by: Catrina Taylor MD 
Authorized by: Catrina Taylor MD  
 
Indications: vascular access Preanesthetic Checklist: patient identified, risks and benefits discussed, anesthesia consent, patient being monitored and timeout performed Pre-procedure: All elements of maximal sterile barrier technique followed? Yes   
2% Chlorhexidine for cutaneous antisepsis, Hand hygiene performed prior to catheter insertion and Ultrasound guidance Sterile Ultrasound Technique followed?: Yes Procedure:  
Prep:  Chlorhexidine Location: internal jugular Orientation:  Right Patient position:  Trendelenburg Catheter type:  Triple lumen Catheter size:  7 Fr Catheter length:  20 cm Number of attempts:  1 Successful placement: Yes Assessment:  
Post-procedure:  Catheter secured, sterile dressing applied and sterile dressing with CHG applied Assessment:  Blood return through all ports, free fluid flow, placement verified by x-ray and guidewire removal verified Insertion:  Uncomplicated Patient tolerance:  Patient tolerated the procedure well with no immediate complications A

## 2020-06-15 NOTE — PROGRESS NOTES
Changed Anthony Cartridge @ 1:15 Sats 96% on [facility] Cannula Lot # [id number] Cartridge Lot [id number] Cartridge ID [id number]

## 2020-06-15 NOTE — PROGRESS NOTES
Provided  services to RN Selene Oneal and patient during assessment and update on plan of care. Opportunity for questions and clarification was provided. Yasir Salazar[address] 
[phone]

## 2020-06-15 NOTE — PROGRESS NOTES
[facility], Critical Care, and Sleep Medicine Progress Note Name: Maddie Morales MRN: [id number] : 1945 Hospital: [facility] Date: 6/15/2020 IMPRESSION:  
· COVID-19+ · Elevated troponin · LESLEY, ? CKD · HTN  
  
RECOMMENDATIONS:  
· Supplemental O2 as needed to keep sats > 88% · Completed azithro for 5 days. · Continue Anthony pulse; follow methemoglobin · CXR tomorrow · Renal following and greatly appreciate help: may need RRT · Trend inflammatory markers - continuing to trend up · Continue remdesivir for 5 days (day 4/5). Trend LFTs. · Continue vitamin C, zinc, lipitor · Heparin drip · IS  
· Mucinex · Pepcid Subjective:  
 
Ms. Grupo Parks is a 75yo female w/ history of HTN who presented to the ER on  w/ complaints of shortness of breath, weakness and chest pain. Sats 93% on RA. Has family members that are COVID+. Home med list includes 10mg prednisone that she has apparently been taking since April for \"pain. \" 
 
 Interval history Afebrile Sats 94% on 3L NC Hgb 9.3 Creat 3.58; worse Pro-BNP 1419; worse Ferritin 371; worse C-reactive protein 17.3; better D-dimer 1.25; better LE dopplers without obvious DVT, but cannot be ruled out on the left ECHO: EF 86-49%; grade 1 diastolic dysfunction ROS: Talking on phone and does not want to talk to me today. Does not appear to be in any distress. Past Medical History:  
Diagnosis Date  
 HTN (hypertension), benign No past surgical history on file. Prior to Admission medications Medication Sig Start Date End Date Taking? Authorizing Provider  
lisinopriL (PRINIVIL, ZESTRIL) 5 mg tablet Take 5 mg by mouth daily. Yes Provider, Historical  
predniSONE (DELTASONE) 5 mg tablet Take 10 mg by mouth daily. 20 Yes Provider, Historical  
 
No Known Allergies Social History Tobacco Use  Smoking status: Never Smoker  Smokeless tobacco: Never Used Substance Use Topics  Alcohol use: Never Frequency: Never Family History Problem Relation Age of Onset  Diabetes Neg Hx Current Facility-Administered Medications Medication Dose Route Frequency  heparin 25,000 units in D5W 250 ml infusion  18-36 Units/kg/hr IntraVENous TITRATE  amLODIPine (NORVASC) tablet 5 mg  5 mg Oral DAILY  aspirin chewable tablet 324 mg  324 mg Oral DAILY  sodium chloride (NS) flush 5-40 mL  5-40 mL IntraVENous Q8H  
 ascorbic acid (vitamin C) (VITAMIN C) tablet 500 mg  500 mg Oral BID  zinc sulfate (ZINCATE) 220 (50) mg capsule 1 Cap  1 Cap Oral DAILY  atorvastatin (LIPITOR) tablet 40 mg  40 mg Oral DAILY  guaiFENesin ER (MUCINEX) tablet 1,200 mg  1,200 mg Oral BID  famotidine (PEPCID) tablet 20 mg  20 mg Oral QPM  
 
 
Review of Systems: A comprehensive review of systems was negative except for that written in the HPI. Objective:  
Vital Signs:   
Visit Vitals /75 (BP 1 Location: Left arm, BP Patient Position: At rest) Pulse (!) 103 Temp 97.8 °F (36.6 °C) Resp 24 Ht 4' 11\" (1.499 m) Wt 78 kg (171 lb 15.3 oz) SpO2 94% BMI 34.73 kg/m² O2 Device: Nasal cannula(Nitric) O2 Flow Rate (L/min): 3 l/min Temp (24hrs), Av.2 °F (36.8 °C), Min:97.8 °F (36.6 °C), Max:98.3 °F (36.8 °C) Intake/Output:  
Last shift:      06/15 0701 - 06/15 1900 In: -  
Out: 200 [Urine:200] Last 3 shifts: 1901 - 06/15 07 In: 253.1 [I.V.:253.1] Out: 200 [Urine:200] Intake/Output Summary (Last 24 hours) at 6/15/2020 1435 Last data filed at 6/15/2020 0159 Gross per 24 hour Intake 253.07 ml Output 200 ml Net 53.07 ml Physical Exam:  
General:  Alert, cooperative, no acute distress, appears stated age. Head:  NCAT Eyes:  EOMI, conjunctive clear Nose: Nares normal, NC in place Throat: MMM Neck:  Supple, FROM, trachea midline Lungs:   Deferred Chest wall:  Normal shape, nontender Heart:  Deferred Abdomen:   Obese, soft, NT Extremities: No c/c/e Pulses: 2+ Skin: CDI, no rash Lymph nodes: Deferred Neurologic: No focal findings, following commands Data review:  
 
Recent Results (from the past 24 hour(s)) PTT Collection Time: 06/14/20  6:15 PM  
Result Value Ref Range aPTT 112.3 (HH) 22.1 - 32.0 sec  
 aPTT, therapeutic range     58.0 - 77.0 SECS  
PTT Collection Time: 06/14/20  9:10 PM  
Result Value Ref Range aPTT 103.1 (HH) 22.1 - 32.0 sec  
 aPTT, therapeutic range     58.0 - 77.0 SECS  
PTT Collection Time: 06/14/20 11:30 PM  
Result Value Ref Range aPTT 91.5 (HH) 22.1 - 32.0 sec  
 aPTT, therapeutic range     58.0 - 77.0 SECS  
PROTHROMBIN TIME + INR Collection Time: 06/15/20  1:37 AM  
Result Value Ref Range INR 1.3 (H) 0.9 - 1.1 Prothrombin time 13.2 (H) 9.0 - 11.1 sec PTT Collection Time: 06/15/20  1:37 AM  
Result Value Ref Range aPTT 80.9 (H) 22.1 - 32.0 sec  
 aPTT, therapeutic range     58.0 - 77.0 SECS FIBRINOGEN Collection Time: 06/15/20  1:37 AM  
Result Value Ref Range Fibrinogen 475 200 - 475 mg/dL D DIMER Collection Time: 06/15/20  1:37 AM  
Result Value Ref Range D-dimer 1.25 (H) 0.00 - 0.65 mg/L FEU FERRITIN Collection Time: 06/15/20  1:37 AM  
Result Value Ref Range Ferritin 371 26 - 388 NG/ML  
CBC WITH AUTOMATED DIFF Collection Time: 06/15/20  1:37 AM  
Result Value Ref Range WBC 9.6 3.6 - 11.0 K/uL  
 RBC 3.15 (L) 3.80 - 5.20 M/uL HGB 9.3 (L) 11.5 - 16.0 g/dL HCT 29.2 (L) 35.0 - 47.0 % MCV 92.7 80.0 - 99.0 FL  
 MCH 29.5 26.0 - 34.0 PG  
 MCHC 31.8 30.0 - 36.5 g/dL  
 RDW 14.1 11.5 - 14.5 % PLATELET 214 476 - 775 K/uL MPV 9.7 8.9 - 12.9 FL  
 NRBC 0.2 (H) 0  WBC ABSOLUTE NRBC 0.02 (H) 0.00 - 0.01 K/uL NEUTROPHILS 63 32 - 75 % LYMPHOCYTES 20 12 - 49 % MONOCYTES 14 (H) 5 - 13 % EOSINOPHILS 2 0 - 7 % BASOPHILS 0 0 - 1 % IMMATURE GRANULOCYTES 1 (H) 0.0 - 0.5 % ABS. NEUTROPHILS 6.0 1.8 - 8.0 K/UL  
 ABS. LYMPHOCYTES 1.9 0.8 - 3.5 K/UL  
 ABS. MONOCYTES 1.3 (H) 0.0 - 1.0 K/UL  
 ABS. EOSINOPHILS 0.2 0.0 - 0.4 K/UL  
 ABS. BASOPHILS 0.0 0.0 - 0.1 K/UL  
 ABS. IMM. GRANS. 0.1 (H) 0.00 - 0.04 K/UL  
 DF AUTOMATED METABOLIC PANEL, COMPREHENSIVE Collection Time: 06/15/20  1:37 AM  
Result Value Ref Range Sodium 137 136 - 145 mmol/L Potassium 4.3 3.5 - 5.1 mmol/L Chloride 106 97 - 108 mmol/L  
 CO2 23 21 - 32 mmol/L Anion gap 8 5 - 15 mmol/L Glucose 158 (H) 65 - 100 mg/dL BUN 46 (H) 6 - 20 MG/DL Creatinine 3.58 (H) 0.55 - 1.02 MG/DL  
 BUN/Creatinine ratio 13 12 - 20 GFR est AA 15 (L) >60 ml/min/1.73m2 GFR est non-AA 12 (L) >60 ml/min/1.73m2 Calcium 8.1 (L) 8.5 - 10.1 MG/DL Bilirubin, total 0.5 0.2 - 1.0 MG/DL  
 ALT (SGPT) 28 12 - 78 U/L  
 AST (SGOT) 66 (H) 15 - 37 U/L Alk. phosphatase 137 (H) 45 - 117 U/L Protein, total 5.9 (L) 6.4 - 8.2 g/dL Albumin 2.1 (L) 3.5 - 5.0 g/dL Globulin 3.8 2.0 - 4.0 g/dL A-G Ratio 0.6 (L) 1.1 - 2.2 MAGNESIUM Collection Time: 06/15/20  1:37 AM  
Result Value Ref Range Magnesium 1.9 1.6 - 2.4 mg/dL PHOSPHORUS Collection Time: 06/15/20  1:37 AM  
Result Value Ref Range Phosphorus 3.6 2.6 - 4.7 MG/DL  
C REACTIVE PROTEIN, QT Collection Time: 06/15/20  1:37 AM  
Result Value Ref Range C-Reactive protein 17.30 (H) 0.00 - 0.60 mg/dL LD Collection Time: 06/15/20  1:37 AM  
Result Value Ref Range  (H) 81 - 246 U/L  
NT-PRO BNP Collection Time: 06/15/20  1:37 AM  
Result Value Ref Range NT pro-BNP 1,419 (H) <450 PG/ML  
PTT Collection Time: 06/15/20 10:00 AM  
Result Value Ref Range aPTT 95.6 (HH) 22.1 - 32.0 sec  
 aPTT, therapeutic range     58.0 - 77.0 SECS Imaging: 
I have personally reviewed the patients radiographs and have reviewed the reports: CXR (6/8/2020): There are diffuse patchy bilateral infiltrates, increased in density as compared to the prior study. CXR (6/12/2020): There is patchy bilateral upper lobe airspace disease that has increased as compared to the prior study.   
 
  
Dang Bob, NP

## 2020-06-15 NOTE — PROGRESS NOTES
Morning assessment done with the help of  service -Juliana Martin. Pt states that she is very weak. Pt is concerned regarding the amount of blood drawn for lab work. PT notified that the blood work is important to monitor clotting time and that the blood drawn is minimal.  
 
Patient denies SOB No - Nausea Tingling in her hands and feet present before this admission. Pt states that she is [place]. 1106 -  PTT - 95.6 notified by Kenzie Rivas. Will follow protocol. Contacted lab and received confirmation by [facility] that the blood had been received. ASHA Penaloza present to witness call. SBAR to Viola Penaloza, including Heparin information regarding most recent PTT lab draw. 2047 Notified by charge that results of ptt sent at approx 1800 is not showing. ASHA Mg called the Lab and per  the blood has been received and it is a critical value. Neither ASHA Govea or ASHA Penaloza notified of critcal value and the chart does not show results. ASHA Mg notified ASHA Penaloza of matter.

## 2020-06-15 NOTE — PROGRESS NOTES
Suresh Praterelsen Daniella Hendricks [address] YOB: 1945 Assessment & Plan:  
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 
HTN 
HL 
 
Rec: 
After some initial improvement now worsening Supportive care, watch labs. May need RRT if trend continues Subjective:  
CC: F/u LESLEY 
HPI: Creat rising over weekend. On 3L nc Current Facility-Administered Medications Medication Dose Route Frequency  heparin 25,000 units in D5W 250 ml infusion  18-36 Units/kg/hr IntraVENous TITRATE  amLODIPine (NORVASC) tablet 5 mg  5 mg Oral DAILY  phenyleph-min oil-petrolatum (PREPARATION H) 0.25-14-74.9 % ointment   PeriANAL QID PRN  
 aspirin chewable tablet 324 mg  324 mg Oral DAILY  sodium chloride (NS) flush 5-40 mL  5-40 mL IntraVENous Q8H  
 sodium chloride (NS) flush 5-40 mL  5-40 mL IntraVENous PRN  
 acetaminophen (TYLENOL) tablet 650 mg  650 mg Oral Q4H PRN  
 naloxone (NARCAN) injection 0.4 mg  0.4 mg IntraVENous PRN  
 ondansetron (ZOFRAN) injection 4 mg  4 mg IntraVENous Q4H PRN  
 morphine injection 2 mg  2 mg IntraVENous Q4H PRN  
 ascorbic acid (vitamin C) (VITAMIN C) tablet 500 mg  500 mg Oral BID  zinc sulfate (ZINCATE) 220 (50) mg capsule 1 Cap  1 Cap Oral DAILY  atorvastatin (LIPITOR) tablet 40 mg  40 mg Oral DAILY  guaiFENesin ER (MUCINEX) tablet 1,200 mg  1,200 mg Oral BID  famotidine (PEPCID) tablet 20 mg  20 mg Oral QPM  
  
 
 
Objective:  
 
Vitals: 
Blood pressure 136/74, pulse (!) 104, temperature 98.2 °F (36.8 °C), resp. rate 22, height 4' 11\" (1.499 m), weight 78 kg (171 lb 15.3 oz), SpO2 90 %. Temp (24hrs), Av.3 °F (36.8 °C), Min:98.1 °F (36.7 °C), Max:98.4 °F (36.9 °C) Intake and Output: 
No intake/output data recorded.  1901 - 06/15 0700 In: 253.1 [I.V.:253.1] Out: 200 [Urine:200] Physical Exam:              
IN person exam deferred due to COVID isolation status ECG/rhythm: Data Review No results for input(s): TNIPOC in the last 72 hours. No lab exists for component: ITNL No results for input(s): CPK, CKMB, TROIQ in the last 72 hours. Recent Labs  
  06/15/20 
0137 06/14/20 
0105 06/13/20 
0440  138 140  
K 4.3 4.3 3.8  108 110* CO2 23 23 23 BUN 46* 37* 31* CREA 3.58* 3.07* 2.79* * 211* 134* PHOS 3.6 2.7 2.3*  
MG 1.9 1.8 1.8  
CA 8.1* 7.6* 7.6* ALB 2.1* 2.5* 2.4* WBC 9.6 11.2* 9.5 HGB 9.3* 10.5* 10.1* HCT 29.2* 32.4* 32.0*  
 368 326 Recent Labs  
  06/15/20 
0137 06/14/20 
2330 06/14/20 2110 06/14/20 0105  06/13/20 
0440 INR 1.3*  --   --   --  1.3*  --  1.3* PTP 13.2*  --   --   --  13.5*  --  13.1* APTT 80.9* 91.5* 103.1*   < > 77.8*   < > 47.8*  
 < > = values in this interval not displayed. Needs: urine analysis, urine sodium, protein and creatinine Lab Results Component Value Date/Time Sodium,urine random 80 06/07/2020 09:03 PM  
 Creatinine, urine 59.00 06/07/2020 09:03 PM  
 Creatinine, urine 62.10 06/07/2020 09:03 PM  
 
 
 
 
: Schuyler George MD 
6/15/2020 [facility]: 
[email or web address] [place] office: 
[address], [address] Phone: [phone] Fax :     [phone] [place] office: 
[address] [facility] Phone - [phone] Fax - [phone]

## 2020-06-15 NOTE — PROGRESS NOTES
[facility] 
[address] [facility], [place], [address] [place] [address] 
[phone] Medical Progress Note NAME: Ken Estevez :  1945 MRM:  [id number] Date of service: 6/15/2020  7:56 AM 
 
  
Assessment and Plan: 1. Pneumonia due to COVID-19 virus: CXR on : Unchanged multilobar airspace disease. Continue vitamin C, zinc, statin. Completed azithromycin and Remdesivir. Follow inflammatory markers. d-dimer is rising and started on IV heparin gtt high risk of PE. BLE duplex negative for DVT 
  
2.  Acute respiratory failure with hypoxia: due to COVID-19. Supplemental O2 PRN, wean as tolerated. Monitor O2 requirement closely 
  
3. LESLEY (acute kidney injury) /CKD: Baseline renal function unknown, suspect at least CKD3. Obtain records from [facility]. Worsening. Monitor BMP closely. Nephrology following 
  
4. HTN (hypertension), benign: BP controlled. Continue Norvasc, IV hydralazine PRN Subjective: Chief Complaint[de-identified] Patient was seen and examined as a follow up for COVID 19. Chart was reviewed. c/o cough and SOB 
 
ROS: 
(bold if positive, if negative) Tolerating PT  Tolerating Diet Objective:  
 
Last 24hrs VS reviewed since prior progress note. Most recent are: 
 
Visit Vitals /77 (BP 1 Location: Left arm, BP Patient Position: Post activity) Pulse (!) 110 Temp 98.1 °F (36.7 °C) Resp 22 Ht 4' 11\" (1.499 m) Wt 78 kg (171 lb 15.3 oz) SpO2 91% BMI 34.73 kg/m² SpO2 Readings from Last 6 Encounters:  
06/15/20 91% O2 Flow Rate (L/min): 3 l/min Intake/Output Summary (Last 24 hours) at 6/15/2020 6137 Last data filed at 6/15/2020 0410 Gross per 24 hour Intake 253.07 ml Output 200 ml Net 53.07 ml Physical Exam: 
 
Gen:  Well-developed, well-nourished, in no acute distress HEENT:  Pink conjunctivae, PERRL, hearing intact to voice, moist mucous membranes Neck:  Supple, without masses, thyroid non-tender Resp:  No accessory muscle use, clear breath sounds without wheezes rales or rhonchi 
Card:  No murmurs, normal S1, S2 without thrills, bruits or peripheral edema Abd:  Soft, non-tender, non-distended, normoactive bowel sounds are present, no palpable organomegaly and no detectable hernias Lymph:  No cervical or inguinal adenopathy Musc:  No cyanosis or clubbing Skin:  No rashes or ulcers, skin turgor is good Neuro:  Cranial nerves are grossly intact, no focal motor weakness, follows commands appropriately Psych:  Good insight, oriented to person, place and time, alert 
__________________________________________________________________ Medications Reviewed: (see below) Medications:  
 
Current Facility-Administered Medications Medication Dose Route Frequency  heparin 25,000 units in D5W 250 ml infusion  18-36 Units/kg/hr IntraVENous TITRATE  amLODIPine (NORVASC) tablet 5 mg  5 mg Oral DAILY  phenyleph-min oil-petrolatum (PREPARATION H) 0.25-14-74.9 % ointment   PeriANAL QID PRN  
 aspirin chewable tablet 324 mg  324 mg Oral DAILY  sodium chloride (NS) flush 5-40 mL  5-40 mL IntraVENous Q8H  
 sodium chloride (NS) flush 5-40 mL  5-40 mL IntraVENous PRN  
 acetaminophen (TYLENOL) tablet 650 mg  650 mg Oral Q4H PRN  
 naloxone (NARCAN) injection 0.4 mg  0.4 mg IntraVENous PRN  
 ondansetron (ZOFRAN) injection 4 mg  4 mg IntraVENous Q4H PRN  
 morphine injection 2 mg  2 mg IntraVENous Q4H PRN  
 ascorbic acid (vitamin C) (VITAMIN C) tablet 500 mg  500 mg Oral BID  zinc sulfate (ZINCATE) 220 (50) mg capsule 1 Cap  1 Cap Oral DAILY  atorvastatin (LIPITOR) tablet 40 mg  40 mg Oral DAILY  guaiFENesin ER (MUCINEX) tablet 1,200 mg  1,200 mg Oral BID  famotidine (PEPCID) tablet 20 mg  20 mg Oral QPM  
  
 
Lab Data Reviewed: (see below) Lab Review:  
 
Recent Labs  
  06/15/20 
0137 06/14/20 
0105 06/13/20 
0440 WBC 9.6 11.2* 9.5 HGB 9.3* 10.5* 10.1* HCT 29.2* 32.4* 32.0*  
 368 326 Recent Labs  
  06/15/20 
0137 06/14/20 
0105 06/13/20 
0440  138 140  
K 4.3 4.3 3.8  108 110* CO2 23 23 23 * 211* 134* BUN 46* 37* 31* CREA 3.58* 3.07* 2.79* CA 8.1* 7.6* 7.6*  
MG 1.9 1.8 1.8 PHOS 3.6 2.7 2.3* ALB 2.1* 2.5* 2.4* TBILI 0.5 0.6 0.6 ALT 28 29 29 INR 1.3* 1.3* 1.3* Lab Results Component Value Date/Time Glucose (POC) 70 06/06/2020 06:13 AM  
 Glucose (POC) 73 06/05/2020 07:21 PM  
 
Recent Labs  
  06/14/20 
0800 PH 7.35  
PCO2 37 PO2 62* HCO3 20* Recent Labs  
  06/15/20 
0137 06/14/20 
0105 06/13/20 
0440 INR 1.3* 1.3* 1.3* All Micro Results Procedure Component Value Units Date/Time LEGIONELLA PNEUMOPHILA AG, URINE [[id number]] Collected:  06/05/20 1405 Order Status:  Completed Specimen:  Urine Updated:  06/08/20 1337 Source URINE     
  L pneumophila S1 Ag, urine Negative Comment: (NOTE) Presumptive negative for L. pneumophila serogroup 1 antigen in urine, 
suggesting no recent or current infection. Legionnaires' disease 
cannot be ruled out since other serogroups and species may also 
cause disease. Performed At: [address] [id number] Amena Winters MD [id number] CULTURE, RESPIRATORY/SPUTUM/BRONCH Kristanflorencio Brush [[id number]] Collected:  06/06/20 1022 Order Status:  Canceled Specimen:  Sputum MYCOPLASMA AB, IGM [[id number]] Collected:  06/05/20 1839 Order Status:  Completed Specimen:  Serum Updated:  06/06/20 0017 Mycoplasma Ab, IgM NONREACTIVE     
 RESPIRATORY PANEL,PCR,NASOPHARYNGEAL [[id number]] Collected:  06/05/20 1007 Order Status:  Completed Specimen:  Nasopharyngeal Updated:  06/05/20 1554 Adenovirus Not detected Coronavirus 229E Not detected Coronavirus HKU1 Not detected Coronavirus CVNL63 Not detected Coronavirus OC43 Not detected Metapneumovirus Not detected Rhinovirus and Enterovirus Not detected Influenza A Not detected Influenza A, subtype H1 Not detected Influenza A, subtype H3 Not detected INFLUENZA A H1N1 PCR Not detected Influenza B Not detected Parainfluenza 1 Not detected Parainfluenza 2 Not detected Parainfluenza 3 Not detected Parainfluenza virus 4 Not detected RSV by PCR Not detected B. parapertussis, PCR Not detected Bordetella pertussis - PCR Not detected Chlamydophila pneumoniae DNA, QL, PCR Not detected Mycoplasma pneumoniae DNA, QL, PCR Not detected I have reviewed notes of prior 24hr. Other pertinent lab: Total time spent with patient: 28 Care Plan discussed with: Patient, Nursing Staff and >50% of time spent in counseling and coordination of care Discussed:  Care Plan Prophylaxis:  Lovenox Disposition:  Home w/Family 
        
___________________________________________________ Attending Physician: Anya Lamas MD

## 2020-06-15 NOTE — PROGRESS NOTES
6/15/2020 9:05 AM EMR reviewed, pt continues on 3L Anthony. Pt has completed remdesivir therapy. CM will follow for final discharge needs. JOHN Wing Transitions of Care Plan: 
1. COVID19+ 
2. On 3L of Anthony 3. Home with medically stable 4. Family will transport pt home

## 2020-06-15 NOTE — PROGRESS NOTES
Anthony cartridge checked at 1045. NC was under the patient not in nose, found patient getting out of bed. NC placed back on patient at 3lpm. 
No medication had been administered since it was last changed at 0115. Sats- 94% HR-104 RR-22 
---------------------------------------------------------------------------------------------------------------- Cannula Lot # [id number] Cartridge Lot [id number] Cartridge ID [id number] RN aware of current status.

## 2020-06-15 NOTE — PROGRESS NOTES
Nutrition Assessment: 
 
RECOMMENDATIONS/INTERVENTION(S):  
1. Continue Regular diet at this time to promote adequate intakes. 2. Trial Gelatein x1/d (90 kcal, 20 gm protein) to promote adequate intakes. 3. Monitor PO intakes, GI function, labs, BG, and wt trends. ASSESSMENT:  
6/15: F/u and MD consult for general nutrition management. COVID-19+. Spoke with pt using phone  services. Pt with poor appetite, not very hungry. Pt was able to eat chicken noodle soup and salad for lunch. Pt reports she did not eat breakfast this morning, did not receive tray. Meal intake per EMR shows 25-50% intakes. Pt does not like Glucerna shake, will discontinue. Pt would prefer jello over ensure pudding - will change in order. Pt typically eats 3 smaller meals at home during the day, snacking not frequent. Pt typically has oatmeal and bread for breakfast and dinner and typically has chicken/ fish with soup for lunch. Pt hungry at this time, will send scheduled snack. No n/v. BM this AM. Skin intact. Labs - BUN 46 H, Cr 3.58 H, Ca 8.1 L. Meds - ascorbic acid, famotidine, heparin, zinc sulfate. 6/10: 66 y/o F admitted with PNA d/t COVID-19. Pt seen for LOS. PMH - HTN. COVID-19+, spoke with RN. Pt with 50% intakes today. Meal intake per EMR shows 25-75% intakes. Unable to obtain nutritional Hx at this time.  lb. BMI 34.7 c/w obesity. Hgb A1c 8.8 (6/10/19). BG/, 139, 110, 126. No note of GI distress. LBM 6/9. Skin intact. Labs - BUN 24 H, Cr 2.40 H, Ca 7.4 L, Phos 1.7 L. Meds - ascorbic acid, famotidine, heparin, phosphorus, zinc sulfate, furosemide. Diet Order: Regular 
% Eaten:   
No data found. Pertinent Medications: [x] Reviewed Labs: [x] Reviewed Anthropometrics: Height: 4' 11\" (149.9 cm) Weight: 78 kg (171 lb 15.3 oz) IBW (%IBW):   ( ) UBW (%UBW):   (  %) BMI: Body mass index is 34.73 kg/m². This BMI is indicative of: [] Underweight    [] Normal    [] Overweight    [x]  Obesity    []  Extreme Obesity (BMI>40) Estimated Nutrition Needs (Based on): 1528 Kcals/day(REE 1176 x 1.3) , 80 g(78 - 94 gm (1.0 - 1.2 gm/kg)) Protein Carbohydrate: At Least 130 g/day  Fluids: 1528 mL/day (1 ml/kcal) Last BM: 6/14   [x]Active     []Hyperactive  []Hypoactive       [] Absent   BS Skin:    [x] Intact   [] Incision  [] Breakdown   [] DTI   [] Tears/Excoriation/Abrasion  []Edema [] Other: Wt Readings from Last 30 Encounters:  
06/06/20 78 kg (171 lb 15.3 oz) NUTRITION DIAGNOSES:  
Problem:  Inadequate energy intake Etiology: related to decreased ability to consume sufficient energy Signs/Symptoms: as evidenced by intakes meeting <EENs per EMR and report 6/15: Nutrition Dx continues. NUTRITION INTERVENTIONS: 
Meals/Snacks: General/healthful diet   Supplements: Commercial supplement GOAL:  
PO intakes >50% + scheduled snack + ONS within 3-5 days Cultural, Sabianist, or Ethnic Dietary Needs: None EDUCATION & DISCHARGE NEEDS:  
 [x] None Identified 
 [] Identified and Education Provided/Documented 
 [] Identified and Pt declined/was not appropriate [x] Interdisciplinary Care Plan Reviewed/Documented  
 [x] Discharge Needs:    CHO consistent diet 
 [] No Nutrition Related Discharge Needs NUTRITION RISK:  
Pt Is At Nutrition Risk  [x] No Nutrition Risk Identified  [] PT SEEN FOR:  
 [x]  MD Consult: []Calorie Count []Diabetic Diet Education []Diet Education []Electrolyte Management 
   [x]General Nutrition Management and Supplements []Management of Tube Feeding []TPN Recommendations []  RN Referral:  []MST score >=2 
   []Enteral/Parenteral Nutrition PTA []Pregnant: Gestational DM or Multigestation  
              [] Pressure Ulcer 
 
[]  Low BMI      []  Length of Stay       [] Dysphagia Diet         [] Ventilator [x]  Follow-up Previous Recommendations: 
 [x] Implemented          [] Not Implemented          [] Not Applicable Previous Goal: 
 [] Met              [x] Progressing Towards Goal              [] Not Progressing Towards Goal   [] Not Applicable Hussein Tirado RDN Pager [phone] Phone [phone]

## 2020-06-16 NOTE — PROGRESS NOTES
Bedside and Verbal shift change report given to [facility] (oncoming nurse) by Deonna Nicholas (offgoing nurse). Report included the following information SBAR, Kardex and Recent Results.

## 2020-06-16 NOTE — PROGRESS NOTES
Spiritual Care Assessment/Progress Note Flaca Boyce 
 
 
NAME: Royden Gowers      MRN: [id number] AGE: 67 y.o. SEX: female Pentecostal Affiliation: No preference Language: Bulgarian 6/16/2020     Total Time (in minutes): 5 Spiritual Assessment begun in [facility] 4[facility] POST SURG ORT 2 through conversation with: 
  
    [x]Patient        [] Family    [] Friend(s) Reason for Consult: Initial/Spiritual assessment, patient floor Spiritual beliefs: (Please include comment if needed) 
   [] Identifies with a mirella tradition:     
   [] Supported by a mirella community:        
   [] Claims no spiritual orientation:       
   [] Seeking spiritual identity:            
   [] Adheres to an individual form of spirituality:       
   [x] Not able to assess:                   
 
    
Identified resources for coping:  
   [] Prayer                           
   [] Music                  [] Guided Imagery 
   [] Family/friends                 [] Pet visits [] Devotional reading                         [] Unknown 
   [] Other:                                         
 
 
Interventions offered during this visit: (See comments for more details) Patient Interventions: Other (comment)(IV) Plan of Care: 
 
 [] Support spiritual and/or cultural needs  
 [] Support AMD and/or advance care planning process    
 [] Support grieving process 
 [] Coordinate Rites and/or Rituals  
 [] Coordination with community clergy [] No spiritual needs identified at this time 
 [] Detailed Plan of Care below (See Comments)  [] Make referral to Music Therapy 
[] Make referral to Pet Therapy    
[] Make referral to Addiction services 
[] Make referral to [facility] 
[] Make referral to Spiritual Care Partner 
[] No future visits requested       
[x] Follow up visits as needed Comments:  visited Mrs. Dieudonne Ibarra for an initial spiritual assessment on the Post Surgical Ortho unit. Mrs. Tish Javier is on droplet plus precautions so  collaborated with her RN and confirmed that Mrs. Tish Javier would be able to speak with the  via telephone at this time. Mrs. Tish Javier primary language is [place]an speaking, so  worked with Dyan Lama from [facility] to interpret for her. With Lolis's assistance,  attempted to reach Mrs. Tish Javier by phone several times, but she did not  the phone. 's will follow up as able and/or needed Jonathan Cardenas.  Paging Service: [phone])

## 2020-06-16 NOTE — PROGRESS NOTES
g. 101 seconds or greater than           
     i.  Hold infusion and Notify MD  
     ii. Check aPTT every 2 hours until less than 86 seconds.   
     iii. Resume infusion when aPTT less than 86 seconds and decrease infusion rate by 3 units/kg/hr from previous rate Concentration = 100 Units / 1mL

## 2020-06-16 NOTE — PROGRESS NOTES
Suresh Hutchinson [facility] 
[address], [place], [address] [facility] 
[phone] Medical Progress Note NAME: Fausto Elmore :  1945 MRM:  [id number] Date of service: 2020  7:56 AM 
 
  
Assessment and Plan: 1. Pneumonia due to COVID-19 virus: CXR on : Unchanged multilobar airspace disease. Continue vitamin C, zinc, statin. Completed azithromycin and Remdesivir. Follow inflammatory markers. d-dimer is rising and started on IV heparin gtt high risk of PE. BLE duplex negative for DVT 
  
2.  Acute respiratory failure with hypoxia: due to COVID-19. Supplemental O2 PRN, wean as tolerated. Monitor O2 requirement closely 
  
3. LESLEY (acute kidney injury) /CKD: Baseline renal function unknown, suspect at least CKD3. Worsening. Monitor BMP closely. Nephrology following. 
  
4. HTN (hypertension), benign: BP controlled. Continue Norvasc, IV hydralazine PRN Subjective: Chief Complaint[de-identified] Patient was seen and examined as a follow up for COVID 19. Chart was reviewed. still c/o cough and SOB 
 
ROS: 
(bold if positive, if negative) Cough SOBTolerating PT  Tolerating Diet Objective:  
 
Last 24hrs VS reviewed since prior progress note. Most recent are: 
 
Visit Vitals /84 (BP 1 Location: Left arm, BP Patient Position: At rest) Pulse 86 Temp 98.3 °F (36.8 °C) Resp 16 Ht 4' 11\" (1.499 m) Wt 78 kg (171 lb 15.3 oz) SpO2 97% BMI 34.73 kg/m² SpO2 Readings from Last 6 Encounters:  
20 97% O2 Flow Rate (L/min): 3 l/min No intake or output data in the 24 hours ending 20 1208 Physical Exam: 
 
Gen:  Well-developed, well-nourished, in no acute distress HEENT:  Pink conjunctivae, PERRL, hearing intact to voice, moist mucous membranes Neck:  Supple, without masses, thyroid non-tender Resp:  No accessory muscle use, clear breath sounds without wheezes rales or rhonchi Card:  No murmurs, normal S1, S2 without thrills, bruits or peripheral edema Abd:  Soft, non-tender, non-distended, normoactive bowel sounds are present, no palpable organomegaly and no detectable hernias Lymph:  No cervical or inguinal adenopathy Musc:  No cyanosis or clubbing Skin:  No rashes or ulcers, skin turgor is good Neuro:  Cranial nerves are grossly intact, no focal motor weakness, follows commands appropriately Psych:  Good insight, oriented to person, place and time, alert 
__________________________________________________________________ Medications Reviewed: (see below) Medications:  
 
Current Facility-Administered Medications Medication Dose Route Frequency  heparin 25,000 units in D5W 250 ml infusion  18-36 Units/kg/hr IntraVENous TITRATE  amLODIPine (NORVASC) tablet 5 mg  5 mg Oral DAILY  phenyleph-min oil-petrolatum (PREPARATION H) 0.25-14-74.9 % ointment   PeriANAL QID PRN  
 aspirin chewable tablet 324 mg  324 mg Oral DAILY  sodium chloride (NS) flush 5-40 mL  5-40 mL IntraVENous Q8H  
 sodium chloride (NS) flush 5-40 mL  5-40 mL IntraVENous PRN  
 acetaminophen (TYLENOL) tablet 650 mg  650 mg Oral Q4H PRN  
 naloxone (NARCAN) injection 0.4 mg  0.4 mg IntraVENous PRN  
 ondansetron (ZOFRAN) injection 4 mg  4 mg IntraVENous Q4H PRN  
 morphine injection 2 mg  2 mg IntraVENous Q4H PRN  
 ascorbic acid (vitamin C) (VITAMIN C) tablet 500 mg  500 mg Oral BID  zinc sulfate (ZINCATE) 220 (50) mg capsule 1 Cap  1 Cap Oral DAILY  atorvastatin (LIPITOR) tablet 40 mg  40 mg Oral DAILY  guaiFENesin ER (MUCINEX) tablet 1,200 mg  1,200 mg Oral BID  famotidine (PEPCID) tablet 20 mg  20 mg Oral QPM  
  
 
Lab Data Reviewed: (see below) Lab Review:  
 
Recent Labs  
  06/15/20 
0137 06/14/20 
0105 WBC 9.6 11.2* HGB 9.3* 10.5* HCT 29.2* 32.4*  
 368 Recent Labs  
  06/16/20 
0934 06/16/20 
0457 06/15/20 
0137 06/14/20 
0105   --  137 138 K 4.9  --  4.3 4.3   --  106 108 CO2 22  --  23 23 *  --  158* 211* BUN 54*  --  46* 37* CREA 4.18*  --  3.58* 3.07* CA 8.6  --  8.1* 7.6*  
MG  --   --  1.9 1.8 PHOS 3.9  --  3.6 2.7 ALB 2.2*  --  2.1* 2.5* TBILI  --   --  0.5 0.6 ALT 27  --  28 29 INR  --  1.2* 1.3* 1.3* Lab Results Component Value Date/Time Glucose (POC) 70 06/06/2020 06:13 AM  
 Glucose (POC) 73 06/05/2020 07:21 PM  
 
Recent Labs  
  06/14/20 
0800 PH 7.35  
PCO2 37 PO2 62* HCO3 20* Recent Labs  
  06/16/20 
0457 06/15/20 
0137 06/14/20 
0105 INR 1.2* 1.3* 1.3* All Micro Results Procedure Component Value Units Date/Time LEGIONELLA PNEUMOPHILA AG, URINE [[id number]] Collected:  06/05/20 1405 Order Status:  Completed Specimen:  Urine Updated:  06/08/20 1337 Source URINE     
  L pneumophila S1 Ag, urine Negative Comment: (NOTE) Presumptive negative for L. pneumophila serogroup 1 antigen in urine, 
suggesting no recent or current infection. Legionnaires' disease 
cannot be ruled out since other serogroups and species may also 
cause disease. Performed At: [address] [id number] Aditya Khan MD [id number] CULTURE, RESPIRATORY/SPUTUM/BRONCH Antonella Ruvalcaba [[id number]] Collected:  06/06/20 1022 Order Status:  Canceled Specimen:  Sputum MYCOPLASMA AB, IGM [[id number]] Collected:  06/05/20 1839 Order Status:  Completed Specimen:  Serum Updated:  06/06/20 0017 Mycoplasma Ab, IgM NONREACTIVE     
 RESPIRATORY PANEL,PCR,NASOPHARYNGEAL [[id number]] Collected:  06/05/20 1007 Order Status:  Completed Specimen:  Nasopharyngeal Updated:  06/05/20 1554 Adenovirus Not detected Coronavirus 229E Not detected Coronavirus HKU1 Not detected Coronavirus CVNL63 Not detected Coronavirus OC43 Not detected Metapneumovirus Not detected Rhinovirus and Enterovirus Not detected Influenza A Not detected Influenza A, subtype H1 Not detected Influenza A, subtype H3 Not detected INFLUENZA A H1N1 PCR Not detected Influenza B Not detected Parainfluenza 1 Not detected Parainfluenza 2 Not detected Parainfluenza 3 Not detected Parainfluenza virus 4 Not detected RSV by PCR Not detected B. parapertussis, PCR Not detected Bordetella pertussis - PCR Not detected Chlamydophila pneumoniae DNA, QL, PCR Not detected Mycoplasma pneumoniae DNA, QL, PCR Not detected I have reviewed notes of prior 24hr. Other pertinent lab: Total time spent with patient: 28 Care Plan discussed with: Patient, Nursing Staff and >50% of time spent in counseling and coordination of care Discussed:  Care Plan Prophylaxis:  Lovenox Disposition:  Home w/Family 
        
___________________________________________________ Attending Physician: Consuelo Lema MD

## 2020-06-16 NOTE — PROGRESS NOTES
Pt on 2L NC with Anthony therapy. O2 sat = 95%. Cannister last changed @ 0615 and still has 3 bars left. Pt frequently disconnected from device. Will continue to follow.

## 2020-06-16 NOTE — PROGRESS NOTES
[facility] [place] [facility], [facility] Sleep [facility] Progress Note Name: Hallie Frost MRN: [id number] : 1945 Hospital: [address] [facility] Date: 2020 IMPRESSION:  
· COVID-19+ · Elevated troponin · LESLEY, ? CKD · HTN  
  
RECOMMENDATIONS:  
· Supplemental O2 as needed to keep sats > 88% · Completed azithro for 5 days. · Continue Anthony pulse; follow methemoglobin · Renal following and greatly appreciate help: may need RRT. On low potassium diet. · Trend inflammatory markers - continuing to trend up · Continue remdesivir for 5 days (day 5). Trend LFTs; check today · Continue vitamin C, zinc, lipitor · Heparin drip · IS  
· Mucinex · Pepcid Subjective:  
 
Ms. Yoan Hernandez is a 75yo female w/ history of HTN who presented to the ER on  w/ complaints of shortness of breath, weakness and chest pain. Sats 93% on RA. Has family members that are COVID+. Home med list includes 10mg prednisone that she has apparently been taking since April for \"pain. \" 
 
 Interval history Afebrile Sats 92% on 3L NC 
BP stable Creat 4.18; worse Ferritin 415 worse D-dimer 1.72; worse Methemoglobin 0.4 CXR : slight improvement in bilateral infiltrates LE dopplers without obvious DVT, but cannot be ruled out on the left ECHO: EF 53-16%; grade 1 diastolic dysfunction ROS:  Not English speaking but states she feels good. Does have cough. Awake, alert and not in distress. Past Medical History:  
Diagnosis Date  
 HTN (hypertension), benign No past surgical history on file. Prior to Admission medications Medication Sig Start Date End Date Taking? Authorizing Provider  
lisinopriL (PRINIVIL, ZESTRIL) 5 mg tablet Take 5 mg by mouth daily. Yes Provider, Historical  
predniSONE (DELTASONE) 5 mg tablet Take 10 mg by mouth daily. 20 Yes Provider, Historical  
 
No Known Allergies Social History Tobacco Use  
  Smoking status: Never Smoker  Smokeless tobacco: Never Used Substance Use Topics  Alcohol use: Never Frequency: Never Family History Problem Relation Age of Onset  Diabetes Neg Hx Current Facility-Administered Medications Medication Dose Route Frequency  heparin 25,000 units in D5W 250 ml infusion  18-36 Units/kg/hr IntraVENous TITRATE  amLODIPine (NORVASC) tablet 5 mg  5 mg Oral DAILY  aspirin chewable tablet 324 mg  324 mg Oral DAILY  sodium chloride (NS) flush 5-40 mL  5-40 mL IntraVENous Q8H  
 ascorbic acid (vitamin C) (VITAMIN C) tablet 500 mg  500 mg Oral BID  zinc sulfate (ZINCATE) 220 (50) mg capsule 1 Cap  1 Cap Oral DAILY  atorvastatin (LIPITOR) tablet 40 mg  40 mg Oral DAILY  guaiFENesin ER (MUCINEX) tablet 1,200 mg  1,200 mg Oral BID  famotidine (PEPCID) tablet 20 mg  20 mg Oral QPM  
 
 
Review of Systems: A comprehensive review of systems was negative except for that written in the HPI. Objective:  
Vital Signs:   
Visit Vitals /77 (BP 1 Location: Left arm, BP Patient Position: At rest) Pulse 89 Temp 98.1 °F (36.7 °C) Resp 15 Ht 4' 11\" (1.499 m) Wt 78 kg (171 lb 15.3 oz) SpO2 92% BMI 34.73 kg/m² O2 Device: Nasal cannula(Anthony) O2 Flow Rate (L/min): 3 l/min Temp (24hrs), Av.9 °F (36.6 °C), Min:97.6 °F (36.4 °C), Max:98.1 °F (36.7 °C) Intake/Output:  
Last shift:      No intake/output data recorded. Last 3 shifts:  1901 -  0700 In: 253.1 [I.V.:253.1] Out: 200 [Urine:200] No intake or output data in the 24 hours ending 20 1030 Physical Exam:  
General:  Alert, cooperative, no acute distress, appears stated age. Head:  NCAT Eyes:  EOMI, conjunctive clear Nose: Nares normal, NC in place Throat: MMM Neck:  Supple, FROM, trachea midline Lungs:   Deferred Chest wall:  Normal shape, nontender Heart:  Deferred Abdomen:   Obese, soft, NT Extremities: No c/c/e  
 Pulses: 2+ Skin: CDI, no rash Lymph nodes: Deferred Neurologic: No focal findings, following commands Data review:  
 
Recent Results (from the past 24 hour(s)) PTT Collection Time: 06/15/20  6:13 PM  
Result Value Ref Range aPTT 124.3 (HH) 22.1 - 32.0 sec  
 aPTT, therapeutic range     58.0 - 77.0 SECS  
C REACTIVE PROTEIN, QT Collection Time: 06/15/20 11:04 PM  
Result Value Ref Range C-Reactive protein 13.10 (H) 0.00 - 0.60 mg/dL LD Collection Time: 06/15/20 11:04 PM  
Result Value Ref Range  (H) 81 - 246 U/L  
PTT Collection Time: 06/15/20 11:04 PM  
Result Value Ref Range aPTT 83.5 (H) 22.1 - 32.0 sec  
 aPTT, therapeutic range     58.0 - 77.0 SECS  
NT-PRO BNP Collection Time: 06/15/20 11:04 PM  
Result Value Ref Range NT pro-BNP 1,610 (H) <450 PG/ML  
PROTHROMBIN TIME + INR Collection Time: 06/16/20  4:57 AM  
Result Value Ref Range INR 1.2 (H) 0.9 - 1.1 Prothrombin time 12.4 (H) 9.0 - 11.1 sec PTT Collection Time: 06/16/20  4:57 AM  
Result Value Ref Range aPTT 64.6 (H) 22.1 - 32.0 sec  
 aPTT, therapeutic range     58.0 - 77.0 SECS FIBRINOGEN Collection Time: 06/16/20  4:57 AM  
Result Value Ref Range Fibrinogen 516 (H) 200 - 475 mg/dL D DIMER Collection Time: 06/16/20  4:57 AM  
Result Value Ref Range D-dimer 1.72 (H) 0.00 - 0.65 mg/L FEU FERRITIN Collection Time: 06/16/20  4:57 AM  
Result Value Ref Range Ferritin 415 (H) 26 - 388 NG/ML  
RENAL FUNCTION PANEL Collection Time: 06/16/20  9:34 AM  
Result Value Ref Range Sodium 136 136 - 145 mmol/L Potassium 4.9 3.5 - 5.1 mmol/L Chloride 104 97 - 108 mmol/L  
 CO2 22 21 - 32 mmol/L Anion gap 10 5 - 15 mmol/L Glucose 154 (H) 65 - 100 mg/dL BUN 54 (H) 6 - 20 MG/DL Creatinine 4.18 (H) 0.55 - 1.02 MG/DL  
 BUN/Creatinine ratio 13 12 - 20 GFR est AA 13 (L) >60 ml/min/1.73m2 GFR est non-AA 10 (L) >60 ml/min/1.73m2 Calcium 8.6 8.5 - 10.1 MG/DL Phosphorus 3.9 2.6 - 4.7 MG/DL Albumin 2.2 (L) 3.5 - 5.0 g/dL Imaging: 
I have personally reviewed the patients radiographs and have reviewed the reports: 
CXR (6/8/2020): There are diffuse patchy bilateral infiltrates, increased in density as compared to the prior study. CXR (6/12/2020): There is patchy bilateral upper lobe airspace disease that has increased as compared to the prior study. CXR 6/16:  Slight improvement in bilateral infiltration Nikolai Vivar NP

## 2020-06-16 NOTE — PROGRESS NOTES
Patient Anthony changed [id number] Cartridge Identifier [id number] Lot # [id number] Cannula Lot # [id number] Device # [id number] O2Sat 93% on 2lnc

## 2020-06-16 NOTE — PROGRESS NOTES
Suresh Hutchinson [place] [address] Ramya Alston YOB: 1945 Assessment & Plan:  
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 
HTN 
HL 
 
Rec: 
After some initial improvement now worsening Creat continues to rise and seems likely she may need RRT soon Change to low K diet Will have conference with pt and  tomorrow, unless improvement in function Subjective:  
CC: F/u LESLEY 
HPI: Resp status stable on 3L NC. Making urine, exact volume uncertain. Creat continues to rise. K and bcb normal. 
 
Current Facility-Administered Medications Medication Dose Route Frequency  heparin 25,000 units in D5W 250 ml infusion  18-36 Units/kg/hr IntraVENous TITRATE  amLODIPine (NORVASC) tablet 5 mg  5 mg Oral DAILY  phenyleph-min oil-petrolatum (PREPARATION H) 0.25-14-74.9 % ointment   PeriANAL QID PRN  
 aspirin chewable tablet 324 mg  324 mg Oral DAILY  sodium chloride (NS) flush 5-40 mL  5-40 mL IntraVENous Q8H  
 sodium chloride (NS) flush 5-40 mL  5-40 mL IntraVENous PRN  
 acetaminophen (TYLENOL) tablet 650 mg  650 mg Oral Q4H PRN  
 naloxone (NARCAN) injection 0.4 mg  0.4 mg IntraVENous PRN  
 ondansetron (ZOFRAN) injection 4 mg  4 mg IntraVENous Q4H PRN  
 morphine injection 2 mg  2 mg IntraVENous Q4H PRN  
 ascorbic acid (vitamin C) (VITAMIN C) tablet 500 mg  500 mg Oral BID  zinc sulfate (ZINCATE) 220 (50) mg capsule 1 Cap  1 Cap Oral DAILY  atorvastatin (LIPITOR) tablet 40 mg  40 mg Oral DAILY  guaiFENesin ER (MUCINEX) tablet 1,200 mg  1,200 mg Oral BID  famotidine (PEPCID) tablet 20 mg  20 mg Oral QPM  
  
 
 
Objective:  
 
Vitals: 
Blood pressure 115/77, pulse 89, temperature 98.1 °F (36.7 °C), resp. rate 15, height 4' 11\" (1.499 m), weight 78 kg (171 lb 15.3 oz), SpO2 92 %. Temp (24hrs), Av.9 °F (36.6 °C), Min:97.6 °F (36.4 °C), Max:98.1 °F (36.7 °C) Intake and Output: 
No intake/output data recorded. 06/14 1901 - 06/16 0700 In: 253.1 [I.V.:253.1] Out: 200 [Urine:200] Physical Exam:              
IN person exam deferred due to COVID isolation status ECG/rhythm: 
 
Data Review No results for input(s): TNIPOC in the last 72 hours. No lab exists for component: ITNL No results for input(s): CPK, CKMB, TROIQ in the last 72 hours. Recent Labs  
  06/16/20 
0934 06/15/20 
0137 06/14/20 
0105  137 138  
K 4.9 4.3 4.3  106 108 CO2 22 23 23 BUN 54* 46* 37* CREA 4.18* 3.58* 3.07* * 158* 211* PHOS 3.9 3.6 2.7 MG  --  1.9 1.8  
CA 8.6 8.1* 7.6* ALB 2.2* 2.1* 2.5* WBC  --  9.6 11.2* HGB  --  9.3* 10.5* HCT  --  29.2* 32.4* PLT  --  334 368 Recent Labs  
  06/16/20 
0457 06/15/20 
2304 06/15/20 
1813  06/15/20 
0137  06/14/20 
0105 INR 1.2*  --   --   --  1.3*  --  1.3* PTP 12.4*  --   --   --  13.2*  --  13.5* APTT 64.6* 83.5* 124.3*   < > 80.9*   < > 77.8*  
 < > = values in this interval not displayed. Needs: urine analysis, urine sodium, protein and creatinine Lab Results Component Value Date/Time Sodium,urine random 80 06/07/2020 09:03 PM  
 Creatinine, urine 59.00 06/07/2020 09:03 PM  
 Creatinine, urine 62.10 06/07/2020 09:03 PM  
 
 
 
 
: Nu Maloney MD 
6/16/2020 [facility]: 
[email or web address] Special[facility] On Call [email or web address] On Call Mohit Chisholm office: 
[address] [place] [address], [address] [place] [address], [id number] [place] [address] Phone: [phone] Fax :     [phone] [facility] office: 
[address] [facility], [address] Phone - [phone] Fax - [phone]

## 2020-06-16 NOTE — PROGRESS NOTES
Problem: Airway Clearance - Ineffective Goal: Achieve or maintain patent airway Outcome: Progressing Towards Goal 
  
Problem: Gas Exchange - Impaired Goal: Absence of hypoxia Outcome: Progressing Towards Goal 
Goal: Promote optimal lung function Outcome: Progressing Towards Goal 
  
Problem: Breathing Pattern - Ineffective Goal: Ability to achieve and maintain a regular respiratory rate Outcome: Progressing Towards Goal 
  
Problem: Body Temperature -  Risk of, Imbalanced Goal: Ability to maintain a body temperature within defined limits Outcome: Progressing Towards Goal 
Goal: Will regain or maintain usual level of consciousness Outcome: Progressing Towards Goal 
Goal: Complications related to the disease process, condition or treatment will be avoided or minimized Outcome: Progressing Towards Goal 
  
Problem: Isolation Precautions - Risk of Spread of Infection Goal: Prevent transmission of infectious organism to others Outcome: Progressing Towards Goal 
  
Problem: Nutrition Deficits Goal: Optimize nutrtional status Outcome: Progressing Towards Goal 
  
Problem: Risk for Fluid Volume Deficit Goal: Maintain normal heart rhythm Outcome: Progressing Towards Goal 
Goal: Maintain absence of muscle cramping Outcome: Progressing Towards Goal 
Goal: Maintain normal serum potassium, sodium, calcium, phosphorus, and pH Outcome: Progressing Towards Goal 
  
Problem: Loneliness or Risk for Loneliness Goal: Demonstrate positive use of time alone when socialization is not possible Outcome: Progressing Towards Goal 
  
Problem: Fatigue Goal: Verbalize increase energy and improved vitality Outcome: Progressing Towards Goal 
  
Problem: Patient Education: Go to Patient Education Activity Goal: Patient/Family Education Outcome: Progressing Towards Goal 
  
Problem: Falls - Risk of 
Goal: *Absence of Falls Description: Document Anne-Marie Hyde Fall Risk and appropriate interventions in the flowsheet. Outcome: Progressing Towards Goal 
Note: Fall Risk Interventions: 
Mobility Interventions: Bed/chair exit alarm, Communicate number of staff needed for ambulation/transfer Mentation Interventions: Bed/chair exit alarm Medication Interventions: Bed/chair exit alarm Elimination Interventions: Patient to call for help with toileting needs, Call light in reach History of Falls Interventions: Bed/chair exit alarm Problem: Patient Education: Go to Patient Education Activity Goal: Patient/Family Education Outcome: Progressing Towards Goal 
  
Problem: Pressure Injury - Risk of 
Goal: *Prevention of pressure injury Description: Document Sixto Scale and appropriate interventions in the flowsheet. Outcome: Progressing Towards Goal 
Note: Pressure Injury Interventions: 
Sensory Interventions: Assess changes in LOC Moisture Interventions: Absorbent underpads, Minimize layers, Maintain skin hydration (lotion/cream) Activity Interventions: Increase time out of bed Mobility Interventions: HOB 30 degrees or less Nutrition Interventions: Offer support with meals,snacks and hydration Friction and Shear Interventions: Apply protective barrier, creams and emollients, Lift team/patient mobility team 
 
  
 
 
 
  
Problem: Patient Education: Go to Patient Education Activity Goal: Patient/Family Education Outcome: Progressing Towards Goal 
  
Problem: Infection - Risk of, Surgical Site Infection Goal: *Absence of surgical site infection signs and symptoms Outcome: Progressing Towards Goal 
  
Problem: Patient Education: Go to Patient Education Activity Goal: Patient/Family Education Outcome: Progressing Towards Goal 
  
Problem: Risk for Spread of Infection Goal: Prevent transmission of infectious organism to others Description: Prevent the transmission of infectious organisms to other patients, staff members, and visitors.  
Outcome: Progressing Towards Goal 
  
 Problem: Patient Education:  Go to Education Activity Goal: Patient/Family Education Outcome: Progressing Towards Goal 
  
Problem: Pain Goal: *Control of Pain Outcome: Progressing Towards Goal 
Goal: *PALLIATIVE CARE:  Alleviation of Pain Outcome: Progressing Towards Goal 
  
Problem: Patient Education: Go to Patient Education Activity Goal: Patient/Family Education Outcome: Progressing Towards Goal

## 2020-06-16 NOTE — PROGRESS NOTES
Patient Anthony changed @ 31 75 62 Cartridge Identifier [id number] Lot # [id number] Cannula Lot # [id number] Device # [id number] O2 sat 92% on 2lnc

## 2020-06-16 NOTE — PROGRESS NOTES
0700: Bedside shift change report given to [facility] (oncoming nurse) by Treva Be (offgoing nurse). Report included the following information SBAR, Kardex, ED Summary, Intake/Output, MAR and Recent Results Rate verified the heparin drip, rate of 3.205 units/kg/hour. 1900: Bedside shift change report given to Con Hightower (oncoming nurse) by [facility] (offgoing nurse).  Report included the following information SBAR, Kardex, Intake/Output, MAR, Recent Results,

## 2020-06-16 NOTE — PROGRESS NOTES
6/16/2020 2:29 PM EMR reviewed and spoke with treating nephrologist. Potential for pt to need dialysis while inpatient but too soon to tell if pt will need dialysis in the community. Pt is uninsured, will likely have to pursue [facility] contract with [facility]. CM will follow. JOHN Hope Transitions of Care Plan: 
1. COVID19+ 
2. On 2L of Anthony 3. Home with medically stable 4. Family will transport pt home

## 2020-06-17 NOTE — PROGRESS NOTES
[facility] [place] [facility], Critical Care, [facility] Sleep Medicine Progress Note Name: Ramya Alston MRN: [id number] : 1945 Hospital: [facility] Date: 2020 IMPRESSION:  
· Acute Respiratory Failure with Hypoxia; improving · COVID-19+ · Elevated troponin · LESLEY, ? CKD · HTN  
  
RECOMMENDATIONS:  
· Supplemental O2 as needed to keep sats > 88% and now on 1L · Completed azithro for 5 days. · Stop Anthony pulse today and monitor closely. · Renal following and greatly appreciate help: creat slightly improved. No urgent need for RRT · Trend inflammatory markers; improving · Continue remdesivir for 5 days (day 5). Trend LFTs; check today · Continue vitamin C, zinc, lipitor · Heparin drip · IS  
· Mucinex · Pepcid Subjective:  
 
Ms. Darling Kate is a 75yo female w/ history of HTN who presented to the ER on  w/ complaints of shortness of breath, weakness and chest pain. Sats 93% on RA. Has family members that are COVID+. Home med list includes 10mg prednisone that she has apparently been taking since April for \"pain. \" 
 
 Interval history Afebrile HR 110s Sats 96% on 2L NC 
BP stable WBC 11.3 Hgb 8.8 Pro-BNP 1254; better ; better CRP 9.11; better Feritin 427 Methemoglobin 0.4 D-dimer 1.41; better Creat 4.2/stable 600 Ml urine output CXR : slight improvement in bilateral infiltrates LE dopplers without obvious DVT, but cannot be ruled out on the left ECHO: EF 99-74%; grade 1 diastolic dysfunction ROS:  .Feels better. Denies complaints this morning. Reportedly taking NC off frequently. Past Medical History:  
Diagnosis Date  
 HTN (hypertension), benign No past surgical history on file. Prior to Admission medications Medication Sig Start Date End Date Taking? Authorizing Provider  
lisinopriL (PRINIVIL, ZESTRIL) 5 mg tablet Take 5 mg by mouth daily.    Yes Provider, Historical  
 predniSONE (DELTASONE) 5 mg tablet Take 10 mg by mouth daily. 20 Yes Provider, Historical  
 
No Known Allergies Social History Tobacco Use  Smoking status: Never Smoker  Smokeless tobacco: Never Used Substance Use Topics  Alcohol use: Never Frequency: Never Family History Problem Relation Age of Onset  Diabetes Neg Hx Current Facility-Administered Medications Medication Dose Route Frequency  dexAMETHasone (DECADRON) tablet 10 mg  10 mg Oral DAILY  heparin 25,000 units in D5W 250 ml infusion  18-36 Units/kg/hr IntraVENous TITRATE  amLODIPine (NORVASC) tablet 5 mg  5 mg Oral DAILY  aspirin chewable tablet 324 mg  324 mg Oral DAILY  sodium chloride (NS) flush 5-40 mL  5-40 mL IntraVENous Q8H  
 ascorbic acid (vitamin C) (VITAMIN C) tablet 500 mg  500 mg Oral BID  zinc sulfate (ZINCATE) 220 (50) mg capsule 1 Cap  1 Cap Oral DAILY  atorvastatin (LIPITOR) tablet 40 mg  40 mg Oral DAILY  guaiFENesin ER (MUCINEX) tablet 1,200 mg  1,200 mg Oral BID  famotidine (PEPCID) tablet 20 mg  20 mg Oral QPM  
 
 
Review of Systems: A comprehensive review of systems was negative except for that written in the HPI. Objective:  
Vital Signs:   
Visit Vitals /73 (BP 1 Location: Left arm, BP Patient Position: At rest) Pulse (!) 112 Temp 97.8 °F (36.6 °C) Resp 18 Ht 4' 11\" (1.499 m) Wt 78 kg (171 lb 15.3 oz) SpO2 96% BMI 34.73 kg/m² O2 Device: Nasal cannula O2 Flow Rate (L/min): 2 l/min Temp (24hrs), Av °F (36.7 °C), Min:97.7 °F (36.5 °C), Max:98.3 °F (36.8 °C) Intake/Output:  
Last shift:       07 - 1900 In: -  
Out: 761 [IMTTB:373] Last 3 shifts: 06/15 1901 -  0700 In: -  
Out: 071 [XFXTA:530] Intake/Output Summary (Last 24 hours) at 2020 1018 Last data filed at 2020 2867 Gross per 24 hour Intake  Output 900 ml Net -900 ml Physical Exam: General:  Alert, cooperative, no acute distress, appears stated age. Head:  NCAT Eyes:  EOMI, conjunctive clear Nose: Nares normal, NC in place Throat: MMM Neck:  Supple, FROM, trachea midline Lungs:   Deferred Chest wall:  Normal shape, nontender Heart:  Deferred Abdomen:   Obese, soft, NT Extremities: No c/c/e Pulses: 2+ Skin: CDI, no rash Lymph nodes: Deferred Neurologic: No focal findings, following commands Data review:  
 
Recent Results (from the past 24 hour(s)) PTT Collection Time: 06/16/20 10:51 AM  
Result Value Ref Range aPTT 66.7 (H) 22.1 - 32.0 sec  
 aPTT, therapeutic range     58.0 - 77.0 SECS  
PROTHROMBIN TIME + INR Collection Time: 06/17/20  4:33 AM  
Result Value Ref Range INR 1.2 (H) 0.9 - 1.1 Prothrombin time 12.4 (H) 9.0 - 11.1 sec PTT Collection Time: 06/17/20  4:33 AM  
Result Value Ref Range aPTT 50.5 (H) 22.1 - 32.0 sec  
 aPTT, therapeutic range     58.0 - 77.0 SECS FIBRINOGEN Collection Time: 06/17/20  4:33 AM  
Result Value Ref Range Fibrinogen 434 200 - 475 mg/dL D DIMER Collection Time: 06/17/20  4:33 AM  
Result Value Ref Range D-dimer 1.41 (H) 0.00 - 0.65 mg/L FEU  
C REACTIVE PROTEIN, QT Collection Time: 06/17/20  4:33 AM  
Result Value Ref Range C-Reactive protein 9.11 (H) 0.00 - 0.60 mg/dL LD Collection Time: 06/17/20  4:33 AM  
Result Value Ref Range  (H) 81 - 246 U/L FERRITIN Collection Time: 06/17/20  4:33 AM  
Result Value Ref Range Ferritin 427 (H) 26 - 388 NG/ML  
CBC WITH AUTOMATED DIFF Collection Time: 06/17/20  4:33 AM  
Result Value Ref Range WBC 11.3 (H) 3.6 - 11.0 K/uL  
 RBC 3.02 (L) 3.80 - 5.20 M/uL HGB 8.8 (L) 11.5 - 16.0 g/dL HCT 27.9 (L) 35.0 - 47.0 % MCV 92.4 80.0 - 99.0 FL  
 MCH 29.1 26.0 - 34.0 PG  
 MCHC 31.5 30.0 - 36.5 g/dL  
 RDW 13.8 11.5 - 14.5 % PLATELET 919 077 - 190 K/uL MPV 9.4 8.9 - 12.9 FL  
 NRBC 0.0 0  WBC ABSOLUTE NRBC 0.00 0.00 - 0.01 K/uL NEUTROPHILS 88 (H) 32 - 75 % LYMPHOCYTES 9 (L) 12 - 49 % MONOCYTES 2 (L) 5 - 13 % EOSINOPHILS 0 0 - 7 % BASOPHILS 0 0 - 1 % METAMYELOCYTES 1 (H) 0 % IMMATURE GRANULOCYTES 0 %  
 ABS. NEUTROPHILS 9.9 (H) 1.8 - 8.0 K/UL  
 ABS. LYMPHOCYTES 1.0 0.8 - 3.5 K/UL  
 ABS. MONOCYTES 0.2 0.0 - 1.0 K/UL  
 ABS. EOSINOPHILS 0.0 0.0 - 0.4 K/UL  
 ABS. BASOPHILS 0.0 0.0 - 0.1 K/UL  
 ABS. IMM. GRANS. 0.0 K/UL  
 DF MANUAL    
 RBC COMMENTS ANISOCYTOSIS 1+ 
    
 RBC COMMENTS POLYCHROMASIA PRESENT 
    
 RBC COMMENTS GILES CELLS 
PRESENT 
    
METABOLIC PANEL, COMPREHENSIVE Collection Time: 06/17/20  4:33 AM  
Result Value Ref Range Sodium 133 (L) 136 - 145 mmol/L Potassium 5.1 3.5 - 5.1 mmol/L Chloride 105 97 - 108 mmol/L  
 CO2 20 (L) 21 - 32 mmol/L Anion gap 8 5 - 15 mmol/L Glucose 273 (H) 65 - 100 mg/dL BUN 62 (H) 6 - 20 MG/DL Creatinine 4.20 (H) 0.55 - 1.02 MG/DL  
 BUN/Creatinine ratio 15 12 - 20 GFR est AA 12 (L) >60 ml/min/1.73m2 GFR est non-AA 10 (L) >60 ml/min/1.73m2 Calcium 8.7 8.5 - 10.1 MG/DL Bilirubin, total 0.6 0.2 - 1.0 MG/DL  
 ALT (SGPT) 29 12 - 78 U/L  
 AST (SGOT) 64 (H) 15 - 37 U/L Alk. phosphatase 176 (H) 45 - 117 U/L Protein, total 6.0 (L) 6.4 - 8.2 g/dL Albumin 2.2 (L) 3.5 - 5.0 g/dL Globulin 3.8 2.0 - 4.0 g/dL A-G Ratio 0.6 (L) 1.1 - 2.2 NT-PRO BNP Collection Time: 06/17/20  4:33 AM  
Result Value Ref Range NT pro-BNP 1,254 (H) <450 PG/ML  
PHOSPHORUS Collection Time: 06/17/20  4:33 AM  
Result Value Ref Range Phosphorus 4.4 2.6 - 4.7 MG/DL  
SAMPLES BEING HELD Collection Time: 06/17/20  4:33 AM  
Result Value Ref Range SAMPLES BEING HELD 3BLU'S   
 COMMENT Add-on orders for these samples will be processed based on acceptable specimen integrity and analyte stability, which may vary by analyte. Imaging: I have personally reviewed the patients radiographs and have reviewed the reports: 
CXR (6/8/2020): There are diffuse patchy bilateral infiltrates, increased in density as compared to the prior study. CXR (6/12/2020): There is patchy bilateral upper lobe airspace disease that has increased as compared to the prior study. CXR 6/16:  Slight improvement in bilateral infiltration Nikolai Vivar NP

## 2020-06-17 NOTE — PROGRESS NOTES
6/17/2020 1:33 PM ANTHONY discontinued by Pulmonary. CM will follow for O2 needs. 6/17/2020 10:27 AM Spoke with treating Nephrologist, pt will not require acute dialysis at this time. CM will follow. 6/17/2020 8:22 AM EMR reviewed, pt remains on Anthony. TBD on discharge needs at this time. Nephrologist potentially to discuss need for dialysis with pt and interpretor today. CM will follow for needs following this conversation. JOHN Diallo Transitions of Care Plan: 
1. COVID19 
2. On 2L of Anthony, discontinued 3. Pt is self pay and per [facility] does not qualify for Medicaid 4. Home with medically stable 5. Family will transport pt home

## 2020-06-17 NOTE — PROGRESS NOTES
Suresh Hutchinson Daniella Enfield 78 [address] Rojo YOB: 1945 Assessment & Plan:  
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 
HTN 
HL 
 
Rec: 
Creat rising for several days but now stable Good UOP. K normal. 
Oxygenation improving (down to 2L) No acute need for RRT Continue low K diet D/w nursing Subjective:  
CC: F/u LESLEY 
HPI: Down to 2L NC. Creat stable at 4.2. HTN is controlled. Current Facility-Administered Medications Medication Dose Route Frequency  dexAMETHasone (DECADRON) tablet 10 mg  10 mg Oral DAILY  heparin 25,000 units in D5W 250 ml infusion  18-36 Units/kg/hr IntraVENous TITRATE  amLODIPine (NORVASC) tablet 5 mg  5 mg Oral DAILY  phenyleph-min oil-petrolatum (PREPARATION H) 0.25-14-74.9 % ointment   PeriANAL QID PRN  
 aspirin chewable tablet 324 mg  324 mg Oral DAILY  sodium chloride (NS) flush 5-40 mL  5-40 mL IntraVENous Q8H  
 sodium chloride (NS) flush 5-40 mL  5-40 mL IntraVENous PRN  
 acetaminophen (TYLENOL) tablet 650 mg  650 mg Oral Q4H PRN  
 naloxone (NARCAN) injection 0.4 mg  0.4 mg IntraVENous PRN  
 ondansetron (ZOFRAN) injection 4 mg  4 mg IntraVENous Q4H PRN  
 morphine injection 2 mg  2 mg IntraVENous Q4H PRN  
 ascorbic acid (vitamin C) (VITAMIN C) tablet 500 mg  500 mg Oral BID  zinc sulfate (ZINCATE) 220 (50) mg capsule 1 Cap  1 Cap Oral DAILY  atorvastatin (LIPITOR) tablet 40 mg  40 mg Oral DAILY  guaiFENesin ER (MUCINEX) tablet 1,200 mg  1,200 mg Oral BID  famotidine (PEPCID) tablet 20 mg  20 mg Oral QPM  
  
 
 
Objective:  
 
Vitals: 
Blood pressure 143/73, pulse (!) 112, temperature 97.8 °F (36.6 °C), resp. rate 18, height 4' 11\" (1.499 m), weight 78 kg (171 lb 15.3 oz), SpO2 96 %. Temp (24hrs), Av °F (36.7 °C), Min:97.7 °F (36.5 °C), Max:98.3 °F (36.8 °C) Intake and Output: 
701 - 1900 In: -  
Out: 065 [FOQZR:723] 06/15 1901 - 700 In: -  
 Out: 600 [Urine:600] Physical Exam:              
IN person exam deferred due to COVID isolation status ECG/rhythm: 
 
Data Review No results for input(s): TNIPOC in the last 72 hours. No lab exists for component: ITNL No results for input(s): CPK, CKMB, TROIQ in the last 72 hours. Recent Labs  
  06/17/20 
0433 06/16/20 
0934 06/15/20 
4535 * 136 137  
K 5.1 4.9 4.3  104 106 CO2 20* 22 23 BUN 62* 54* 46* CREA 4.20* 4.18* 3.58* * 154* 158* PHOS 4.4 3.9 3.6 MG  --   --  1.9 CA 8.7 8.6 8.1* ALB 2.2* 2.2* 2.1* WBC 11.3*  --  9.6 HGB 8.8*  --  9.3* HCT 27.9*  --  29.2*  
  --  334 Recent Labs  
  06/17/20 
0433 06/16/20 
1051 06/16/20 
0457  06/15/20 
1586 INR 1.2*  --  1.2*  --  1.3* PTP 12.4*  --  12.4*  --  13.2* APTT 50.5* 66.7* 64.6*   < > 80.9*  
 < > = values in this interval not displayed. Needs: urine analysis, urine sodium, protein and creatinine Lab Results Component Value Date/Time Sodium,urine random 80 06/07/2020 09:03 PM  
 Creatinine, urine 59.00 06/07/2020 09:03 PM  
 Creatinine, urine 62.10 06/07/2020 09:03 PM  
 
 
 
 
: Trina Phan MD 
6/17/2020 [facility]: 
[email or web address] [facility] [place] [facility] office: 
[address] [place] [address], [address] [place], [id number] [place] [address] Phone: [phone] Fax :     [phone] [facility] office: 
[address] [facility] Phone - [phone] Fax - [phone]

## 2020-06-17 NOTE — PROGRESS NOTES
Problem: Isolation Precautions - Risk of Spread of Infection Goal: Prevent transmission of infectious organism to others Outcome: Progressing Towards Goal 
  
Problem: Falls - Risk of 
Goal: *Absence of Falls Description: Document [place] Cea Fall Risk and appropriate interventions in the flowsheet. Outcome: Progressing Towards Goal 
Note: Fall Risk Interventions: 
Mobility Interventions: Patient to call before getting OOB Mentation Interventions: Bed/chair exit alarm Medication Interventions: Bed/chair exit alarm Elimination Interventions: Patient to call for help with toileting needs History of Falls Interventions: Bed/chair exit alarm

## 2020-06-17 NOTE — PROGRESS NOTES
Problem: Airway Clearance - Ineffective Goal: Achieve or maintain patent airway Outcome: Progressing Towards Goal 
  
Problem: Gas Exchange - Impaired Goal: Absence of hypoxia Outcome: Progressing Towards Goal 
Goal: Promote optimal lung function Outcome: Progressing Towards Goal 
  
Problem: Breathing Pattern - Ineffective Goal: Ability to achieve and maintain a regular respiratory rate Outcome: Progressing Towards Goal 
  
Problem: Body Temperature -  Risk of, Imbalanced Goal: Ability to maintain a body temperature within defined limits Outcome: Progressing Towards Goal 
Goal: Will regain or maintain usual level of consciousness Outcome: Progressing Towards Goal 
Goal: Complications related to the disease process, condition or treatment will be avoided or minimized Outcome: Progressing Towards Goal 
  
Problem: Isolation Precautions - Risk of Spread of Infection Goal: Prevent transmission of infectious organism to others Outcome: Progressing Towards Goal 
  
Problem: Nutrition Deficits Goal: Optimize nutrtional status Outcome: Progressing Towards Goal 
  
Problem: Risk for Fluid Volume Deficit Goal: Maintain normal heart rhythm Outcome: Progressing Towards Goal 
Goal: Maintain absence of muscle cramping Outcome: Progressing Towards Goal 
Goal: Maintain normal serum potassium, sodium, calcium, phosphorus, and pH Outcome: Progressing Towards Goal 
  
Problem: Loneliness or Risk for Loneliness Goal: Demonstrate positive use of time alone when socialization is not possible Outcome: Progressing Towards Goal 
  
Problem: Fatigue Goal: Verbalize increase energy and improved vitality Outcome: Progressing Towards Goal 
  
Problem: Patient Education: Go to Patient Education Activity Goal: Patient/Family Education Outcome: Progressing Towards Goal 
  
Problem: Falls - Risk of 
Goal: *Absence of Falls Description: Document Chrissy Alejandre Fall Risk and appropriate interventions in the flowsheet. Outcome: Progressing Towards Goal 
Note: Fall Risk Interventions: 
Mobility Interventions: Bed/chair exit alarm Mentation Interventions: Bed/chair exit alarm Medication Interventions: Bed/chair exit alarm Elimination Interventions: Call light in reach History of Falls Interventions: Bed/chair exit alarm Problem: Patient Education: Go to Patient Education Activity Goal: Patient/Family Education Outcome: Progressing Towards Goal 
  
Problem: Pressure Injury - Risk of 
Goal: *Prevention of pressure injury Description: Document Sixto Scale and appropriate interventions in the flowsheet. Outcome: Progressing Towards Goal 
Note: Pressure Injury Interventions: 
Sensory Interventions: Assess changes in LOC Moisture Interventions: Absorbent underpads, Minimize layers, Maintain skin hydration (lotion/cream) Activity Interventions: Increase time out of bed Mobility Interventions: HOB 30 degrees or less Nutrition Interventions: Document food/fluid/supplement intake Friction and Shear Interventions: Apply protective barrier, creams and emollients, Lift team/patient mobility team 
 
  
 
 
 
  
Problem: Patient Education: Go to Patient Education Activity Goal: Patient/Family Education Outcome: Progressing Towards Goal 
  
Problem: Infection - Risk of, Surgical Site Infection Goal: *Absence of surgical site infection signs and symptoms Outcome: Progressing Towards Goal 
  
Problem: Patient Education: Go to Patient Education Activity Goal: Patient/Family Education Outcome: Progressing Towards Goal 
  
Problem: Risk for Spread of Infection Goal: Prevent transmission of infectious organism to others Description: Prevent the transmission of infectious organisms to other patients, staff members, and visitors. Outcome: Progressing Towards Goal 
  
Problem: Patient Education:  Go to Education Activity Goal: Patient/Family Education Outcome: Progressing Towards Goal 
  
Problem: Pain Goal: *Control of Pain Outcome: Progressing Towards Goal 
Goal: *PALLIATIVE CARE:  Alleviation of Pain Outcome: Progressing Towards Goal 
  
Problem: Patient Education: Go to Patient Education Activity Goal: Patient/Family Education Outcome: Progressing Towards Goal

## 2020-06-17 NOTE — PROGRESS NOTES
Patient Anthony changed @ 31 [phone] Cartridge Identifier [id number] Lot # [id number] Cannula Lot # [id number] Device # [id number] O2 sat 93% on 1L NC

## 2020-06-17 NOTE — PROGRESS NOTES
Suresh Hutchinson [facility] 79 
[address] [place], [place], [id number] [place] [address] 
[phone] Medical Progress Note NAME: Brii Nielson :  1945 MRM:  [id number] Date of service: 2020  7:56 AM 
 
  
Assessment and Plan: 1. Pneumonia due to COVID-19 virus: CXR on : Unchanged multilobar airspace disease. Continue vitamin C, zinc, statin. Completed azithromycin and Remdesivir. Follow inflammatory markers. d-dimer is rising and started on IV heparin gtt high risk of PE. BLE duplex negative for DVT. Started on dexamethasone on  
  
2. Acute respiratory failure with hypoxia: due to COVID-19. Supplemental O2 PRN, wean as tolerated. Monitor O2 requirement closely 
  
3. LESLEY (acute kidney injury) /CKD: Baseline renal function unknown, suspect at least CKD3. Worsening. Monitor BMP closely. Nephrology following. 
  
4. HTN (hypertension), benign: BP controlled. Continue Norvasc, IV hydralazine PRN 5. Hyperglycemia. ? Due to steroid. Check A1C. Subjective: Chief Complaint[de-identified] Patient was seen and examined as a follow up for COVID 19. Chart was reviewed. c/o cough and SOB 
 
ROS: 
(bold if positive, if negative) Cough SOBTolerating PT  Tolerating Diet Objective:  
 
Last 24hrs VS reviewed since prior progress note. Most recent are: 
 
Visit Vitals /64 (BP 1 Location: Left arm, BP Patient Position: At rest) Pulse (!) 112 Temp 98.4 °F (36.9 °C) Resp 18 Ht 4' 11\" (1.499 m) Wt 78 kg (171 lb 15.3 oz) SpO2 93% BMI 34.73 kg/m² SpO2 Readings from Last 6 Encounters:  
20 93% O2 Flow Rate (L/min): 1 l/min Intake/Output Summary (Last 24 hours) at 2020 1221 Last data filed at 2020 9403 Gross per 24 hour Intake  Output 900 ml Net -900 ml Physical Exam: 
 
Gen:  Well-developed, well-nourished, in no acute distress HEENT:  Pink conjunctivae, PERRL, hearing intact to voice, moist mucous membranes Neck:  Supple, without masses, thyroid non-tender Resp:  No accessory muscle use, clear breath sounds without wheezes rales or rhonchi 
Card:  No murmurs, normal S1, S2 without thrills, bruits or peripheral edema Abd:  Soft, non-tender, non-distended, normoactive bowel sounds are present, no palpable organomegaly and no detectable hernias Lymph:  No cervical or inguinal adenopathy Musc:  No cyanosis or clubbing Skin:  No rashes or ulcers, skin turgor is good Neuro:  Cranial nerves are grossly intact, no focal motor weakness, follows commands appropriately Psych:  Good insight, oriented to person, place and time, alert 
__________________________________________________________________ Medications Reviewed: (see below) Medications:  
 
Current Facility-Administered Medications Medication Dose Route Frequency  dexAMETHasone (DECADRON) tablet 10 mg  10 mg Oral DAILY  heparin 25,000 units in D5W 250 ml infusion  18-36 Units/kg/hr IntraVENous TITRATE  amLODIPine (NORVASC) tablet 5 mg  5 mg Oral DAILY  phenyleph-min oil-petrolatum (PREPARATION H) 0.25-14-74.9 % ointment   PeriANAL QID PRN  
 aspirin chewable tablet 324 mg  324 mg Oral DAILY  sodium chloride (NS) flush 5-40 mL  5-40 mL IntraVENous Q8H  
 sodium chloride (NS) flush 5-40 mL  5-40 mL IntraVENous PRN  
 acetaminophen (TYLENOL) tablet 650 mg  650 mg Oral Q4H PRN  
 naloxone (NARCAN) injection 0.4 mg  0.4 mg IntraVENous PRN  
 ondansetron (ZOFRAN) injection 4 mg  4 mg IntraVENous Q4H PRN  
 morphine injection 2 mg  2 mg IntraVENous Q4H PRN  
 ascorbic acid (vitamin C) (VITAMIN C) tablet 500 mg  500 mg Oral BID  zinc sulfate (ZINCATE) 220 (50) mg capsule 1 Cap  1 Cap Oral DAILY  atorvastatin (LIPITOR) tablet 40 mg  40 mg Oral DAILY  guaiFENesin ER (MUCINEX) tablet 1,200 mg  1,200 mg Oral BID  famotidine (PEPCID) tablet 20 mg  20 mg Oral QPM  
  
 
Lab Data Reviewed: (see below) Lab Review:  
 
Recent Labs 06/17/20 
6506 06/15/20 
6942 WBC 11.3* 9.6 HGB 8.8* 9.3* HCT 27.9* 29.2*  
 334 Recent Labs  
  06/17/20 
0433 06/16/20 
0934 06/16/20 
0457 06/15/20 
3407 * 136  --  137  
K 5.1 4.9  --  4.3  104  --  106 CO2 20* 22  --  23  
* 154*  --  158* BUN 62* 54*  --  46* CREA 4.20* 4.18*  --  3.58* CA 8.7 8.6  --  8.1*  
MG  --   --   --  1.9 PHOS 4.4 3.9  --  3.6 ALB 2.2* 2.2*  --  2.1* TBILI 0.6  --   --  0.5 ALT 29 27  --  28 INR 1.2*  --  1.2* 1.3* Lab Results Component Value Date/Time Glucose (POC) 70 06/06/2020 06:13 AM  
 Glucose (POC) 73 06/05/2020 07:21 PM  
 
No results for input(s): PH, PCO2, PO2, HCO3, FIO2 in the last 72 hours. Recent Labs  
  06/17/20 
0433 06/16/20 
0457 06/15/20 
5664 INR 1.2* 1.2* 1.3* All Micro Results Procedure Component Value Units Date/Time LEGIONELLA PNEUMOPHILA AG, URINE [[id number]] Collected:  06/05/20 1405 Order Status:  Completed Specimen:  Urine Updated:  06/08/20 1337 Source URINE     
  L pneumophila S1 Ag, urine Negative Comment: (NOTE) Presumptive negative for L. pneumophila serogroup 1 antigen in urine, 
suggesting no recent or current infection. Legionnaires' disease 
cannot be ruled out since other serogroups and species may also 
cause disease. Performed At: [address] [id number] Lizett Boyle MD UW:[id number] CULTURE, RESPIRATORY/SPUTUM/BRONCH Orysia Devoid [[id number]] Collected:  06/06/20 1022 Order Status:  Canceled Specimen:  Sputum MYCOPLASMA AB, IGM [[id number]] Collected:  06/05/20 1839 Order Status:  Completed Specimen:  Serum Updated:  06/06/20 0017 Mycoplasma Ab, IgM NONREACTIVE     
 RESPIRATORY PANEL,PCR,NASOPHARYNGEAL [[id number]] Collected:  06/05/20 1007 Order Status:  Completed Specimen:  Nasopharyngeal Updated:  06/05/20 1554 Adenovirus Not detected Coronavirus 229E Not detected Coronavirus HKU1 Not detected Coronavirus CVNL63 Not detected Coronavirus OC43 Not detected Metapneumovirus Not detected Rhinovirus and Enterovirus Not detected Influenza A Not detected Influenza A, subtype H1 Not detected Influenza A, subtype H3 Not detected INFLUENZA A H1N1 PCR Not detected Influenza B Not detected Parainfluenza 1 Not detected Parainfluenza 2 Not detected Parainfluenza 3 Not detected Parainfluenza virus 4 Not detected RSV by PCR Not detected B. parapertussis, PCR Not detected Bordetella pertussis - PCR Not detected Chlamydophila pneumoniae DNA, QL, PCR Not detected Mycoplasma pneumoniae DNA, QL, PCR Not detected I have reviewed notes of prior 24hr. Other pertinent lab: Total time spent with patient: 28 Care Plan discussed with: Patient, Nursing Staff and >50% of time spent in counseling and coordination of care Discussed:  Care Plan Prophylaxis:  Lovenox Disposition:  Home w/Family 
        
___________________________________________________ Attending Physician: Shay Portillo MD

## 2020-06-18 NOTE — PROGRESS NOTES
[facility] Care, [facility] Sleep Medicine Progress Note Name: Renard Nieves MRN: [id number] : 1945 Hospital: [address] Date: 2020 IMPRESSION:  
· Acute Respiratory Failure with Hypoxia; improving · COVID-19+ · Elevated troponin · LESLEY, ? CKD · HTN  
  
RECOMMENDATIONS:  
· Supplemental O2 as needed to keep sats > 88% and now on 1L · Completed azithro for 5 days. · Stopped Anthony on  · Renal following. No urgent need for RRT. · Continue Dexamethasone · Trend inflammatory markers; improving · S/P course of Remdesivir; completed  · CXR tomorrow · Continue vitamin C, zinc, lipitor · Heparin drip · IS  
· Mucinex · Pepcid Subjective:  
 
Ms. Rush Telles is a 75yo female w/ history of HTN who presented to the ER on  w/ complaints of shortness of breath, weakness and chest pain. Sats 93% on RA. Has family members that are COVID+. Home med list includes 10mg prednisone that she has apparently been taking since April for \"pain. \" 
 
 Interval history Afebrile HR 70-80s; better Sats 96% on 2L NC 
WBC 17.4; increased. Hgb 8.3 Pro-BNP 2600, increased ; better CRP 5.8; better Feritin 384; better D-dimer 1.41; better Creat 4.02; better LFTs stable 800 Ml urine output CXR : slight improvement in bilateral infiltrates LE dopplers without obvious DVT, but cannot be ruled out on the left ECHO: EF 39-84%; grade 1 diastolic dysfunction ROS: Feels fine. Wants to go to the bathroom. No complaints. Past Medical History:  
Diagnosis Date  
 HTN (hypertension), benign No past surgical history on file. Prior to Admission medications Medication Sig Start Date End Date Taking? Authorizing Provider  
lisinopriL (PRINIVIL, ZESTRIL) 5 mg tablet Take 5 mg by mouth daily. Yes Provider, Historical  
predniSONE (DELTASONE) 5 mg tablet Take 10 mg by mouth daily.  20 20 Yes Provider, Historical  
 
No Known Allergies Social History Tobacco Use  Smoking status: Never Smoker  Smokeless tobacco: Never Used Substance Use Topics  Alcohol use: Never Frequency: Never Family History Problem Relation Age of Onset  Diabetes Neg Hx Current Facility-Administered Medications Medication Dose Route Frequency  polyethylene glycol (MIRALAX) packet 17 g  17 g Oral BID  senna-docusate (PERICOLACE) 8.6-50 mg per tablet 1 Tab  1 Tab Oral DAILY  dexAMETHasone (DECADRON) tablet 10 mg  10 mg Oral DAILY  heparin 25,000 units in D5W 250 ml infusion  18-36 Units/kg/hr IntraVENous TITRATE  amLODIPine (NORVASC) tablet 5 mg  5 mg Oral DAILY  aspirin chewable tablet 324 mg  324 mg Oral DAILY  sodium chloride (NS) flush 5-40 mL  5-40 mL IntraVENous Q8H  
 ascorbic acid (vitamin C) (VITAMIN C) tablet 500 mg  500 mg Oral BID  zinc sulfate (ZINCATE) 220 (50) mg capsule 1 Cap  1 Cap Oral DAILY  atorvastatin (LIPITOR) tablet 40 mg  40 mg Oral DAILY  guaiFENesin ER (MUCINEX) tablet 1,200 mg  1,200 mg Oral BID  famotidine (PEPCID) tablet 20 mg  20 mg Oral QPM  
 
 
Review of Systems: A comprehensive review of systems was negative except for that written in the HPI. Objective:  
Vital Signs:   
Visit Vitals /67 (BP 1 Location: Right arm, BP Patient Position: At rest) Pulse 92 Temp 97.7 °F (36.5 °C) Resp 16 Ht 4' 11\" (1.499 m) Wt 78 kg (171 lb 15.3 oz) SpO2 92% BMI 34.73 kg/m² O2 Device: Nasal cannula O2 Flow Rate (L/min): 2 l/min Temp (24hrs), Av.2 °F (36.8 °C), Min:97.7 °F (36.5 °C), Max:98.5 °F (36.9 °C) Intake/Output:  
Last shift:      No intake/output data recorded. Last 3 shifts:  1901 -  0700 In: -  
Out: 800 [Urine:800] Intake/Output Summary (Last 24 hours) at 2020 6773 Last data filed at 2020 1450 Gross per 24 hour Intake  Output 500 ml Net -500 ml Physical Exam:  
General:  Alert, cooperative, no acute distress, appears stated age. Head:  NCAT Eyes:  EOMI, conjunctive clear Nose: Nares normal, NC in place Throat: MMM Neck:  Supple, FROM, trachea midline Lungs:   Deferred Chest wall:  Normal shape, nontender Heart:  Deferred Abdomen:   Obese, soft, NT Extremities: No c/c/e Pulses: 2+ Skin: CDI, no rash Lymph nodes: Deferred Neurologic: No focal findings, following commands Data review:  
 
Recent Results (from the past 24 hour(s)) PTT Collection Time: 06/17/20  1:57 PM  
Result Value Ref Range aPTT 51.5 (H) 22.1 - 32.0 sec  
 aPTT, therapeutic range     58.0 - 77.0 SECS  
PTT Collection Time: 06/17/20  9:26 PM  
Result Value Ref Range aPTT 59.4 (H) 22.1 - 32.0 sec  
 aPTT, therapeutic range     58.0 - 77.0 SECS  
PROTHROMBIN TIME + INR Collection Time: 06/18/20  5:01 AM  
Result Value Ref Range INR 1.2 (H) 0.9 - 1.1 Prothrombin time 12.3 (H) 9.0 - 11.1 sec PTT Collection Time: 06/18/20  5:01 AM  
Result Value Ref Range aPTT 53.7 (H) 22.1 - 32.0 sec  
 aPTT, therapeutic range     58.0 - 77.0 SECS FIBRINOGEN Collection Time: 06/18/20  5:01 AM  
Result Value Ref Range Fibrinogen 408 200 - 475 mg/dL D DIMER Collection Time: 06/18/20  5:01 AM  
Result Value Ref Range D-dimer 0.91 (H) 0.00 - 0.65 mg/L FEU FERRITIN Collection Time: 06/18/20  5:01 AM  
Result Value Ref Range Ferritin 384 (H) 8 - 252 NG/ML  
CBC WITH AUTOMATED DIFF Collection Time: 06/18/20  5:01 AM  
Result Value Ref Range WBC 17.4 (H) 3.6 - 11.0 K/uL  
 RBC 2.86 (L) 3.80 - 5.20 M/uL HGB 8.3 (L) 11.5 - 16.0 g/dL HCT 26.4 (L) 35.0 - 47.0 % MCV 92.3 80.0 - 99.0 FL  
 MCH 29.0 26.0 - 34.0 PG  
 MCHC 31.4 30.0 - 36.5 g/dL  
 RDW 13.9 11.5 - 14.5 % PLATELET 682 210 - 616 K/uL MPV 10.1 8.9 - 12.9 FL  
 NRBC 0.1 (H) 0  WBC ABSOLUTE NRBC 0.02 (H) 0.00 - 0.01 K/uL NEUTROPHILS 74 32 - 75 % BAND NEUTROPHILS 6 0 - 6 % LYMPHOCYTES 14 12 - 49 % MONOCYTES 5 5 - 13 % EOSINOPHILS 0 0 - 7 % BASOPHILS 0 0 - 1 % METAMYELOCYTES 1 (H) 0 % IMMATURE GRANULOCYTES 0 %  
 ABS. NEUTROPHILS 13.9 (H) 1.8 - 8.0 K/UL  
 ABS. LYMPHOCYTES 2.4 0.8 - 3.5 K/UL  
 ABS. MONOCYTES 0.9 0.0 - 1.0 K/UL  
 ABS. EOSINOPHILS 0.0 0.0 - 0.4 K/UL  
 ABS. BASOPHILS 0.0 0.0 - 0.1 K/UL  
 ABS. IMM. GRANS. 0.0 K/UL  
 DF MANUAL    
 RBC COMMENTS NORMOCYTIC, NORMOCHROMIC METABOLIC PANEL, COMPREHENSIVE Collection Time: 06/18/20  5:01 AM  
Result Value Ref Range Sodium 136 136 - 145 mmol/L Potassium 4.7 3.5 - 5.1 mmol/L Chloride 109 (H) 97 - 108 mmol/L  
 CO2 20 (L) 21 - 32 mmol/L Anion gap 7 5 - 15 mmol/L Glucose 284 (H) 65 - 100 mg/dL BUN 69 (H) 6 - 20 MG/DL Creatinine 4.02 (H) 0.55 - 1.02 MG/DL  
 BUN/Creatinine ratio 17 12 - 20 GFR est AA 13 (L) >60 ml/min/1.73m2 GFR est non-AA 11 (L) >60 ml/min/1.73m2 Calcium 8.5 8.5 - 10.1 MG/DL Bilirubin, total 0.6 0.2 - 1.0 MG/DL  
 ALT (SGPT) 27 12 - 78 U/L  
 AST (SGOT) 50 (H) 15 - 37 U/L Alk. phosphatase 163 (H) 45 - 117 U/L Protein, total 6.0 (L) 6.4 - 8.2 g/dL Albumin 2.2 (L) 3.5 - 5.0 g/dL Globulin 3.8 2.0 - 4.0 g/dL A-G Ratio 0.6 (L) 1.1 - 2.2 C REACTIVE PROTEIN, QT Collection Time: 06/18/20  5:01 AM  
Result Value Ref Range C-Reactive protein 5.80 (H) 0.00 - 0.60 mg/dL LD Collection Time: 06/18/20  5:01 AM  
Result Value Ref Range  (H) 81 - 246 U/L  
NT-PRO BNP Collection Time: 06/18/20  5:01 AM  
Result Value Ref Range NT pro-BNP 2,600 (H) <450 PG/ML  
PHOSPHORUS Collection Time: 06/18/20  5:01 AM  
Result Value Ref Range Phosphorus 4.3 2.6 - 4.7 MG/DL  
SAMPLES BEING HELD Collection Time: 06/18/20  5:01 AM  
Result Value Ref Range SAMPLES BEING HELD 3BLU   
 COMMENT   Add-on orders for these samples will be processed based on acceptable specimen integrity and analyte stability, which may vary by analyte. Imaging: 
I have personally reviewed the patients radiographs and have reviewed the reports: 
CXR (6/8/2020): There are diffuse patchy bilateral infiltrates, increased in density as compared to the prior study. CXR (6/12/2020): There is patchy bilateral upper lobe airspace disease that has increased as compared to the prior study. CXR 6/16:  Slight improvement in bilateral infiltration Kathrine Le NP

## 2020-06-18 NOTE — PROGRESS NOTES
Problem: Airway Clearance - Ineffective Goal: Achieve or maintain patent airway Outcome: Progressing Towards Goal 
  
Problem: Falls - Risk of 
Goal: *Absence of Falls Description: Document Luis Felipe Cease Fall Risk and appropriate interventions in the flowsheet. Outcome: Progressing Towards Goal 
Note: Fall Risk Interventions: 
Mobility Interventions: Patient to call before getting OOB Mentation Interventions: Bed/chair exit alarm Medication Interventions: Bed/chair exit alarm Elimination Interventions: Patient to call for help with toileting needs History of Falls Interventions: Bed/chair exit alarm Problem: Risk for Spread of Infection Goal: Prevent transmission of infectious organism to others Description: Prevent the transmission of infectious organisms to other patients, staff members, and visitors.  
Outcome: Progressing Towards Goal

## 2020-06-18 NOTE — PROGRESS NOTES
Provided  services remotely to dietician Arnaldo Arboleda during a follow up call. [address] 
[phone]

## 2020-06-18 NOTE — PROGRESS NOTES
Suresh Hutchinson [facility] 79 
1555 [facility], [place], [address] [place] [address] 
[phone] Medical Progress Note NAME: Ricco Morgan :  1945 MRM:  [id number] Date of service: 2020  7:56 AM 
 
  
Assessment and Plan: 1. Pneumonia due to COVID-19 virus: CXR on : Unchanged multilobar airspace disease on CXR. Continue vitamin C, zinc, statin. Completed azithromycin and Remdesivir. Follow inflammatory markers. d-dimer is rising and is on on IV heparin gtt high risk of PE. BLE duplex negative for DVT. Started on dexamethasone on  
  
2. Acute respiratory failure with hypoxia: due to COVID-19. Supplemental O2 PRN, wean as tolerated. Monitor O2 requirement closely 
  
3. LESLEY (acute kidney injury) /CKD: Baseline renal function unknown, suspect at least CKD3. Worsening. Monitor BMP closely. Nephrology following. 
  
4. HTN (hypertension), benign: BP controlled. Continue Norvasc, IV hydralazine PRN 5.  DM( new). A1c is 7.2. started on NPH and SSI for now. Subjective: Chief Complaint[de-identified] Patient was seen and examined as a follow up for COVID 19. Chart was reviewed. c/o cough and SOB 
 
ROS: 
(bold if positive, if negative) Cough SOBTolerating PT  Tolerating Diet Objective:  
 
Last 24hrs VS reviewed since prior progress note. Most recent are: 
 
Visit Vitals /67 (BP 1 Location: Right arm, BP Patient Position: At rest) Pulse 92 Temp 97.7 °F (36.5 °C) Resp 16 Ht 4' 11\" (1.499 m) Wt 78 kg (171 lb 15.3 oz) SpO2 92% BMI 34.73 kg/m² SpO2 Readings from Last 6 Encounters:  
20 92% O2 Flow Rate (L/min): 2 l/min Intake/Output Summary (Last 24 hours) at 2020 1121 Last data filed at 2020 1450 Gross per 24 hour Intake  Output 500 ml Net -500 ml Physical Exam: 
 
Gen:  Well-developed, well-nourished, in no acute distress HEENT:  Pink conjunctivae, PERRL, hearing intact to voice, moist mucous membranes Neck:  Supple, without masses, thyroid non-tender Resp:  No accessory muscle use, clear breath sounds without wheezes rales or rhonchi 
Card:  No murmurs, normal S1, S2 without thrills, bruits or peripheral edema Abd:  Soft, non-tender, non-distended, normoactive bowel sounds are present, no palpable organomegaly and no detectable hernias Lymph:  No cervical or inguinal adenopathy Musc:  No cyanosis or clubbing Skin:  No rashes or ulcers, skin turgor is good Neuro:  Cranial nerves are grossly intact, no focal motor weakness, follows commands appropriately Psych:  Good insight, oriented to person, place and time, alert 
__________________________________________________________________ Medications Reviewed: (see below) Medications:  
 
Current Facility-Administered Medications Medication Dose Route Frequency  polyethylene glycol (MIRALAX) packet 17 g  17 g Oral BID  senna-docusate (PERICOLACE) 8.6-50 mg per tablet 1 Tab  1 Tab Oral DAILY  dexAMETHasone (DECADRON) tablet 10 mg  10 mg Oral DAILY  heparin 25,000 units in D5W 250 ml infusion  18-36 Units/kg/hr IntraVENous TITRATE  amLODIPine (NORVASC) tablet 5 mg  5 mg Oral DAILY  phenyleph-min oil-petrolatum (PREPARATION H) 0.25-14-74.9 % ointment   PeriANAL QID PRN  
 aspirin chewable tablet 324 mg  324 mg Oral DAILY  sodium chloride (NS) flush 5-40 mL  5-40 mL IntraVENous Q8H  
 sodium chloride (NS) flush 5-40 mL  5-40 mL IntraVENous PRN  
 acetaminophen (TYLENOL) tablet 650 mg  650 mg Oral Q4H PRN  
 naloxone (NARCAN) injection 0.4 mg  0.4 mg IntraVENous PRN  
 ondansetron (ZOFRAN) injection 4 mg  4 mg IntraVENous Q4H PRN  
 morphine injection 2 mg  2 mg IntraVENous Q4H PRN  
 ascorbic acid (vitamin C) (VITAMIN C) tablet 500 mg  500 mg Oral BID  zinc sulfate (ZINCATE) 220 (50) mg capsule 1 Cap  1 Cap Oral DAILY  atorvastatin (LIPITOR) tablet 40 mg  40 mg Oral DAILY  guaiFENesin ER (MUCINEX) tablet 1,200 mg  1,200 mg Oral BID  famotidine (PEPCID) tablet 20 mg  20 mg Oral QPM  
  
 
Lab Data Reviewed: (see below) Lab Review:  
 
Recent Labs  
  06/18/20 
0501 06/17/20 
4050 WBC 17.4* 11.3* HGB 8.3* 8.8* HCT 26.4* 27.9*  
 320 Recent Labs  
  06/18/20 
0501 06/17/20 
9757 06/16/20 
0934 06/16/20 
0457  133* 136  --   
K 4.7 5.1 4.9  --   
* 105 104  --   
CO2 20* 20* 22  --   
* 273* 154*  --   
BUN 69* 62* 54*  --   
CREA 4.02* 4.20* 4.18*  --   
CA 8.5 8.7 8.6  --   
PHOS 4.3 4.4 3.9  --   
ALB 2.2* 2.2* 2.2*  --   
TBILI 0.6 0.6  --   --   
ALT 27 29 27  --   
INR 1.2* 1.2*  --  1.2* Lab Results Component Value Date/Time Glucose (POC) 70 06/06/2020 06:13 AM  
 Glucose (POC) 73 06/05/2020 07:21 PM  
 
No results for input(s): PH, PCO2, PO2, HCO3, FIO2 in the last 72 hours. Recent Labs  
  06/18/20 
0501 06/17/20 
0433 06/16/20 
0457 INR 1.2* 1.2* 1.2* All Micro Results Procedure Component Value Units Date/Time LEGIONELLA PNEUMOPHILA AG, URINE [[id number]] Collected:  06/05/20 1405 Order Status:  Completed Specimen:  Urine Updated:  06/08/20 1337 Source URINE     
  L pneumophila S1 Ag, urine Negative Comment: (NOTE) Presumptive negative for L. pneumophila serogroup 1 antigen in urine, 
suggesting no recent or current infection. Legionnaires' disease 
cannot be ruled out since other serogroups and species may also 
cause disease. Performed At: [address] [id number] Vineet Mir MD [id number] CULTURE, RESPIRATORY/SPUTUM/BRONCH Elvis Hernandez [[id number]] Collected:  06/06/20 1022 Order Status:  Canceled Specimen:  Sputum MYCOPLASMA AB, IGM [[id number]] Collected:  06/05/20 1839 Order Status:  Completed Specimen:  Serum Updated:  06/06/20 0017   Mycoplasma Ab, IgM NONREACTIVE     
 RESPIRATORY PANEL,PCR,NASOPHARYNGEAL [[id number]] Collected:  06/05/20 1007 Order Status:  Completed Specimen:  Nasopharyngeal Updated:  06/05/20 1554 Adenovirus Not detected Coronavirus 229E Not detected Coronavirus HKU1 Not detected Coronavirus CVNL63 Not detected Coronavirus OC43 Not detected Metapneumovirus Not detected Rhinovirus and Enterovirus Not detected Influenza A Not detected Influenza A, subtype H1 Not detected Influenza A, subtype H3 Not detected INFLUENZA A H1N1 PCR Not detected Influenza B Not detected Parainfluenza 1 Not detected Parainfluenza 2 Not detected Parainfluenza 3 Not detected Parainfluenza virus 4 Not detected RSV by PCR Not detected B. parapertussis, PCR Not detected Bordetella pertussis - PCR Not detected Chlamydophila pneumoniae DNA, QL, PCR Not detected Mycoplasma pneumoniae DNA, QL, PCR Not detected I have reviewed notes of prior 24hr. Other pertinent lab: Total time spent with patient: 28 Care Plan discussed with: Patient, Nursing Staff and >50% of time spent in counseling and coordination of care Discussed:  Care Plan Prophylaxis:  Lovenox Disposition:  Home w/Family 
        
___________________________________________________ Attending Physician: Kang Alarcon MD

## 2020-06-18 NOTE — PROGRESS NOTES
Suresh Hutchinson [facility] [place] [facility] Beola Pale YOB: 1945 Assessment & Plan:  
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 
HL 
 
Rec: 
Renal function improving Oxygenation better No need for RRT Continue low K diet and watch labs Subjective:  
CC: F/u LESLEY 
HPI: Creat decreasing. Good uop. Oxygenation better. Juan Thompson Current Facility-Administered Medications Medication Dose Route Frequency  polyethylene glycol (MIRALAX) packet 17 g  17 g Oral BID  senna-docusate (PERICOLACE) 8.6-50 mg per tablet 1 Tab  1 Tab Oral DAILY  dexAMETHasone (DECADRON) tablet 10 mg  10 mg Oral DAILY  heparin 25,000 units in D5W 250 ml infusion  18-36 Units/kg/hr IntraVENous TITRATE  amLODIPine (NORVASC) tablet 5 mg  5 mg Oral DAILY  phenyleph-min oil-petrolatum (PREPARATION H) 0.25-14-74.9 % ointment   PeriANAL QID PRN  
 aspirin chewable tablet 324 mg  324 mg Oral DAILY  sodium chloride (NS) flush 5-40 mL  5-40 mL IntraVENous Q8H  
 sodium chloride (NS) flush 5-40 mL  5-40 mL IntraVENous PRN  
 acetaminophen (TYLENOL) tablet 650 mg  650 mg Oral Q4H PRN  
 naloxone (NARCAN) injection 0.4 mg  0.4 mg IntraVENous PRN  
 ondansetron (ZOFRAN) injection 4 mg  4 mg IntraVENous Q4H PRN  
 morphine injection 2 mg  2 mg IntraVENous Q4H PRN  
 ascorbic acid (vitamin C) (VITAMIN C) tablet 500 mg  500 mg Oral BID  zinc sulfate (ZINCATE) 220 (50) mg capsule 1 Cap  1 Cap Oral DAILY  atorvastatin (LIPITOR) tablet 40 mg  40 mg Oral DAILY  guaiFENesin ER (MUCINEX) tablet 1,200 mg  1,200 mg Oral BID  famotidine (PEPCID) tablet 20 mg  20 mg Oral QPM  
  
 
 
Objective:  
 
Vitals: 
Blood pressure 141/67, pulse 92, temperature 97.7 °F (36.5 °C), resp. rate 16, height 4' 11\" (1.499 m), weight 78 kg (171 lb 15.3 oz), SpO2 92 %. Temp (24hrs), Av.2 °F (36.8 °C), Min:97.7 °F (36.5 °C), Max:98.5 °F (36.9 °C) Intake and Output: No intake/output data recorded. 06/16 1901 - 06/18 0700 In: -  
Out: 800 [Urine:800] Physical Exam:              
IN person exam deferred due to COVID isolation status ECG/rhythm: 
 
Data Review No results for input(s): TNIPOC in the last 72 hours. No lab exists for component: ITNL No results for input(s): CPK, CKMB, TROIQ in the last 72 hours. Recent Labs  
  06/18/20 
0501 06/17/20 
0433 06/16/20 
6472  133* 136  
K 4.7 5.1 4.9 * 105 104 CO2 20* 20* 22 BUN 69* 62* 54* CREA 4.02* 4.20* 4.18* * 273* 154* PHOS 4.3 4.4 3.9 CA 8.5 8.7 8.6 ALB 2.2* 2.2* 2.2* WBC 17.4* 11.3*  --   
HGB 8.3* 8.8*  --   
HCT 26.4* 27.9*  --   
 320  --   
  
Recent Labs  
  06/18/20 
0501 06/17/20 
2126 06/17/20 
1357 06/17/20 
0433  06/16/20 
0457 INR 1.2*  --   --  1.2*  --  1.2* PTP 12.3*  --   --  12.4*  --  12.4* APTT 53.7* 59.4* 51.5* 50.5*   < > 64.6*  
 < > = values in this interval not displayed. Needs: urine analysis, urine sodium, protein and creatinine Lab Results Component Value Date/Time Sodium,urine random 80 06/07/2020 09:03 PM  
 Creatinine, urine 59.00 06/07/2020 09:03 PM  
 Creatinine, urine 62.10 06/07/2020 09:03 PM  
 
 
 
 
: Diana Jacobson MD 
6/18/2020 [facility]: 
[email or web address] [place] office: 
[address], [address] [place] [address], [id number] [place] [address] Phone: [phone] Fax :     [phone] [place] office: 
[address] [address] [address] [facility], [address] Phone - [phone] Fax - [phone]

## 2020-06-18 NOTE — PROGRESS NOTES
Nutrition Assessment: 
 
RECOMMENDATIONS/INTERVENTION(S):  
1. Continue CHO consistent diet. 2. Continue Ensure Clear x1/d (240 kcal, 8 gm protein) and scheduled snack to promote adequate intakes. 3. Monitor PO intakes, GI function, labs, BG, and wt trends. ASSESSMENT:  
6/18: F/u. COVID-19+, spoke with pt over phone using interpretor. Pt reports improving appetite/ intakes. Pt was able to eat beans, soup, and juice for lunch. Pt has been drinking Ensure Clear and eating scheduled snack, will continue. , 273, 284. Pt denies n/v. LBM 6/18. Labs - BUN 69 H, Cr 4.02 H, Hgb A1c 7.2 (6/17/20). Meds - ascorbic acid, dexamethasone, famotidine, heparin, polyethylene glycol, senna-docusate, zinc sulfate. 6/15: F/u and MD consult for general nutrition management. COVID-19+. Spoke with pt using phone  services. Pt with poor appetite, not very hungry. Pt was able to eat chicken noodle soup and salad for lunch. Pt reports she did not eat breakfast this morning, did not receive tray. Meal intake per EMR shows 25-50% intakes. Pt does not like Glucerna shake, will discontinue. Pt would prefer jello over ensure pudding - will change in order. Pt typically eats 3 smaller meals at home during the day, snacking not frequent. Pt typically has oatmeal and bread for breakfast and dinner and typically has chicken/ fish with soup for lunch. Pt hungry at this time, will send scheduled snack. No n/v. BM this AM. Skin intact. Labs - BUN 46 H, Cr 3.58 H, Ca 8.1 L. Meds - ascorbic acid, famotidine, heparin, zinc sulfate. 6/10: 66 y/o F admitted with PNA d/t COVID-19. Pt seen for LOS. PMH - HTN. COVID-19+, spoke with RN. Pt with 50% intakes today. Meal intake per EMR shows 25-75% intakes. Unable to obtain nutritional Hx at this time.  lb. BMI 34.7 c/w obesity. Hgb A1c 8.8 (6/10/19). BG/, 139, 110, 126. No note of GI distress. LBM 6/9. Skin intact.  Labs - BUN 24 H, Cr 2.40 H, Ca 7.4 L, Phos 1.7 L. Meds - ascorbic acid, famotidine, heparin, phosphorus, zinc sulfate, furosemide. Diet Order: Consistent carb 
% Eaten:   
Patient Vitals for the past 72 hrs: 
 % Diet Eaten 06/16/20 1818 0 % 06/16/20 1457 70 % 06/16/20 1051 20 % Pertinent Medications: [x] Reviewed Labs: [x] Reviewed Anthropometrics: Height: 4' 11\" (149.9 cm) Weight: 78 kg (171 lb 15.3 oz) IBW (%IBW):   ( ) UBW (%UBW):   (  %) BMI: Body mass index is 34.73 kg/m². This BMI is indicative of: 
 [] Underweight    [] Normal    [] Overweight    [x]  Obesity    []  Extreme Obesity (BMI>40) Estimated Nutrition Needs (Based on): 1528 Kcals/day(REE 1176 x 1.3) , 80 g(78 - 94 gm (1.0 - 1.2 gm/kg)) Protein Carbohydrate: At Least 130 g/day  Fluids: 1528 mL/day (1 ml/kcal) Last BM: 6/18   [x]Active     []Hyperactive  []Hypoactive       [] Absent   BS Skin:    [x] Intact   [] Incision  [] Breakdown   [] DTI   [] Tears/Excoriation/Abrasion  []Edema [] Other: Wt Readings from Last 30 Encounters:  
06/06/20 78 kg (171 lb 15.3 oz) NUTRITION DIAGNOSES:  
Problem:  Inadequate energy intake Etiology: related to decreased ability to consume sufficient energy Signs/Symptoms: as evidenced by intakes meeting <EENs per EMR and report 6/15: Nutrition Dx continues. 6/18: Nutrition Dx improving. NUTRITION INTERVENTIONS: 
Meals/Snacks: General/healthful diet   Supplements: Commercial supplement GOAL:  
PO intakes >50% + scheduled snack + ONS within 5-7 days Cultural, Hoahaoism, or Ethnic Dietary Needs: None EDUCATION & DISCHARGE NEEDS:  
 [x] None Identified 
 [] Identified and Education Provided/Documented 
 [] Identified and Pt declined/was not appropriate [x] Interdisciplinary Care Plan Reviewed/Documented  
 [x] Discharge Needs:    CHO consistent diet 
 [] No Nutrition Related Discharge Needs NUTRITION RISK:  
Pt Is At Nutrition Risk  [x] No Nutrition Risk Identified  [] PT SEEN FOR:  
 []  MD Consult: []Calorie Count []Diabetic Diet Education []Diet Education []Electrolyte Management []General Nutrition Management and Supplements []Management of Tube Feeding []TPN Recommendations []  RN Referral:  []MST score >=2 
   []Enteral/Parenteral Nutrition PTA []Pregnant: Gestational DM or Multigestation  
              [] Pressure Ulcer 
 
[]  Low BMI      []  Length of Stay       [] Dysphagia Diet         [] Ventilator [x]  Follow-up Previous Recommendations: 
 [x] Implemented          [] Not Implemented          [] Not Applicable Previous Goal: 
 [x] Met              [x] Progressing Towards Goal              [] Not Progressing Towards Goal   [] Not Applicable Abner Lopez RDN Pager [phone] Phone [phone]

## 2020-06-19 NOTE — PROGRESS NOTES
Suresh Hutchinson [facility] 79 [facility] YOB: 1945 Assessment & Plan:  
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 
HL 
 
Rec: 
LESLEY resolving No need for RRT Watch labs Subjective:  
CC: F/u LESLEY 
HPI: Renal function improving nicely Current Facility-Administered Medications Medication Dose Route Frequency  polyethylene glycol (MIRALAX) packet 17 g  17 g Oral BID  senna-docusate (PERICOLACE) 8.6-50 mg per tablet 1 Tab  1 Tab Oral DAILY  insulin lispro (HUMALOG) injection   SubCUTAneous AC&HS  
 glucose chewable tablet 16 g  4 Tab Oral PRN  
 dextrose (D50W) injection syrg 12.5-25 g  12.5-25 g IntraVENous PRN  
 glucagon (GLUCAGEN) injection 1 mg  1 mg IntraMUSCular PRN  
 insulin NPH (NOVOLIN N, HUMULIN N) injection 12 Units  12 Units SubCUTAneous ACB&D  
 dexAMETHasone (DECADRON) tablet 10 mg  10 mg Oral DAILY  heparin 25,000 units in D5W 250 ml infusion  18-36 Units/kg/hr IntraVENous TITRATE  amLODIPine (NORVASC) tablet 5 mg  5 mg Oral DAILY  phenyleph-min oil-petrolatum (PREPARATION H) 0.25-14-74.9 % ointment   PeriANAL QID PRN  
 aspirin chewable tablet 324 mg  324 mg Oral DAILY  sodium chloride (NS) flush 5-40 mL  5-40 mL IntraVENous Q8H  
 sodium chloride (NS) flush 5-40 mL  5-40 mL IntraVENous PRN  
 acetaminophen (TYLENOL) tablet 650 mg  650 mg Oral Q4H PRN  
 naloxone (NARCAN) injection 0.4 mg  0.4 mg IntraVENous PRN  
 ondansetron (ZOFRAN) injection 4 mg  4 mg IntraVENous Q4H PRN  
 morphine injection 2 mg  2 mg IntraVENous Q4H PRN  
 ascorbic acid (vitamin C) (VITAMIN C) tablet 500 mg  500 mg Oral BID  zinc sulfate (ZINCATE) 220 (50) mg capsule 1 Cap  1 Cap Oral DAILY  atorvastatin (LIPITOR) tablet 40 mg  40 mg Oral DAILY  guaiFENesin ER (MUCINEX) tablet 1,200 mg  1,200 mg Oral BID  famotidine (PEPCID) tablet 20 mg  20 mg Oral QPM  
  
 
 
Objective: Vitals: 
Blood pressure 132/83, pulse 76, temperature 97.7 °F (36.5 °C), resp. rate 16, height 4' 11\" (1.499 m), weight 78 kg (171 lb 15.3 oz), SpO2 95 %. Temp (24hrs), Av °F (36.7 °C), Min:97.6 °F (36.4 °C), Max:98.6 °F (37 °C) Intake and Output: 
No intake/output data recorded.  190 -  0700 In: 125 [P.O.:125] Out: 200 [Urine:200] Physical Exam:              
IN person exam deferred due to COVID isolation status ECG/rhythm: 
 
Data Review No results for input(s): TNIPOC in the last 72 hours. No lab exists for component: ITNL No results for input(s): CPK, CKMB, TROIQ in the last 72 hours. Recent Labs  
  20 
0435 20 
0501 20 
1678  136 133* K 4.6 4.7 5.1 * 109* 105 CO2 20* 20* 20* BUN 75* 69* 62* CREA 3.74* 4.02* 4.20* * 284* 273* PHOS 4.3 4.3 4.4  
CA 8.4* 8.5 8.7 ALB 2.4* 2.2* 2.2* WBC 16.1* 17.4* 11.3* HGB 8.9* 8.3* 8.8* HCT 27.8* 26.4* 27.9*  
 309 320 Recent Labs  
  20 
0435 20 
1837 20 
1142 20 
0501  20 
1819 INR 1.1  --   --  1.2*  --  1.2* PTP 11.7*  --   --  12.3*  --  12.4* APTT 45.3* 50.7* 79.5* 53.7*   < > 50.5*  
 < > = values in this interval not displayed. Needs: urine analysis, urine sodium, protein and creatinine Lab Results Component Value Date/Time Sodium,urine random 80 2020 09:03 PM  
 Creatinine, urine 59.00 2020 09:03 PM  
 Creatinine, urine 62.10 2020 09:03 PM  
 
 
 
 
: Teri Padilla MD 
2020 [facility]: 
[email or web address] [place] office: 
[address], [address] [place], [address] [place] [address] Phone: [phone] Fax :     [phone] [place] office: 
200 [facility], [address] Phone - [phone] Fax - [phone]

## 2020-06-19 NOTE — PROGRESS NOTES
Dr. Conor Grider notified of patient falling due to trying to get out of the bed because she was dizzy. Patient has an abrasion on her left side of her face, left knee, and a small bruise to the left knee. Patient is currently complaining of a small headache.  Patient cleaned up and bathed by Andres De La Rosa RN and Malika Wilson RN

## 2020-06-19 NOTE — PROGRESS NOTES
6/19/2020 
5:00 PM 
RUR:  20% Risk Level: []Low [x]Moderate []High Value-based purchasing: [] Yes [x] No 
Bundle patient: [] Yes [x] No 
 Specify:  
 
Transition of care plan: 
1. COVID 19. Pt requiring 1L O2. PT/OT treating. 2. Home with family assistance when medically stable. Pt does not qualify for Medicaid. 3. Outpatient follow-up. 4. Pt's family to transport.

## 2020-06-19 NOTE — PROGRESS NOTES
1730-TRANSFER - IN REPORT: 
 
Verbal report received from Ryann Nash RN(name) on Radha Downs  being received from loor(unit) for change in patient condition(Increase oxygen demand, fall) Report consisted of patients Situation, Background, Assessment and  
Recommendations(SBAR). Information from the following report(s) SBAR, Kardex, ED Summary, OR Summary, Procedure Summary, MAR, Accordion, Recent Results, Med Rec Status and Cardiac Rhythm Sinus Tach was reviewed with the receiving nurse. Opportunity for questions and clarification was provided. Assessment completed upon patients arrival to unit and care assumed. Received a phone call from Pharm stating patient needed to be restarted in Hep gtt when arrived to unit. 1855-Patient arrives on unit with sitter. Patient very restless. Turning from side to side. BP 95/70. . Patient responding to voice. Able to say \"Noemy\" Asked if she was in pain in [place]ese and patient said \"No.\" Skin cool to the touch. Will continue to monitor. 1920-Bedside and Verbal shift change report given to Mikey Lima RN (oncoming nurse) by Carroll Poole RN (offgoing nurse). Report included the following information SBAR, Kardex, ED Summary, Procedure Summary, Intake/Output, MAR, Accordion, Recent Results, Med Rec Status and Cardiac Rhythm Sinus Tach. 1936-Rapid response called.

## 2020-06-19 NOTE — PROGRESS NOTES
Problem: Risk for Spread of Infection Goal: Prevent transmission of infectious organism to others Description: Prevent the transmission of infectious organisms to other patients, staff members, and visitors. Outcome: Progressing Towards Goal 
  
Problem: Patient Education:  Go to Education Activity Goal: Patient/Family Education Outcome: Progressing Towards Goal

## 2020-06-19 NOTE — PROGRESS NOTES
Post Fall Documentation Ramya Deep unwitnessed fall occurred on 6/19/2020 at 1440. The answers to the following questions summarize the fall: In the patient's own words: 
· What were you attempting to do when you fell? \"I felt dizzy and just fell over\" · Do you know why you fell? \"I just don't know why. I have not felt like this before\" · Do you have any pain/discomfort or any other complaints? \"I have pain around my waist\" · Which part of your body made contact with the floor or other object? Buttocks, knee, left arm Nurse: 
? Was this an assisted fall? no 
? Was fall witnessed? No 
? If witnessed, what part of the body made contact with the floor or other object? Unwitnessed ? Patients mental status after the fall/when found: Alert and oriented ? Any apparent injury:  Bleeding from mole on side of face ? Immediate interventions for injury/suspected injury? Stopped bleeding ? Patient assisted back to bed? Assist X2 
? Name of provider notified and time, any comments? Dr. Ankush Eason at [phone], order placed for CT head. ? Name of family member notified and time: 
 
 
Immediate VS and physical assessment documented in flow sheets. Neuro assessment every hour x 4 (for potential head injury or unwitnessed fall) documented in flow sheets.  
 
 
Ulices Baxter RN

## 2020-06-19 NOTE — CDMP QUERY
Patient admitted with Pneumonia due to COVID-19 virus. Pt noted to have diabetes documented on 6/18 \"DM( new). A1c is 7.2. started on NPH and SSI for now. \" Please document in progress notes and discharge summary the type of DM: 
 
      => DM type II with hyperglycemia  
=> DM type I with Hyperglycemia  
=> DM due to other, please specify 
=> Clinically unable to determine The medical record reflects the following: 
  Risk Factors: 66 yo F admitted with PNA due to Coivd 19 virus Clinical Indicators: 6/18  267  245 316  A1C 7.2  
   6/18 PN states \"DM new\" Treatment:  NPH and SSI Thank you for your time [facility] RN/BSN, CCDS Desk:   [phone] Other:  [phone]

## 2020-06-19 NOTE — PROGRESS NOTES
Report rec'd and care assumed. To room for assessment. Pt is hypotensive SBP 60's, agonal RR 4-6/m, and unresponsive to pain. Sat 62% on 7l/m NC.  100% fio2 Ambu bagging and Code called. Hospitalist in room and transferred to ICU 15 in critical condition.

## 2020-06-19 NOTE — CONSULTS
Orthopedic History and Physical  
 
Patient: Shelby Hernandez MRN: [id number]  SSN: [id number] YOB: 1945  Age: 67 y.o. Sex: female INFORMATION GATHERED FROM CHART; WILL AWAIT CT BEFORE ENTERING ROOM DUE TO COVID POSITIVE Subjective:  
 
Patient is a 67 y.o.  female who complains of left hip pain. Pt got up out of bed this afternoon unassisted, felt dizzy and fell to the ground, striking her left knee and head. Head CT done, negative for acute traumatic injury. Pt then started to complain of left hip pain. Radiographs reveal left possible impaction femoral neck fracture. Ordered CT to confirm hip fracture. Patient Active Problem List  
 Diagnosis Date Noted  Pneumonia due to COVID-19 virus 06/05/2020  LESLEY (acute kidney injury) ([facility] [address] 06/05/2020  
 HTN (hypertension), benign 06/05/2020  
 SOB (shortness of breath) 06/05/2020 Past Medical History:  
Diagnosis Date  
 HTN (hypertension), benign No past surgical history on file. Prior to Admission medications Medication Sig Start Date End Date Taking? Authorizing Provider  
lisinopriL (PRINIVIL, ZESTRIL) 5 mg tablet Take 5 mg by mouth daily. Yes Provider, Historical  
predniSONE (DELTASONE) 5 mg tablet Take 10 mg by mouth daily. 6/2/20 7/2/20 Yes Provider, Historical  
 
Current Facility-Administered Medications Medication Dose Route Frequency  heparin 25,000 units in D5W 250 ml infusion  4-36 Units/kg/hr IntraVENous TITRATE  [START ON 6/20/2020] insulin NPH (NOVOLIN N, HUMULIN N) injection 30 Units  30 Units SubCUTAneous ACB&D  
 oxyCODONE-acetaminophen (PERCOCET) 5-325 mg per tablet 2 Tab  2 Tab Oral Q6H PRN  
 0.9% sodium chloride infusion  100 mL/hr IntraVENous CONTINUOUS  
 polyethylene glycol (MIRALAX) packet 17 g  17 g Oral BID  senna-docusate (PERICOLACE) 8.6-50 mg per tablet 1 Tab  1 Tab Oral DAILY  insulin lispro (HUMALOG) injection   SubCUTAneous AC&HS  
  glucose chewable tablet 16 g  4 Tab Oral PRN  
 dextrose (D50W) injection syrg 12.5-25 g  12.5-25 g IntraVENous PRN  
 glucagon (GLUCAGEN) injection 1 mg  1 mg IntraMUSCular PRN  
 dexAMETHasone (DECADRON) tablet 10 mg  10 mg Oral DAILY  amLODIPine (NORVASC) tablet 5 mg  5 mg Oral DAILY  phenyleph-min oil-petrolatum (PREPARATION H) 0.25-14-74.9 % ointment   PeriANAL QID PRN  
 aspirin chewable tablet 324 mg  324 mg Oral DAILY  sodium chloride (NS) flush 5-40 mL  5-40 mL IntraVENous Q8H  
 sodium chloride (NS) flush 5-40 mL  5-40 mL IntraVENous PRN  
 acetaminophen (TYLENOL) tablet 650 mg  650 mg Oral Q4H PRN  
 naloxone (NARCAN) injection 0.4 mg  0.4 mg IntraVENous PRN  
 ondansetron (ZOFRAN) injection 4 mg  4 mg IntraVENous Q4H PRN  
 morphine injection 2 mg  2 mg IntraVENous Q4H PRN  
 ascorbic acid (vitamin C) (VITAMIN C) tablet 500 mg  500 mg Oral BID  zinc sulfate (ZINCATE) 220 (50) mg capsule 1 Cap  1 Cap Oral DAILY  atorvastatin (LIPITOR) tablet 40 mg  40 mg Oral DAILY  guaiFENesin ER (MUCINEX) tablet 1,200 mg  1,200 mg Oral BID  famotidine (PEPCID) tablet 20 mg  20 mg Oral QPM  
  
No Known Allergies Social History Tobacco Use  Smoking status: Never Smoker  Smokeless tobacco: Never Used Substance Use Topics  Alcohol use: Never Frequency: Never Family History Problem Relation Age of Onset  Diabetes Neg Hx Review of Systems A comprehensive review of systems was negative except for that written in the HPI. Objective:  
 
Patient Vitals for the past 1 hrs: 
 BP Pulse Resp SpO2  
20 1721 120/58 (!) 104 26 96 % Temp (24hrs), Av.8 °F (36.6 °C), Min:97.5 °F (36.4 °C), Max:98.3 °F (36.8 °C) EXAM: PER NURSE: HAVE NOT PERSONALLY EXAMINED PT Physical Exam:  
 
 
 
 
 
Imaging Review FINDINGS:  AP  view  of  the  pelvis  and  a  frogleg  lateral  view  of the  left  Hip demonstrate  a  linear  area  of  sclerosis  projecting  over  the  left  femoral  neck potentially  representing  an  impaction  fracture. Degenerative  changes  are  seen  In the  hip  joints  Bilaterally. < 
Potential  left  femoral  neck  impaction  fracture. Cross-sectional  imaging  may  Be helpful  for  further  evaluation. Matias Hilt Labs:  
Recent Results (from the past 12 hour(s)) GLUCOSE, POC Collection Time: 06/19/20  6:19 AM  
Result Value Ref Range Glucose (POC) 245 (H) 65 - 100 mg/dL Performed by Shiva Khoury (PCT) GLUCOSE, POC Collection Time: 06/19/20 11:21 AM  
Result Value Ref Range Glucose (POC) 316 (H) 65 - 100 mg/dL Performed by WilmerSweetie High Tonio (PCT) PTT Collection Time: 06/19/20 12:20 PM  
Result Value Ref Range aPTT 93.4 (HH) 22.1 - 32.0 sec  
 aPTT, therapeutic range     58.0 - 77.0 SECS  
GLUCOSE, POC Collection Time: 06/19/20  4:46 PM  
Result Value Ref Range Glucose (POC) 565 (H) 65 - 100 mg/dL Performed by Blima Tonio (PCT) GLUCOSE, POC Collection Time: 06/19/20  4:48 PM  
Result Value Ref Range Glucose (POC) 591 (H) 65 - 100 mg/dL Performed by Blima Tonio (PCT) Assessment:  
 
Patient Active Problem List  
 Diagnosis Date Noted  Pneumonia due to COVID-19 virus 06/05/2020  LESLEY (acute kidney injury) ([facility] [address] 06/05/2020  
 HTN (hypertension), benign 06/05/2020  
 SOB (shortness of breath) 06/05/2020 Plan:  
Potential left femoral neck fracture after unwitnessed GLF in hospital room CT of left hip ordered to confirm fracture To discuss with medical team clearance for surgical intervention hopefully tomorrow. Will follow up after CT complete. Discussed case with Dr. Mesfin Mclaughlin and he agrees with above plan. Cathy Packer NP Orthopaedic Surgery Nurse Practitioner

## 2020-06-19 NOTE — PROGRESS NOTES
[facility] 
[address], [address], [id number] [place] [address] 
[phone] Medical Progress Note NAME: Chiquita Mccoy :  1945 MRM:  [id number] Date of service: 2020  7:56 AM 
 
  
Assessment and Plan: 1. Pneumonia due to COVID-19 virus: CXR on : Unchanged multilobar airspace disease on CXR. Continue vitamin C, zinc, statin. Completed azithromycin and Remdesivir. Follow inflammatory markers. d-dimer is rising and is on IV heparin gtt high risk of PE. BLE duplex negative for DVT. Started on dexamethasone on  
  
2. Acute respiratory failure with hypoxia: due to COVID-19. Supplemental O2 PRN, wean as tolerated. Monitor O2 requirement closely 
  
3. LESLEY (acute kidney injury) /CKD: Baseline renal function unknown, suspect at least CKD3. Improving. Monitor BMP closely. Nephrology following. 
  
4. HTN (hypertension), benign: BP controlled. Continue Norvasc, IV hydralazine PRN 5.  DM type II with hyperglycemia( new). A1c is 7.2. started on NPH and SSI for now. 6.  Transaminitis. likely due to COVID-19. Continue to monitor. Addendum: 05:30 PM 
Pt had a fall earlier and CT of the head is unremarkable, but has fracture of the LF femoral neck fracture. Pain management and consult orthopedics. RRT was called due to hypoxia and tachycardia requiring 7LPM of O2. Check stat CXR and transfer to stepdown. Subjective: Chief Complaint[de-identified] Patient was seen and examined as a follow up for COVID 19. Chart was reviewed. cough and SOB are a little better ROS: 
(bold if positive, if negative) Cough SOBTolerating PT  Tolerating Diet Objective:  
 
Last 24hrs VS reviewed since prior progress note. Most recent are: 
 
Visit Vitals /74 (BP 1 Location: Left arm, BP Patient Position: At rest) Pulse 87 Temp 97.6 °F (36.4 °C) Resp 16 Ht 4' 11\" (1.499 m) Wt 78 kg (171 lb 15.3 oz) SpO2 95% BMI 34.73 kg/m² SpO2 Readings from Last 6 Encounters:  
06/19/20 95% O2 Flow Rate (L/min): 1 l/min Intake/Output Summary (Last 24 hours) at 6/19/2020 0732 Last data filed at 6/18/2020 1702 Gross per 24 hour Intake 125 ml Output 200 ml Net -75 ml Physical Exam: 
 
Gen:  Well-developed, well-nourished, in no acute distress HEENT:  Pink conjunctivae, PERRL, hearing intact to voice, moist mucous membranes Neck:  Supple, without masses, thyroid non-tender Resp:  No accessory muscle use, clear breath sounds without wheezes rales or rhonchi 
Card:  No murmurs, normal S1, S2 without thrills, bruits or peripheral edema Abd:  Soft, non-tender, non-distended, normoactive bowel sounds are present, no palpable organomegaly and no detectable hernias Lymph:  No cervical or inguinal adenopathy Musc:  No cyanosis or clubbing Skin:  No rashes or ulcers, skin turgor is good Neuro:  Cranial nerves are grossly intact, no focal motor weakness, follows commands appropriately Psych:  Good insight, oriented to person, place and time, alert 
__________________________________________________________________ Medications Reviewed: (see below) Medications:  
 
Current Facility-Administered Medications Medication Dose Route Frequency  polyethylene glycol (MIRALAX) packet 17 g  17 g Oral BID  senna-docusate (PERICOLACE) 8.6-50 mg per tablet 1 Tab  1 Tab Oral DAILY  insulin lispro (HUMALOG) injection   SubCUTAneous AC&HS  
 glucose chewable tablet 16 g  4 Tab Oral PRN  
 dextrose (D50W) injection syrg 12.5-25 g  12.5-25 g IntraVENous PRN  
 glucagon (GLUCAGEN) injection 1 mg  1 mg IntraMUSCular PRN  
 insulin NPH (NOVOLIN N, HUMULIN N) injection 12 Units  12 Units SubCUTAneous ACB&D  
 dexAMETHasone (DECADRON) tablet 10 mg  10 mg Oral DAILY  heparin 25,000 units in D5W 250 ml infusion  18-36 Units/kg/hr IntraVENous TITRATE  amLODIPine (NORVASC) tablet 5 mg  5 mg Oral DAILY  phenyleph-min oil-petrolatum (PREPARATION H) 0.25-14-74.9 % ointment   PeriANAL QID PRN  
 aspirin chewable tablet 324 mg  324 mg Oral DAILY  sodium chloride (NS) flush 5-40 mL  5-40 mL IntraVENous Q8H  
 sodium chloride (NS) flush 5-40 mL  5-40 mL IntraVENous PRN  
 acetaminophen (TYLENOL) tablet 650 mg  650 mg Oral Q4H PRN  
 naloxone (NARCAN) injection 0.4 mg  0.4 mg IntraVENous PRN  
 ondansetron (ZOFRAN) injection 4 mg  4 mg IntraVENous Q4H PRN  
 morphine injection 2 mg  2 mg IntraVENous Q4H PRN  
 ascorbic acid (vitamin C) (VITAMIN C) tablet 500 mg  500 mg Oral BID  zinc sulfate (ZINCATE) 220 (50) mg capsule 1 Cap  1 Cap Oral DAILY  atorvastatin (LIPITOR) tablet 40 mg  40 mg Oral DAILY  guaiFENesin ER (MUCINEX) tablet 1,200 mg  1,200 mg Oral BID  famotidine (PEPCID) tablet 20 mg  20 mg Oral QPM  
  
 
Lab Data Reviewed: (see below) Lab Review:  
 
Recent Labs  
  06/19/20 
0435 06/18/20 
0501 06/17/20 
9345 WBC 16.1* 17.4* 11.3* HGB 8.9* 8.3* 8.8* HCT 27.8* 26.4* 27.9*  
 309 320 Recent Labs  
  06/19/20 
0435 06/18/20 
0501 06/17/20 
3458  136 133* K 4.6 4.7 5.1 * 109* 105 CO2 20* 20* 20* * 284* 273* BUN 75* 69* 62* CREA 3.74* 4.02* 4.20* CA 8.4* 8.5 8.7 PHOS 4.3 4.3 4.4 ALB 2.4* 2.2* 2.2* TBILI 0.7 0.6 0.6 ALT 54 27 29 INR 1.1 1.2* 1.2* Lab Results Component Value Date/Time Glucose (POC) 245 (H) 06/19/2020 06:19 AM  
 Glucose (POC) 267 (H) 06/18/2020 10:58 PM  
 Glucose (POC) 455 (H) 06/18/2020 04:30 PM  
 Glucose (POC) 70 06/06/2020 06:13 AM  
 Glucose (POC) 73 06/05/2020 07:21 PM  
 
No results for input(s): PH, PCO2, PO2, HCO3, FIO2 in the last 72 hours. Recent Labs  
  06/19/20 
0435 06/18/20 
0501 06/17/20 
7266 INR 1.1 1.2* 1.2* All Micro Results Procedure Component Value Units Date/Time LEGIONELLA PNEUMOPHILA AG, URINE [[id number]] Collected:  06/05/20 9685 Order Status:  Completed Specimen:  Urine Updated:  06/08/20 1337 Source URINE     
  L pneumophila S1 Ag, urine Negative Comment: (NOTE) Presumptive negative for L. pneumophila serogroup 1 antigen in urine, 
suggesting no recent or current infection. Legionnaires' disease 
cannot be ruled out since other serogroups and species may also 
cause disease. Performed At: [address] [id number] Alison Santos MD HANS:[id number] CULTURE, RESPIRATORY/SPUTUM/BRONCH Danelle Blind [[id number]] Collected:  06/06/20 1022 Order Status:  Canceled Specimen:  Sputum MYCOPLASMA AB, IGM [[id number]] Collected:  06/05/20 1839 Order Status:  Completed Specimen:  Serum Updated:  06/06/20 0017 Mycoplasma Ab, IgM NONREACTIVE     
 RESPIRATORY PANEL,PCR,NASOPHARYNGEAL [[id number]] Collected:  06/05/20 1007 Order Status:  Completed Specimen:  Nasopharyngeal Updated:  06/05/20 1554 Adenovirus Not detected Coronavirus 229E Not detected Coronavirus HKU1 Not detected Coronavirus CVNL63 Not detected Coronavirus OC43 Not detected Metapneumovirus Not detected Rhinovirus and Enterovirus Not detected Influenza A Not detected Influenza A, subtype H1 Not detected Influenza A, subtype H3 Not detected INFLUENZA A H1N1 PCR Not detected Influenza B Not detected Parainfluenza 1 Not detected Parainfluenza 2 Not detected Parainfluenza 3 Not detected Parainfluenza virus 4 Not detected RSV by PCR Not detected B. parapertussis, PCR Not detected Bordetella pertussis - PCR Not detected Chlamydophila pneumoniae DNA, QL, PCR Not detected Mycoplasma pneumoniae DNA, QL, PCR Not detected I have reviewed notes of prior 24hr. Other pertinent lab: Total time spent with patient: 28  
              
 Care Plan discussed with: Patient, Nursing Staff and >50% of time spent in counseling and coordination of care Discussed:  Care Plan Prophylaxis:  Lovenox Disposition:  Home w/Family 
        
___________________________________________________ Attending Physician: Yasmeen Bhat MD

## 2020-06-19 NOTE — PROGRESS NOTES
1440: received call from telemetry that patients HR was elevated. This RN in to assess patient and found her lying on the floor. Assisted patient back to bed with Will Merritt RN. Vital signs obtained. Patient had bleeding from mole on the left side of face. Bleeding stopped by applying pressure.  on phone to assist. Patient reports \"I felt dizzy and just fell over. I have not felt like this before. I have pain around my waist\". Notified Dr. Erlin Webb of fall.  
 
1500: heparin infusion held due to patient fall and awaiting imaging. MD aware.  
 
1600: notified by charge RN that patients bed alarm was going off. This RN to bedside where patient was standing and saying \"ganesh ganesh\". Assisted patient to bedside commode with gait belt. Returned patient to bed and turned on bed alarm. Reminded patient via  to call before getting out of bed for help. 1616: assisted patient to stretcher for transport to CT. Patient restless and writhing around on stretcher.  at bedside to translate. Patient reported a lot of pain in her left hip. Charge RN notified Dr. Erlin Webb and received verbal order for hip xray. 1650: notified Dr. Erlin Webb of [facility] [phone]. Dr. Erlin Webb placed order for patient to receive 22 units of lispro and 30 units of NPH. Informed Dr. Erlin Webb of patients increased pain. MD placed order for percocet 5-325 mg. 
 
0641: notified by PCT that she assisted patient to bedside commode and patient became pale and started \"closing her eyes while sitting on toilet\". This RN in to assess patient. Assisted patient back to bed with gait belt. Patient pale with shallow breathing. SPO2 in the 80's. Increased O2 to 4L. Rapid response called. Vital signs obtained. RT placed patient on 7 L. Dr. Erlin Webb notified and advised to transport patient to step down unit.  
 
1730: TRANSFER - OUT REPORT: Verbal report given to Carroll Poole RN (name) on Javon Pica  being transferred to [facility](unit) for change in patient condition(after fall requiring more oxygen). Report consisted of patients Situation, Background, Assessment and Recommendations(SBAR). Information from the following report(s) SBAR, Kardex, [facility] and Recent Results was reviewed with the receiving nurse. Lines:  
Triple Lumen 06/14/20 Right (Active) Central Line Being Utilized Yes 6/18/2020  8:22 PM  
Criteria for Appropriate Use Limited/no vessel suitable for conventional peripheral access 6/18/2020  8:22 PM  
Site Assessment Other (Comment) 6/18/2020  8:22 PM  
Infiltration Assessment 0 6/18/2020  8:22 PM  
Affected Extremity/Extremities Color distal to insertion site pink (or appropriate for race) 6/18/2020 11:40 AM  
Date of Last Dressing Change 06/14/20 6/18/2020 11:40 AM  
Dressing Status Old drainage 6/18/2020  8:22 PM  
Dressing Type Disk with Chlorhexadine gluconate (CHG) 6/18/2020  8:22 PM  
Action Taken Open ports on tubing capped 6/18/2020  8:22 PM  
Proximal Hub Color/Line Status Blue 6/18/2020  8:22 PM  
Positive Blood Return (Medial Site) Yes 6/18/2020  8:22 PM  
Medial Hub Color/Line Status Brown 6/18/2020  8:22 PM  
Positive Blood Return (Lateral Site) Yes 6/18/2020  8:22 PM  
Distal Hub Color/Line Status White 6/18/2020  8:22 PM  
Positive Blood Return (Site #3) Yes 6/18/2020  8:22 PM  
Alcohol Cap Used Yes 6/18/2020  8:22 PM  
   
Peripheral IV 06/13/20 Anterior;Left;Proximal Forearm (Active) Site Assessment Clean, dry, & intact 6/18/2020  8:22 PM  
Phlebitis Assessment 0 6/18/2020  8:22 PM  
Infiltration Assessment 0 6/18/2020  8:22 PM  
Dressing Status Clean, dry, & intact 6/18/2020  8:22 PM  
Dressing Type Transparent 6/18/2020  8:22 PM  
Hub Color/Line Status Pink 6/18/2020  8:22 PM  
Action Taken Open ports on tubing capped 6/18/2020  8:22 PM  
Alcohol Cap Used Yes 6/18/2020  8:22 PM  
   
Peripheral IV 06/13/20 Right;Upper Cephalic (Active) Site Assessment Clean, dry, & intact 6/18/2020  8:22 PM  
Phlebitis Assessment 0 6/18/2020  8:22 PM  
Infiltration Assessment 0 6/18/2020  8:22 PM  
Dressing Status Clean, dry, & intact 6/18/2020  8:22 PM  
Dressing Type Transparent 6/18/2020  8:22 PM  
Hub Color/Line Status Pink 6/18/2020  8:22 PM  
Action Taken Open ports on tubing capped 6/18/2020  8:22 PM  
Alcohol Cap Used Yes 6/18/2020  8:22 PM  
  
Opportunity for questions and clarification was provided. 1850: Patient transported with:  O2 @ 6 liters, monitor, RN and PCT.

## 2020-06-19 NOTE — PROGRESS NOTES
[facility] [place], Critical Care, and Sleep Medicine Progress Note Name: Radha Downs MRN: [id number] : 1945 Hospital: [address] [facility] Date: 2020 IMPRESSION:  
· Acute Respiratory Failure with Hypoxia; improving · COVID-19+ · Elevated troponin · LESLEY, ? CKD · HTN  
  
RECOMMENDATIONS:  
· Supplemental O2 as needed to keep sats > 88% and now on 1L · Completed azithro for 5 days. · Stopped Anthony on  · Renal following. No urgent need for RRT. · Continue Dexamethasone for 10 days total. 
· Trend inflammatory markers · S/P course of Remdesivir; completed  · Continue vitamin C, zinc, lipitor · Heparin drip · IS  
· Mucinex · Pepcid 
· PT/OT 
· Nearing potential for discharge Ms. Halima Gaona is stable from a pulmonary standpoint. We will sign off and arrange for outpatient follow-up in 2-3 weeks. Thank you for allowing us to participate in Ms. Romero's care. Subjective:  
 
Ms. Halima Goana is a 73yo female w/ history of HTN who presented to the ER on  w/ complaints of shortness of breath, weakness and chest pain. Sats 93% on RA. Has family members that are COVID+. Home med list includes 10mg prednisone that she has apparently been taking since April for \"pain. \" 
 
 Interval history Afebrile HR 70-80s; better Sats 95% on 1L NC 
WBC 16.1; better Hgb 8.9 Pro-BNP 2212; better ; worse CRP 5.8; better Feritin 443; worse D-dimer . 79; better Creat 3.74; better LFTs increased, , ALT 54 
200mL urine output documented CXR : slight improvement in overall aeration CXR : slight improvement in bilateral infiltrates LE dopplers without obvious DVT, but cannot be ruled out on the left ECHO: EF 27-43%; grade 1 diastolic dysfunction ROS: Feels weak and still a little SOB. Past Medical History:  
Diagnosis Date  
 HTN (hypertension), benign No past surgical history on file. Prior to Admission medications Medication Sig Start Date End Date Taking? Authorizing Provider  
lisinopriL (PRINIVIL, ZESTRIL) 5 mg tablet Take 5 mg by mouth daily. Yes Provider, Historical  
predniSONE (DELTASONE) 5 mg tablet Take 10 mg by mouth daily. 6/2/20 7/2/20 Yes Provider, Historical  
 
No Known Allergies Social History Tobacco Use  Smoking status: Never Smoker  Smokeless tobacco: Never Used Substance Use Topics  Alcohol use: Never Frequency: Never Family History Problem Relation Age of Onset  Diabetes Neg Hx Current Facility-Administered Medications Medication Dose Route Frequency  polyethylene glycol (MIRALAX) packet 17 g  17 g Oral BID  senna-docusate (PERICOLACE) 8.6-50 mg per tablet 1 Tab  1 Tab Oral DAILY  insulin lispro (HUMALOG) injection   SubCUTAneous AC&HS  insulin NPH (NOVOLIN N, HUMULIN N) injection 12 Units  12 Units SubCUTAneous ACB&D  
 dexAMETHasone (DECADRON) tablet 10 mg  10 mg Oral DAILY  heparin 25,000 units in D5W 250 ml infusion  18-36 Units/kg/hr IntraVENous TITRATE  amLODIPine (NORVASC) tablet 5 mg  5 mg Oral DAILY  aspirin chewable tablet 324 mg  324 mg Oral DAILY  sodium chloride (NS) flush 5-40 mL  5-40 mL IntraVENous Q8H  
 ascorbic acid (vitamin C) (VITAMIN C) tablet 500 mg  500 mg Oral BID  zinc sulfate (ZINCATE) 220 (50) mg capsule 1 Cap  1 Cap Oral DAILY  atorvastatin (LIPITOR) tablet 40 mg  40 mg Oral DAILY  guaiFENesin ER (MUCINEX) tablet 1,200 mg  1,200 mg Oral BID  famotidine (PEPCID) tablet 20 mg  20 mg Oral QPM  
 
 
Review of Systems: A comprehensive review of systems was negative except for that written in the HPI. Objective:  
Vital Signs:   
Visit Vitals /83 (BP 1 Location: Left arm, BP Patient Position: At rest) Pulse 76 Temp 97.7 °F (36.5 °C) Resp 16 Ht 4' 11\" (1.499 m) Wt 78 kg (171 lb 15.3 oz) SpO2 95% BMI 34.73 kg/m² O2 Device: Nasal cannula O2 Flow Rate (L/min): 1 l/min Temp (24hrs), Av °F (36.7 °C), Min:97.6 °F (36.4 °C), Max:98.6 °F (37 °C) Intake/Output:  
Last shift:      No intake/output data recorded. Last 3 shifts:  1901 -  0700 In: 125 [P.O.:125] Out: 200 [Urine:200] Intake/Output Summary (Last 24 hours) at 2020 0727 Last data filed at 2020 1702 Gross per 24 hour Intake 125 ml Output 200 ml Net -75 ml Physical Exam:  
General:  Alert, cooperative, no acute distress, appears stated age. Head:  NCAT Eyes:  EOMI, conjunctive clear Nose: Nares normal, NC in place Throat: MMM Neck:  Supple, FROM, trachea midline Lungs:   Deferred Chest wall:  Normal shape, nontender Heart:  Deferred Abdomen:   Obese, soft, NT Extremities: No c/c/e Pulses: Deferred Skin: CDI, no rash Lymph nodes: Deferred Neurologic: No focal findings, following commands Data review:  
 
Recent Results (from the past 24 hour(s)) PTT Collection Time: 20 11:42 AM  
Result Value Ref Range aPTT 79.5 (H) 22.1 - 32.0 sec  
 aPTT, therapeutic range     58.0 - 77.0 SECS  
GLUCOSE, POC Collection Time: 20  4:30 PM  
Result Value Ref Range Glucose (POC) 455 (H) 65 - 100 mg/dL Performed by Sandra Ro (PCT) PTT Collection Time: 20  6:37 PM  
Result Value Ref Range aPTT 50.7 (H) 22.1 - 32.0 sec  
 aPTT, therapeutic range     58.0 - 77.0 SECS  
GLUCOSE, POC Collection Time: 20 10:58 PM  
Result Value Ref Range Glucose (POC) 267 (H) 65 - 100 mg/dL Performed by Sadia Abdalla (PCT) PROTHROMBIN TIME + INR Collection Time: 20  4:35 AM  
Result Value Ref Range INR 1.1 0.9 - 1.1 Prothrombin time 11.7 (H) 9.0 - 11.1 sec PTT Collection Time: 20  4:35 AM  
Result Value Ref Range aPTT 45.3 (H) 22.1 - 32.0 sec  
 aPTT, therapeutic range     58.0 - 77.0 SECS FIBRINOGEN  Collection Time: 20  4:35 AM  
 Result Value Ref Range Fibrinogen 365 200 - 475 mg/dL D DIMER Collection Time: 06/19/20  4:35 AM  
Result Value Ref Range D-dimer 0.79 (H) 0.00 - 0.65 mg/L FEU FERRITIN Collection Time: 06/19/20  4:35 AM  
Result Value Ref Range Ferritin 443 (H) 26 - 388 NG/ML  
CBC WITH AUTOMATED DIFF Collection Time: 06/19/20  4:35 AM  
Result Value Ref Range WBC 16.1 (H) 3.6 - 11.0 K/uL  
 RBC 3.02 (L) 3.80 - 5.20 M/uL HGB 8.9 (L) 11.5 - 16.0 g/dL HCT 27.8 (L) 35.0 - 47.0 % MCV 92.1 80.0 - 99.0 FL  
 MCH 29.5 26.0 - 34.0 PG  
 MCHC 32.0 30.0 - 36.5 g/dL  
 RDW 14.2 11.5 - 14.5 % PLATELET 077 932 - 289 K/uL MPV 10.0 8.9 - 12.9 FL  
 NRBC 0.1 (H) 0  WBC ABSOLUTE NRBC 0.02 (H) 0.00 - 0.01 K/uL NEUTROPHILS 84 (H) 32 - 75 % LYMPHOCYTES 12 12 - 49 % MONOCYTES 4 (L) 5 - 13 % EOSINOPHILS 0 0 - 7 % BASOPHILS 0 0 - 1 % IMMATURE GRANULOCYTES 0 0.0 - 0.5 % ABS. NEUTROPHILS 13.6 (H) 1.8 - 8.0 K/UL  
 ABS. LYMPHOCYTES 1.9 0.8 - 3.5 K/UL  
 ABS. MONOCYTES 0.6 0.0 - 1.0 K/UL  
 ABS. EOSINOPHILS 0.0 0.0 - 0.4 K/UL  
 ABS. BASOPHILS 0.0 0.0 - 0.1 K/UL  
 ABS. IMM. GRANS. 0.0 0.00 - 0.04 K/UL  
 DF MANUAL    
 RBC COMMENTS ANISOCYTOSIS 
1+ METABOLIC PANEL, COMPREHENSIVE Collection Time: 06/19/20  4:35 AM  
Result Value Ref Range Sodium 138 136 - 145 mmol/L Potassium 4.6 3.5 - 5.1 mmol/L Chloride 110 (H) 97 - 108 mmol/L  
 CO2 20 (L) 21 - 32 mmol/L Anion gap 8 5 - 15 mmol/L Glucose 239 (H) 65 - 100 mg/dL BUN 75 (H) 6 - 20 MG/DL Creatinine 3.74 (H) 0.55 - 1.02 MG/DL  
 BUN/Creatinine ratio 20 12 - 20 GFR est AA 14 (L) >60 ml/min/1.73m2 GFR est non-AA 12 (L) >60 ml/min/1.73m2 Calcium 8.4 (L) 8.5 - 10.1 MG/DL Bilirubin, total 0.7 0.2 - 1.0 MG/DL  
 ALT (SGPT) 54 12 - 78 U/L  
 AST (SGOT) 107 (H) 15 - 37 U/L Alk. phosphatase 193 (H) 45 - 117 U/L Protein, total 6.3 (L) 6.4 - 8.2 g/dL Albumin 2.4 (L) 3.5 - 5.0 g/dL Globulin 3.9 2.0 - 4.0 g/dL A-G Ratio 0.6 (L) 1.1 - 2.2 C REACTIVE PROTEIN, QT Collection Time: 06/19/20  4:35 AM  
Result Value Ref Range C-Reactive protein 3.84 (H) 0.00 - 0.60 mg/dL LD Collection Time: 06/19/20  4:35 AM  
Result Value Ref Range  (H) 81 - 246 U/L  
NT-PRO BNP Collection Time: 06/19/20  4:35 AM  
Result Value Ref Range NT pro-BNP 2,212 (H) <450 PG/ML  
PHOSPHORUS Collection Time: 06/19/20  4:35 AM  
Result Value Ref Range Phosphorus 4.3 2.6 - 4.7 MG/DL  
SAMPLES BEING HELD Collection Time: 06/19/20  4:35 AM  
Result Value Ref Range SAMPLES BEING HELD 2BLU'S   
 COMMENT Add-on orders for these samples will be processed based on acceptable specimen integrity and analyte stability, which may vary by analyte. GLUCOSE, POC Collection Time: 06/19/20  6:19 AM  
Result Value Ref Range Glucose (POC) 245 (H) 65 - 100 mg/dL Performed by Sadia Abdalla (PCT) Imaging: 
I have personally reviewed the patients radiographs and have reviewed the reports: 
CXR (6/8/2020): There are diffuse patchy bilateral infiltrates, increased in density as compared to the prior study. CXR (6/12/2020): There is patchy bilateral upper lobe airspace disease that has increased as compared to the prior study. CXR 6/16:  Slight improvement in bilateral infiltration Ryan Ayala NP

## 2020-06-19 NOTE — PROGRESS NOTES
Physical Therapy orders acknowledged, chart reviewed and discussed with nurse advised to hold for now pending CT. We will continue to follow up with the patient for therapy thank you.

## 2020-06-19 NOTE — PROGRESS NOTES
Occupational therapy note: 
Orders received, chart reviewed. Patient with orders for CT following fall earlier today. Will hold OT evaluation and follow up pending CT. Racheal Decker MS OTR/L

## 2020-06-19 NOTE — PROGRESS NOTES
Problem: Breathing Pattern - Ineffective Goal: Ability to achieve and maintain a regular respiratory rate Outcome: Progressing Towards Goal 
  
Problem: Isolation Precautions - Risk of Spread of Infection Goal: Prevent transmission of infectious organism to others Outcome: Progressing Towards Goal 
  
Problem: Falls - Risk of 
Goal: *Absence of Falls Description: Document Richard Granda Fall Risk and appropriate interventions in the flowsheet. Outcome: Progressing Towards Goal 
Note: Fall Risk Interventions: 
Mobility Interventions: Patient to call before getting OOB Mentation Interventions: Bed/chair exit alarm Medication Interventions: Bed/chair exit alarm Elimination Interventions: Call light in reach History of Falls Interventions: Bed/chair exit alarm

## 2020-06-20 PROBLEM — E87.20 METABOLIC ACIDOSIS: Status: ACTIVE | Noted: 2020-01-01

## 2020-06-20 PROBLEM — E11.9 DM (DIABETES MELLITUS) (HCC): Status: ACTIVE | Noted: 2020-01-01

## 2020-06-20 PROBLEM — E87.5 HYPERKALEMIA: Status: ACTIVE | Noted: 2020-01-01

## 2020-06-20 PROBLEM — E87.20 LACTIC ACIDOSIS: Status: ACTIVE | Noted: 2020-01-01

## 2020-06-20 PROBLEM — D62 ACUTE BLOOD LOSS ANEMIA: Status: ACTIVE | Noted: 2020-01-01

## 2020-06-20 PROBLEM — R57.1 HYPOVOLEMIC SHOCK (HCC): Status: ACTIVE | Noted: 2020-01-01

## 2020-06-20 PROBLEM — R06.02 SOB (SHORTNESS OF BREATH): Status: RESOLVED | Noted: 2020-01-01 | Resolved: 2020-01-01

## 2020-06-20 NOTE — PROGRESS NOTES
[email or web address]  This RRT RN noticed that this patient's MEWS Score was 5. This nurse went to [facility] to speak with Lyric Higuera RN (primary nurse) regarding this increased MEWS Score and hypotension. Lyric Higuera RN (primary nurse) informed this nurse that she did not know why this patient had a MEWS Score of 5. Upon entering room RN observed patient low blood pressure, patient responding denies pain at present time. This nurse went to speak with charge nurse regarding this patient and contacted Dr. Brice Heredia for Sbp in 60's. Dr. Brice Heredia gave orders for fluid bolus and Clinton-Synepherine IV drip. Orders received RB/V. Rapid Response called by [facility] staff. This RN on unit; Upon entering this patient's room, this nurse noticed this patient was being manually resuscitated by nursing staff, pt unresponsive with a weak thready pulse and agonal respirations. Code Blue was called on [email or web address] plan to immediately transfer patient to ICU for intubation and continuation of care.

## 2020-06-20 NOTE — PROGRESS NOTES
Ortho update: 
 
Events and chart reviewed/ Pt now in ICU/ intubated and unstable for CT or surgery. Will conitnue to follow along closely for change in status. Dr. Karthikeyan Miller is aware. Jerolyn Schaumann, NP Spoke with Mesfin Oliva about the patient. Agree with plan of care as outlined above. Edna Loredo M.D. [address] [place] [address]

## 2020-06-20 NOTE — PROGRESS NOTES
Problem: Non-Violent Restraints Goal: *Removal from restraints as soon as assessed to be safe Outcome: Progressing Towards Goal 
Goal: *No harm/injury to patient while restraints in use Outcome: Progressing Towards Goal 
Goal: *Patient's dignity will be maintained Outcome: Progressing Towards Goal 
Goal: *Patient Specific Goal progressing towards goal, no pulling at tubing, safety maintained Outcome: Progressing Towards Goal 
Goal: Non-violent Restaints:Standard Interventions Outcome: Progressing Towards Goal 
Goal: Non-violent Restraints:Patient Interventions Outcome: Progressing Towards Goal 
Goal: Patient/Family Education Outcome: Progressing Towards Goal

## 2020-06-20 NOTE — PROGRESS NOTES
Antimicrobial Dosing: 
 
Pharmacy automatically adjusted metronidazole to 500 mg every 12 hours per P&T policy. Patient does not have any of the following exclusions: 
-C. Difficile 
-CNS infection 
-non-anaerobic infections (giardiasis, trichomonas, H. Pylori, etc) Thank you, Anisha Doll, PharmD, BCPS       Contact: [phone]

## 2020-06-20 NOTE — PROCEDURES
Intubation Note Name:  Mayra Hernandez Age:  67 y.o. MRN:  [id number] CSN:  [id number] :  1945 Referring physician: ICU hospitalist  
 
Called to bedside secondary to  respiratory failure. Patient pre-oxygenated with 100% oxygen. Smooth RSI with Propofol 150 mg + Succinylcholine 100 mg IV. Video Laryngoscope with S4 Blade, Grade 1 view obtained 7.0 ETT taped and secured at 22 cm at the lips. 
 
+ Bilateral BS, + Chest rise, + ETCO2 
 
VSS. CXR pending.  
 
Care turned over to covering Attending MD. 
 
Nely Castellon DO

## 2020-06-20 NOTE — PROGRESS NOTES
[facility] [address] [place] YOB: 1945 Assessment & Plan:  
LESLEY, progressive - Cr around 4 
- Poor urine Hyperkalemia 
- better this am 
Acidosis - better with iv bicarb drip CKD 3-4 COVID + PNA, oxygenation better HTN 
HL 
 
Rec: 
Serial labs Heading towards CRRT I CALLED Son in [place]: he will dw his wife and let us know if they would want CRRT or Comfort Subjective:  
CC: F/u LESLEY 
HPI: Renal function remains poor. Transferred to ICU, Cr better from 4.9 to 4. She developed severe acidosis, better. Intubated. Current Facility-Administered Medications Medication Dose Route Frequency  pantoprazole (PROTONIX) 40 mg in 0.9% sodium chloride 10 mL injection  40 mg IntraVENous Q12H  
 [START ON 6/21/2020] Vancomycin RANDOM level @0400 on 6/21/2020   Other ONCE  
 metroNIDAZOLE (FLAGYL) IVPB premix 500 mg  500 mg IntraVENous Q12H  
 oxyCODONE-acetaminophen (PERCOCET) 5-325 mg per tablet 1 Tab  1 Tab Oral Q6H PRN  propofol (DIPRIVAN) 10 mg/mL infusion  0-50 mcg/kg/min IntraVENous TITRATE  fentaNYL (PF) 1,500 mcg/30 mL (50 mcg/mL) infusion  0-200 mcg/hr IntraVENous TITRATE  
 NOREPINephrine (LEVOPHED) 8 mg in 5% dextrose 250mL (32 mcg/mL) infusion  0.5-30 mcg/min IntraVENous TITRATE  insulin regular (NOVOLIN R, HUMULIN R) 100 Units in 0.9% sodium chloride 100 mL infusion  0-50 Units/hr IntraVENous TITRATE  insulin lispro (HUMALOG) injection   SubCUTAneous TIDAC  glucose chewable tablet 16 g  4 Tab Oral PRN  
 dextrose (D50W) injection syrg 12.5-25 g  25-50 mL IntraVENous PRN  
 glucagon (GLUCAGEN) injection 1 mg  1 mg IntraMUSCular PRN  
 vasopressin (VASOSTRICT) 20 Units in 0.9% sodium chloride 100 mL infusion  0-0.1 Units/min IntraVENous CONTINUOUS  
 sodium bicarbonate (8.4%) 150 mEq in sterile water 1,000 mL infusion   IntraVENous CONTINUOUS  
  PHENYLephrine (NATE-SYNEPHRINE) 100 mg in 0.9% sodium chloride 250 mL infusion   mcg/min IntraVENous TITRATE  cefepime (MAXIPIME) 1 g in 0.9% sodium chloride (MBP/ADV) 50 mL  1 g IntraVENous Q24H  
 vancomycin: Pharmacy dosing by random level   Other Rx Dosing/Monitoring  0.9% sodium chloride infusion 250 mL  250 mL IntraVENous PRN  polyethylene glycol (MIRALAX) packet 17 g  17 g Oral BID  senna-docusate (PERICOLACE) 8.6-50 mg per tablet 1 Tab  1 Tab Oral DAILY  dexAMETHasone (DECADRON) tablet 10 mg  10 mg Oral DAILY  phenyleph-min oil-petrolatum (PREPARATION H) 0.25-14-74.9 % ointment   PeriANAL QID PRN  
 sodium chloride (NS) flush 5-40 mL  5-40 mL IntraVENous Q8H  
 sodium chloride (NS) flush 5-40 mL  5-40 mL IntraVENous PRN  
 acetaminophen (TYLENOL) tablet 650 mg  650 mg Oral Q4H PRN  
 naloxone (NARCAN) injection 0.4 mg  0.4 mg IntraVENous PRN  
 ondansetron (ZOFRAN) injection 4 mg  4 mg IntraVENous Q4H PRN  
 morphine injection 2 mg  2 mg IntraVENous Q4H PRN  
 ascorbic acid (vitamin C) (VITAMIN C) tablet 500 mg  500 mg Oral BID  zinc sulfate (ZINCATE) 220 (50) mg capsule 1 Cap  1 Cap Oral DAILY  atorvastatin (LIPITOR) tablet 40 mg  40 mg Oral DAILY  guaiFENesin ER (MUCINEX) tablet 1,200 mg  1,200 mg Oral BID Objective:  
 
Vitals: 
Blood pressure 94/72, pulse (!) 107, temperature 99.2 °F (37.3 °C), resp. rate 25, height 4' 11\" (1.499 m), weight 78 kg (171 lb 15.3 oz), SpO2 91 %. Temp (24hrs), Av.1 °F (36.2 °C), Min:94.9 °F (34.9 °C), Max:99.2 °F (37.3 °C) Intake and Output: 
 0701 -  1900 In: 9408 [I.V.:4394] Out: 58 [Urine:8]  1901 -  0700 In: 851.3 Out: 0 Physical Exam:              
IN person exam deferred due to COVID isolation status ECG/rhythm: 
 
Data Review CXR (2020): There are diffuse patchy bilateral infiltrates, increased in density as compared to the prior study No results for input(s): TNIPOC in the last 72 hours. No lab exists for component: ITNL Recent Labs  
  06/19/20 2245 TROIQ 0.16* Recent Labs  
  06/20/20 
3937 06/20/20 0444 06/20/20 
0016 06/19/20 2245 06/19/20 2021 06/19/20 
0435 06/18/20 
0501  143 144 143 139  --  138 136  
K 4.0 4.1 4.7 5.0 6.1*  --  4.6 4.7 * 114* 113* 113* 108  --  110* 109* CO2 20* 17* 13* 12* 10*  --  20* 20* BUN 73* 72* 75* 73* 83*  --  75* 69* CREA 4.15* 4.14* 4.19* 4.27* 4.93*  --  3.74* 4.02* * 275* 443* 531* 646*  --  239* 284* PHOS  --  4.5  --   --  9.5*  --  4.3 4.3 MG 2.1 1.9 2.2 2.2 2.6*   < >  --   --   
CA 6.7* 6.8* 7.0* 6.6* 7.3*  --  8.4* 8.5 ALB  --  2.3*  --   --   --   --  2.4* 2.2* WBC  --  24.1*  --  21.1*  --   --  16.1* 17.4* HGB 11.7 11.6  --  3.6*  --   --  8.9* 8.3* HCT  --  34.4*  --  12.5*  --   --  27.8* 26.4*  
PLT  --  116*  --  175  --   --  292 309  
 < > = values in this interval not displayed. Recent Labs  
  06/20/20 0444 06/20/20 0016 06/19/20 2133 06/19/20 
1220 06/19/20 
0435 INR 1.5* 1.5*  --   --  1.1 PTP 15.1* 15.4*  --   --  11.7* APTT  --  42.8* 49.9* 93.4* 45.3* Needs: urine analysis, urine sodium, protein and creatinine Lab Results Component Value Date/Time Sodium,urine random 80 06/07/2020 09:03 PM  
 Creatinine, urine 59.00 06/07/2020 09:03 PM  
 Creatinine, urine 62.10 06/07/2020 09:03 PM  
 
 
 
 
: Jeremy Nance MD 
6/20/2020 [facility]: 
[email or web address] [place] office: 
[address], [address] Phone: [phone] Fax :     [phone] [place] office: 
[address] [facility] [address] [place] Phone - [phone] Fax - [phone]

## 2020-06-20 NOTE — PROGRESS NOTES
Called family and spoke to son in law who translated for his wife who is the daughter of the patient. Gave the family ICU's number to call and give updates.

## 2020-06-20 NOTE — DISCHARGE SUMMARY
Physician Interim Summary Patient ID: 
Amilcar Estimable 
[id number] 
47 y.o. 
1945 PCP: Angus, MD Latha  
 
Consults: Pulmonary/Intensive care Covering dates: 6/5/2020 through 6/20/2020 Admission Diagnoses: Pneumonia due to COVID-19 virus [U07.1, J12.89] Hospital Course:  
 Mrs Vanessa Puente is 67years old female admitted with progressively worsening generalized weakness, body aches and SOB. She was diagnosed with COVID-19 pneumonia. After admission patient was treated with Remdisivir and Anthony. Later added dexamethasone. Patient has improved some. On 6/19/2020, patient was trying to go to the bathroom and fell hitting her head and hip. Later on the day, patient's condition dramatically worsened, by becoming hypotensive and hypoxic requiring high flow oxygen. Later, CODE BLUE was called and patient was intubated and transferred to ICU. Patient found to have be severely anemic requiring 3 units of packed RBC transfusion. Patient is currently on broad-spectrum antibiotics for possible sepsis and multiple pressors. Patient is severely metabolic acidosis, increased lactic acid level and very high blood glucose. On insulin and bicarb drip. Please see daily progress notes for detail. Additional hospital course and discharge summary will be done by discharging physician.

## 2020-06-20 NOTE — PROGRESS NOTES
Suresh Praterelsen [facility] [address] 
[address] [facility], [address] [place] [address] 
[phone] Medical Progress Note NAME: Javon Mendoza :  1945 MRM:  [id number] Date/Time: 2020  2:18 AM 
 
 
  
Subjective: Chief Complaint:  Responded to \"CODE BLUE\" at 741PM on . Pt noted to be hypoxic on the monitor. Apparently was found to be with agonal respirations for which code blue was called. No loss of pulse. Team began bagging the patient with improvement in sat's. SBP's in the 60's. Fluid bolus started with brittnee gtt. Pt transferred to the ICU. Did begin to wake slightly. Narcan given with slightly more important in mentation but sat's remained extremely low on NC. Anesthesia intubated pt. Due to persistently low BP's, additional pressors added. BG markedly elevated. Insulin gtt started. As additional labs, ABG returned pt was noted to be extremely acidotic. Bicarb amps x 2 given and bicarb gtt started. Ventilator RR increased. Family called and updated re critical status. After significant delay in obtaining CBC (per lab had not received sample though sample sent per RN), pt was noted to have a critical hemoglobin of 3.6. 3u PRBC's ordered. Consent obtained. Objective:  
 
 
Vitals:  
 
 
  
Last 24hrs VS reviewed since prior progress note. Most recent are: 
 
Visit Vitals /44 Pulse (!) 101 Temp 97.1 °F (36.2 °C) Resp 25 Ht 4' 11\" (1.499 m) Wt 78 kg (171 lb 15.3 oz) SpO2 96% BMI 34.73 kg/m² SpO2 Readings from Last 6 Encounters:  
20 96% O2 Flow Rate (L/min): 7 l/min Intake/Output Summary (Last 24 hours) at 2020 0218 Last data filed at 2020 0005 Gross per 24 hour Intake 0 ml Output  Net 0 ml Exam:  
 
Physical Exam: At the time of CODE blue Gen:  Disheveled chronically ill appearing female HEENT:  Pink conjunctivae, PERRL Neck:  Supple, without masses, thyroid non-tender Resp: Coarse breath sounds bilaterally Card:  No murmurs, normal S1, S2 without thrills, bruits or peripheral edema Abd: Distended but not apparently tender Musc:  No cyanosis or clubbing Skin:  No rashes or ulcers, skin turgor is good Neuro: Withdraws to painful stimuli bilaterally in the UE and LE Psych: Pt obtunded Medications Reviewed: (see below) Lab Data Reviewed: (see below) 
 
______________________________________________________________________ Medications:  
 
Current Facility-Administered Medications Medication Dose Route Frequency  phytonadione (vitamin K1) (AQUA-MEPHYTON) 10 mg in 0.9% sodium chloride 50 mL IVPB  10 mg IntraVENous ONCE  
 0.9% sodium chloride infusion  100 mL/hr IntraVENous CONTINUOUS  
 oxyCODONE-acetaminophen (PERCOCET) 5-325 mg per tablet 1 Tab  1 Tab Oral Q6H PRN  propofol (DIPRIVAN) 10 mg/mL infusion  0-50 mcg/kg/min IntraVENous TITRATE  fentaNYL (PF) 1,500 mcg/30 mL (50 mcg/mL) infusion  0-200 mcg/hr IntraVENous TITRATE  famotidine (PF) (PEPCID) 20 mg in 0.9% sodium chloride 10 mL injection  20 mg IntraVENous DAILY  NOREPINephrine (LEVOPHED) 8 mg in 5% dextrose 250mL (32 mcg/mL) infusion  0.5-30 mcg/min IntraVENous TITRATE  insulin regular (NOVOLIN R, HUMULIN R) 100 Units in 0.9% sodium chloride 100 mL infusion  0-50 Units/hr IntraVENous TITRATE  insulin lispro (HUMALOG) injection   SubCUTAneous TIDAC  glucose chewable tablet 16 g  4 Tab Oral PRN  
 dextrose (D50W) injection syrg 12.5-25 g  25-50 mL IntraVENous PRN  
 glucagon (GLUCAGEN) injection 1 mg  1 mg IntraMUSCular PRN  
 vasopressin (VASOSTRICT) 20 Units in 0.9% sodium chloride 100 mL infusion  0-0.1 Units/min IntraVENous CONTINUOUS  
 sodium bicarbonate (8.4%) 150 mEq in sterile water 1,000 mL infusion   IntraVENous CONTINUOUS  
 PHENYLephrine (NATE-SYNEPHRINE) 100 mg in 0.9% sodium chloride 250 mL infusion   mcg/min IntraVENous TITRATE  cefepime (MAXIPIME) 1 g in 0.9% sodium chloride (MBP/ADV) 50 mL  1 g IntraVENous Q24H  
 vancomycin: Pharmacy dosing by random level   Other Rx Dosing/Monitoring  0.9% sodium chloride infusion 250 mL  250 mL IntraVENous PRN  polyethylene glycol (MIRALAX) packet 17 g  17 g Oral BID  senna-docusate (PERICOLACE) 8.6-50 mg per tablet 1 Tab  1 Tab Oral DAILY  dexAMETHasone (DECADRON) tablet 10 mg  10 mg Oral DAILY  phenyleph-min oil-petrolatum (PREPARATION H) 0.25-14-74.9 % ointment   PeriANAL QID PRN  
 sodium chloride (NS) flush 5-40 mL  5-40 mL IntraVENous Q8H  
 sodium chloride (NS) flush 5-40 mL  5-40 mL IntraVENous PRN  
 acetaminophen (TYLENOL) tablet 650 mg  650 mg Oral Q4H PRN  
 naloxone (NARCAN) injection 0.4 mg  0.4 mg IntraVENous PRN  
 ondansetron (ZOFRAN) injection 4 mg  4 mg IntraVENous Q4H PRN  
 morphine injection 2 mg  2 mg IntraVENous Q4H PRN  
 ascorbic acid (vitamin C) (VITAMIN C) tablet 500 mg  500 mg Oral BID  zinc sulfate (ZINCATE) 220 (50) mg capsule 1 Cap  1 Cap Oral DAILY  atorvastatin (LIPITOR) tablet 40 mg  40 mg Oral DAILY  guaiFENesin ER (MUCINEX) tablet 1,200 mg  1,200 mg Oral BID Lab Review:  
 
Recent Labs  
  06/19/20 
2245 06/19/20 
0435 06/18/20 
0501 WBC 21.1* 16.1* 17.4* HGB 3.6* 8.9* 8.3* HCT 12.5* 27.8* 26.4*  
 292 309 Recent Labs  
  06/20/20 
0016 06/19/20 
2245 06/19/20 
2021 06/19/20 
8722 06/18/20 
0501 06/17/20 
5509  143 139 138 136 133* K 4.7 5.0 6.1* 4.6 4.7 5.1 * 113* 108 110* 109* 105 CO2 13* 12* 10* 20* 20* 20* * 531* 646* 239* 284* 273* BUN 75* 73* 83* 75* 69* 62* CREA 4.19* 4.27* 4.93* 3.74* 4.02* 4.20* CA 7.0* 6.6* 7.3* 8.4* 8.5 8.7 MG 2.2 2.2 2.6*  --   --   --   
PHOS  --   --  9.5* 4.3 4.3 4.4 ALB  --   --   --  2.4* 2.2* 2.2* ALT  --   --   --  54 27 29 INR 1.5*  --   --  1.1 1.2* 1.2* No components found for: [place] Assessment / Plan: Acute blood loss anemia (6/20/2020) - unclear etiology. Due to the acuity of the onset and temporal relation to the fall, suspect 2/2 fracture of her L femur. Unfortunately has been too unstable to obtain CT of the L hip  
-transfuse PRBC's STAT  
-serial H+H  
-stop heparin gtt 
-stop ASA  
-INR 1.5; give vitamin K considering life threatening bleed  
-may need protamine if PTT doesn't trend down rapidly  
-check stool blood though lower suspicion this is GI  
-IV PPI BID  
 
  DM (diabetes mellitus) ([facility] [address] (6/20/2020) - BG markedly elevated 
-start insulin gtt Metabolic acidosis (4/48/4214) - HCO3 on ABG 10. S/p bicarb x 2  
-start bicarb gtt 
-d/w daughter re: possible need for CVVH. She wanted to discuss with her family prior to making any decisions Lactic acidosis (6/20/2020) - likely 2/2 profound hypotension and anemia  
-trend lactate as BP's improve and as blood given Hyperkalemia (6/20/2020) - likely 2/2 profound acidosis and hyperglycemia -optimize HCO3 and BG Pneumonia due to COVID-19 virus (6/5/2020) - clinically was improving from this  
-s/p Remdisivir and Anthony LESLEY (acute kidney injury) ([facility] [address] (6/5/2020)/CKD  
-suspect Cr may significantly worsen due to severe hypotension  
-renal on board; may need CVVH/HD Total time spent with patient: 1 Hour 30 Minutes cc time Care Plan discussed with: Patient, Family, Nursing Staff, Consultant/Specialist, >50% of time spent in counseling and coordination of care and Anesthesia Discussed:  Code Status, Care Plan and D/C Planning Prophylaxis:  SCD's Disposition:  Prognosis guarded  
        
___________________________________________________ Attending Physician: Tashi Garcia MD

## 2020-06-20 NOTE — PROGRESS NOTES
Called and talked to Mr Alyssa Turner [phone]. Discussed about the prognosis of his mother-in-law and the need for CRRT or comfort care. This was also discussed by other physicians with him. He stated that he will discuss with his wife and let's know. 15:15 Spoke with Mr Alyssa Turner and informed me that pt's daughter doesn't want CRRT. I also talked to Dr Balaji Bernal

## 2020-06-20 NOTE — PROGRESS NOTES
[address] Dosing Services: Antimicrobial Stewardship Progress Note Consult for antibiotic dosing of vancomycin/cefepime by Dr. Bal Viera Pharmacist reviewed antibiotic appropriateness for 67year old , female  for indication of septic shock Day of Therapy 1 Plan: 
Vancomycin therapy: 
Start Vancomycin therapy, with loading dose of 1250 (mg) at 2300 06/19/20. Follow with maintenance dose of :  by random level. Dose calculated to approximate a therapeutic trough of 15-20 mcg/mL. Last trough level / Plan for level:  
Pharmacy to follow daily and will make changes to dose and/or frequency based on clinical status. Date Dose & Interval Measured (mcg/mL) Extrapolated (mcg/mL) ? ? ? ?  
? ? ? ?  
? ? ? ? Non-Kinetic Antimicrobial Dosing:  
Current Regimen:  Cefepime 1 gram IV q24h Recommendation:  
Other Antimicrobial 
(not dosed by pharmacist) Cultures Serum Creatinine Lab Results Component Value Date/Time Creatinine 4.93 (H) 06/19/2020 08:21 PM  
   
Creatinine Clearance Estimated Creatinine Clearance: 9.3 mL/min (A) (based on SCr of 4.93 mg/dL (H)). Procalcitonin Lab Results Component Value Date/Time Procalcitonin 0.12 06/05/2020 09:45 AM  
 
  
Temp  
(!) 96.1 °F (35.6 °C) WBC Lab Results Component Value Date/Time WBC 16.1 (H) 06/19/2020 04:35 AM  
   
H/H Lab Results Component Value Date/Time HGB 8.9 (L) 06/19/2020 04:35 AM  
  
Platelets Lab Results Component Value Date/Time PLATELET 075 93/69/1478 04:35 AM  
 
  
 
 
Pharmacist: Jesse information: [phone]

## 2020-06-20 NOTE — PROGRESS NOTES
[facility], Critical Care, [facility] Sleep Medicine Progress Note Name: Scott Sampson MRN: [id number] : 1945 Hospital: [address] [facility] Date: 2020 IMPRESSION:  
· Acute Respiratory Failure with Hypoxia; intubated  · Acute blood loss anemia · Shock · Left hip fracture · COVID-19+ · Elevated troponin · LESLEY, ? CKD · HTN 
· Hyperglycemia RECOMMENDATIONS:  
· Continue vent support, requiring FiO2 100% · Continue pressors to goal MAP 65 
· Continue bicarb · Antibiotics resumed given severity of illness, agree with continuing for now · S/p 3 unit PRBC · Trend H&H 
· Ortho consult · Too unstable for CT hip right now · Head CT post fall negative · Off heparin drip · Insulin drip · Stopped Anthony on  · Renal following. No urgent need for RRT. · Continue Dexamethasone for 10 days total. 
· Trend inflammatory markers · S/P course of Remdesivir; completed  · Continue vitamin C, zinc, lipitor · NPO 
· SCD Patient is critically ill and at high risk of decompensation. CCT 36 minutes Subjective:  
 
Ms. Dolphus Lombard is a 73yo female w/ history of HTN who presented to the ER on  w/ complaints of shortness of breath, weakness and chest pain. Sats 93% on RA. Has family members that are COVID+. Home med list includes 10mg prednisone that she has apparently been taking since April for \"pain. \" 
 
 Interval history Afebrile HR 70-80s; better Sats 95% on 1L NC 
WBC 16.1; better Hgb 8.9 Pro-BNP 2212; better ; worse CRP 5.8; better Feritin 443; worse D-dimer . 79; better Creat 3.74; better LFTs increased, , ALT 54 
200mL urine output documented : Eventful night. Patient got up in her room without assistance and fell, seemingly fracturing her left hip. Subsequently developed hypotension and AMS and a code blue was called.  Patient did not lose a pulse but did require intubation by anesthesia. Labs drawn revealed a hemoglobin of 3.6. Started on pressors and given 3 unit PRBC. This morning remains intubated, sedated on norepi and vasopressin. CXR 6/19: slight improvement in overall aeration CXR 6/16: slight improvement in bilateral infiltrates LE dopplers without obvious DVT, but cannot be ruled out on the left ECHO: EF 11-07%; grade 1 diastolic dysfunction ROS: Feels weak and still a little SOB. Past Medical History:  
Diagnosis Date  
 HTN (hypertension), benign No past surgical history on file. Prior to Admission medications Medication Sig Start Date End Date Taking? Authorizing Provider  
lisinopriL (PRINIVIL, ZESTRIL) 5 mg tablet Take 5 mg by mouth daily. Yes Provider, Historical  
predniSONE (DELTASONE) 5 mg tablet Take 10 mg by mouth daily. 6/2/20 7/2/20 Yes Provider, Historical  
 
No Known Allergies Social History Tobacco Use  Smoking status: Never Smoker  Smokeless tobacco: Never Used Substance Use Topics  Alcohol use: Never Frequency: Never Family History Problem Relation Age of Onset  Diabetes Neg Hx Current Facility-Administered Medications Medication Dose Route Frequency  pantoprazole (PROTONIX) 40 mg in 0.9% sodium chloride 10 mL injection  40 mg IntraVENous Q12H  propofol (DIPRIVAN) 10 mg/mL infusion  0-50 mcg/kg/min IntraVENous TITRATE  fentaNYL (PF) 1,500 mcg/30 mL (50 mcg/mL) infusion  0-200 mcg/hr IntraVENous TITRATE  
 NOREPINephrine (LEVOPHED) 8 mg in 5% dextrose 250mL (32 mcg/mL) infusion  0.5-30 mcg/min IntraVENous TITRATE  insulin regular (NOVOLIN R, HUMULIN R) 100 Units in 0.9% sodium chloride 100 mL infusion  0-50 Units/hr IntraVENous TITRATE  insulin lispro (HUMALOG) injection   SubCUTAneous TIDAC  vasopressin (VASOSTRICT) 20 Units in 0.9% sodium chloride 100 mL infusion  0-0.1 Units/min IntraVENous CONTINUOUS  
  sodium bicarbonate (8.4%) 150 mEq in sterile water 1,000 mL infusion   IntraVENous CONTINUOUS  
 PHENYLephrine (NATE-SYNEPHRINE) 100 mg in 0.9% sodium chloride 250 mL infusion   mcg/min IntraVENous TITRATE  cefepime (MAXIPIME) 1 g in 0.9% sodium chloride (MBP/ADV) 50 mL  1 g IntraVENous Q24H  
 vancomycin: Pharmacy dosing by random level   Other Rx Dosing/Monitoring  polyethylene glycol (MIRALAX) packet 17 g  17 g Oral BID  senna-docusate (PERICOLACE) 8.6-50 mg per tablet 1 Tab  1 Tab Oral DAILY  dexAMETHasone (DECADRON) tablet 10 mg  10 mg Oral DAILY  sodium chloride (NS) flush 5-40 mL  5-40 mL IntraVENous Q8H  
 ascorbic acid (vitamin C) (VITAMIN C) tablet 500 mg  500 mg Oral BID  zinc sulfate (ZINCATE) 220 (50) mg capsule 1 Cap  1 Cap Oral DAILY  atorvastatin (LIPITOR) tablet 40 mg  40 mg Oral DAILY  guaiFENesin ER (MUCINEX) tablet 1,200 mg  1,200 mg Oral BID Review of Systems: A comprehensive review of systems was negative except for that written in the HPI. Objective:  
Vital Signs:   
Visit Vitals BP 96/63 Pulse (!) 111 Temp 98.6 °F (37 °C) Resp 25 Ht 4' 11\" (1.499 m) Wt 78 kg (171 lb 15.3 oz) SpO2 91% BMI 34.73 kg/m² O2 Device: Endotracheal tube, Ventilator O2 Flow Rate (L/min): 7 l/min Temp (24hrs), Av °F (36.1 °C), Min:94.9 °F (34.9 °C), Max:98.6 °F (37 °C) Intake/Output:  
Last shift:       07 - 1900 In: 3942.6 [I.V.:3942.6] Out: 50 Last 3 shifts:  190 -  0700 In: 851.3 Out: 0 Intake/Output Summary (Last 24 hours) at 2020 1008 Last data filed at 2020 1000 Gross per 24 hour Intake 4793.9 ml Output 50 ml Net 4743.9 ml  
  
Physical Exam:  
General:  Intubated, sedated, no distress Head:  NCAT Eyes:  EOMI, conjunctive clear Nose: Nares normal, NC in place Throat: MMM Neck:  Supple, FROM, trachea midline Lungs:   Deferred Chest wall:  Normal shape, nontender Heart:  Deferred Abdomen:   Obese, soft, NT Extremities: No c/c/e Pulses: Deferred Skin: CDI, no rash Lymph nodes: Deferred Neurologic: Sedated Data review:  
 
Recent Results (from the past 24 hour(s)) GLUCOSE, POC Collection Time: 06/19/20 11:21 AM  
Result Value Ref Range Glucose (POC) 316 (H) 65 - 100 mg/dL Performed by Aristeo Wells (PCT) PTT Collection Time: 06/19/20 12:20 PM  
Result Value Ref Range aPTT 93.4 (HH) 22.1 - 32.0 sec  
 aPTT, therapeutic range     58.0 - 77.0 SECS  
GLUCOSE, POC Collection Time: 06/19/20  4:46 PM  
Result Value Ref Range Glucose (POC) 565 (H) 65 - 100 mg/dL Performed by Aristeo Wells (PCT) GLUCOSE, POC Collection Time: 06/19/20  4:48 PM  
Result Value Ref Range Glucose (POC) 591 (H) 65 - 100 mg/dL Performed by Aristeo Wells (PCT) EKG, 12 LEAD, INITIAL Collection Time: 06/19/20  7:16 PM  
Result Value Ref Range Ventricular Rate 89 BPM  
 Atrial Rate 89 BPM  
 P-R Interval 170 ms QRS Duration 78 ms Q-T Interval 346 ms  
 QTC Calculation (Bezet) 420 ms Calculated P Axis 38 degrees Calculated R Axis -5 degrees Calculated T Axis 48 degrees Diagnosis Normal sinus rhythm Low voltage QRS Borderline ECG When compared with ECG of 08-JUN-2020 09:36, No significant change was found CULTURE, BLOOD Collection Time: 06/19/20  8:21 PM  
Result Value Ref Range Special Requests: NO SPECIAL REQUESTS Culture result: NO GROWTH AFTER 7 HOURS METABOLIC PANEL, BASIC Collection Time: 06/19/20  8:21 PM  
Result Value Ref Range Sodium 139 136 - 145 mmol/L Potassium 6.1 (H) 3.5 - 5.1 mmol/L Chloride 108 97 - 108 mmol/L  
 CO2 10 (LL) 21 - 32 mmol/L Anion gap 21 (H) 5 - 15 mmol/L Glucose 646 (HH) 65 - 100 mg/dL BUN 83 (H) 6 - 20 MG/DL Creatinine 4.93 (H) 0.55 - 1.02 MG/DL  
 BUN/Creatinine ratio 17 12 - 20 GFR est AA 10 (L) >60 ml/min/1.73m2 GFR est non-AA 9 (L) >60 ml/min/1.73m2 Calcium 7.3 (L) 8.5 - 10.1 MG/DL MAGNESIUM Collection Time: 06/19/20  8:21 PM  
Result Value Ref Range Magnesium 2.6 (H) 1.6 - 2.4 mg/dL PHOSPHORUS Collection Time: 06/19/20  8:21 PM  
Result Value Ref Range Phosphorus 9.5 (H) 2.6 - 4.7 MG/DL  
HEMOGLOBIN A1C WITH EAG Collection Time: 06/19/20  8:21 PM  
Result Value Ref Range Hemoglobin A1c 7.9 (H) 4.0 - 5.6 % Est. average glucose 180 mg/dL LACTIC ACID Collection Time: 06/19/20  8:40 PM  
Result Value Ref Range Lactic acid 11.7 (HH) 0.4 - 2.0 MMOL/L  
GLUCOSE, POC Collection Time: 06/19/20  8:51 PM  
Result Value Ref Range Glucose (POC) >600 (HH) 65 - 100 mg/dL Performed by [address] [place] [address], ARTERIAL Collection Time: 06/19/20  9:20 PM  
Result Value Ref Range  
 pH 6.87 (LL) 7.35 - 7.45    
 PCO2 47 (H) 35.0 - 45.0 mmHg PO2 29 (LL) 80 - 100 mmHg O2 SAT 26 (L) 92 - 97 % BICARBONATE 8 (L) 22 - 26 mmol/L  
 BASE DEFICIT 25.3 mmol/L  
 O2 METHOD VENTILATOR    
 FIO2 80 % MODE A/C Tidal volume 450 SET RATE 18    
 EPAP/CPAP/PEEP 8 Sample source VENOUS    
 SITE LR    
 PETER'S TEST N/A Critical value read back [address] [facility] Collection Time: 06/19/20  9:30 PM  
Result Value Ref Range Glucose 600 mg/dL Insulin order 10.8 units/hour Insulin adminstered 10.8 units/hour Multiplier 0.020 Low target 200 mg/dL High target 300 mg/dL D50 order 0.0 ml  
 D50 administered 0.00 ml Minutes until next BG 60 min Order initials CAM   
 Administered initials CAM   
 GLSCOM Comments PTT Collection Time: 06/19/20  9:33 PM  
Result Value Ref Range aPTT 49.9 (H) 22.1 - 32.0 sec  
 aPTT, therapeutic range     58.0 - 77.0 SECS  
CULTURE, BLOOD Collection Time: 06/19/20  9:50 PM  
Result Value Ref Range Special Requests: NO SPECIAL REQUESTS  Culture result: NO GROWTH AFTER 6 HOURS    
 GLUCOSE, POC Collection Time: 06/19/20 10:36 PM  
Result Value Ref Range Glucose (POC) 581 (H) 65 - 100 mg/dL Performed by [address] [place] [address] Dr Collection Time: 06/19/20 10:38 PM  
Result Value Ref Range Glucose 581 mg/dL Insulin order 15.6 units/hour Insulin adminstered 15.6 units/hour Multiplier 0.030 Low target 200 mg/dL High target 300 mg/dL D50 order 0.0 ml  
 D50 administered 0.00 ml Minutes until next BG 60 min Order initials pg Administered initials pg GLSCOM Comments MAGNESIUM Collection Time: 06/19/20 10:45 PM  
Result Value Ref Range Magnesium 2.2 1.6 - 2.4 mg/dL METABOLIC PANEL, BASIC Collection Time: 06/19/20 10:45 PM  
Result Value Ref Range Sodium 143 136 - 145 mmol/L Potassium 5.0 3.5 - 5.1 mmol/L Chloride 113 (H) 97 - 108 mmol/L  
 CO2 12 (LL) 21 - 32 mmol/L Anion gap 18 (H) 5 - 15 mmol/L Glucose 531 (H) 65 - 100 mg/dL BUN 73 (H) 6 - 20 MG/DL Creatinine 4.27 (H) 0.55 - 1.02 MG/DL  
 BUN/Creatinine ratio 17 12 - 20 GFR est AA 12 (L) >60 ml/min/1.73m2 GFR est non-AA 10 (L) >60 ml/min/1.73m2 Calcium 6.6 (L) 8.5 - 10.1 MG/DL  
CBC WITH AUTOMATED DIFF Collection Time: 06/19/20 10:45 PM  
Result Value Ref Range WBC 21.1 (H) 3.6 - 11.0 K/uL  
 RBC 1.23 (L) 3.80 - 5.20 M/uL HGB 3.6 (LL) 11.5 - 16.0 g/dL HCT 12.5 (LL) 35.0 - 47.0 % .6 (H) 80.0 - 99.0 FL  
 MCH 29.3 26.0 - 34.0 PG  
 MCHC 28.8 (L) 30.0 - 36.5 g/dL  
 RDW 14.7 (H) 11.5 - 14.5 % PLATELET 917 423 - 983 K/uL MPV 10.3 8.9 - 12.9 FL  
 NRBC 1.5 (H) 0  WBC ABSOLUTE NRBC 0.31 (H) 0.00 - 0.01 K/uL NEUTROPHILS 78 (H) 32 - 75 % BAND NEUTROPHILS 8 (H) 0 - 6 % LYMPHOCYTES 11 (L) 12 - 49 % MONOCYTES 1 (L) 5 - 13 % EOSINOPHILS 0 0 - 7 % BASOPHILS 0 0 - 1 % MYELOCYTES 2 (H) 0 % IMMATURE GRANULOCYTES 0 %  
 ABS. NEUTROPHILS 18.1 (H) 1.8 - 8.0 K/UL  
 ABS. LYMPHOCYTES 2.3 0.8 - 3.5 K/UL ABS. MONOCYTES 0.2 0.0 - 1.0 K/UL  
 ABS. EOSINOPHILS 0.0 0.0 - 0.4 K/UL  
 ABS. BASOPHILS 0.0 0.0 - 0.1 K/UL  
 ABS. IMM. GRANS. 0.0 K/UL  
 DF MANUAL    
 RBC COMMENTS ANISOCYTOSIS 1+ 
    
 RBC COMMENTS GILES CELLS 
PRESENT 
    
 RBC COMMENTS POLYCHROMASIA PRESENT 
    
LACTIC ACID Collection Time: 06/19/20 10:45 PM  
Result Value Ref Range Lactic acid 11.0 (HH) 0.4 - 2.0 MMOL/L  
TROPONIN I Collection Time: 06/19/20 10:45 PM  
Result Value Ref Range Troponin-I, Qt. 0.16 (H) <0.05 ng/mL NT-PRO BNP Collection Time: 06/19/20 10:45 PM  
Result Value Ref Range NT pro-BNP 1,397 (H) <450 PG/ML  
BLOOD GAS, ARTERIAL Collection Time: 06/19/20 11:36 PM  
Result Value Ref Range pH 7.15 (LL) 7.35 - 7.45    
 PCO2 31 (L) 35.0 - 45.0 mmHg PO2 45 (LL) 80 - 100 mmHg O2 SAT 69 (L) 92 - 97 % BICARBONATE 11 (L) 22 - 26 mmol/L  
 BASE DEFICIT 16.9 mmol/L  
 O2 METHOD VENTILATOR    
 FIO2 60 % MODE A/C Tidal volume 450 SET RATE 25    
 EPAP/CPAP/PEEP 8 Sample source VENOUS    
 SITE RB    
 PETER'S TEST N/A Critical value read back  RN   
GLUCOSE, POC Collection Time: 06/20/20 12:05 AM  
Result Value Ref Range Glucose (POC) 497 (H) 65 - 100 mg/dL Performed by Steffany Noel Collection Time: 06/20/20 12:07 AM  
Result Value Ref Range Glucose 497 mg/dL Insulin order 17.5 units/hour Insulin adminstered 17.5 units/hour Multiplier 0.040 Low target 200 mg/dL High target 300 mg/dL D50 order 0.0 ml  
 D50 administered 0.00 ml Minutes until next BG 60 min Order initials pg Administered initials pg GLSCOM Comments METABOLIC PANEL, BASIC Collection Time: 06/20/20 12:16 AM  
Result Value Ref Range Sodium 144 136 - 145 mmol/L Potassium 4.7 3.5 - 5.1 mmol/L Chloride 113 (H) 97 - 108 mmol/L  
 CO2 13 (LL) 21 - 32 mmol/L Anion gap 18 (H) 5 - 15 mmol/L Glucose 443 (H) 65 - 100 mg/dL BUN 75 (H) 6 - 20 MG/DL Creatinine 4.19 (H) 0.55 - 1.02 MG/DL  
 BUN/Creatinine ratio 18 12 - 20 GFR est AA 13 (L) >60 ml/min/1.73m2 GFR est non-AA 10 (L) >60 ml/min/1.73m2 Calcium 7.0 (L) 8.5 - 10.1 MG/DL MAGNESIUM Collection Time: 06/20/20 12:16 AM  
Result Value Ref Range Magnesium 2.2 1.6 - 2.4 mg/dL TYPE + CROSSMATCH Collection Time: 06/20/20 12:16 AM  
Result Value Ref Range Crossmatch Expiration 06/23/2020 ABO/Rh(D) A POSITIVE Antibody screen NEG Unit number [id number] Blood component type  LR Unit division 00 Status of unit REL FROM [facility] Crossmatch result Emergency Release Unit number [id number] Blood component type  LR Unit division 00 Status of unit REL FROM [facility] Crossmatch result Emergency Release Unit number [id number] Blood component type  LR Unit division 00 Status of unit ISSUED Crossmatch result Compatible Unit number [id number] Blood component type  LR Unit division 00 Status of unit ISSUED Crossmatch result Compatible Unit number [id number] Blood component type RC LR Unit division 00 Status of unit ISSUED Crossmatch result Compatible PROTHROMBIN TIME + INR Collection Time: 06/20/20 12:16 AM  
Result Value Ref Range INR 1.5 (H) 0.9 - 1.1 Prothrombin time 15.4 (H) 9.0 - 11.1 sec PTT Collection Time: 06/20/20 12:16 AM  
Result Value Ref Range aPTT 42.8 (H) 22.1 - 32.0 sec  
 aPTT, therapeutic range     58.0 - 77.0 SECS  
GLUCOSE, POC Collection Time: 06/20/20  1:06 AM  
Result Value Ref Range Glucose (POC) 463 (H) 65 - 100 mg/dL Performed by Gregory Mcdermott Collection Time: 06/20/20  1:10 AM  
Result Value Ref Range Glucose 463 mg/dL Insulin order 20.2 units/hour Insulin adminstered 20.2 units/hour Multiplier 0.050 Low target 200 mg/dL High target 300 mg/dL D50 order 0.0 ml  
 D50 administered 0.00 ml Minutes until next BG 60 min Order initials pg Administered initials pg GLSCOM Comments GLUCOSE, POC Collection Time: 06/20/20  2:02 AM  
Result Value Ref Range Glucose (POC) 443 (H) 65 - 100 mg/dL Performed by [facility] Collection Time: 06/20/20  2:03 AM  
Result Value Ref Range Glucose 443 mg/dL Insulin order 23.0 units/hour Insulin adminstered 23.0 units/hour Multiplier 0.060 Low target 200 mg/dL High target 300 mg/dL D50 order 0.0 ml  
 D50 administered 0.00 ml Minutes until next BG 60 min Order initials pg Administered initials pg GLSCOM Comments GLUCOSE, POC Collection Time: 06/20/20  3:01 AM  
Result Value Ref Range Glucose (POC) 377 (H) 65 - 100 mg/dL Performed by [facility] Collection Time: 06/20/20  3:03 AM  
Result Value Ref Range Glucose 377 mg/dL Insulin order 22.2 units/hour Insulin adminstered 22.2 units/hour Multiplier 0.070 Low target 200 mg/dL High target 300 mg/dL D50 order 0.0 ml  
 D50 administered 0.00 ml Minutes until next BG 60 min Order initials pg Administered initials pg GLSCOM Comments GLUCOSE, POC Collection Time: 06/20/20  4:01 AM  
Result Value Ref Range Glucose (POC) 313 (H) 65 - 100 mg/dL Performed by [facility] Harriett Nabor Collection Time: 06/20/20  4:02 AM  
Result Value Ref Range Glucose 313 mg/dL Insulin order 20.2 units/hour Insulin adminstered 20.2 units/hour Multiplier 0.080 Low target 200 mg/dL High target 300 mg/dL D50 order 0.0 ml  
 D50 administered 0.00 ml Minutes until next BG 60 min Order initials pg Administered initials pg GLSCOM Comments PROTHROMBIN TIME + INR Collection Time: 06/20/20  4:44 AM  
Result Value Ref Range INR 1.5 (H) 0.9 - 1.1 Prothrombin time 15.1 (H) 9.0 - 11.1 sec FIBRINOGEN Collection Time: 06/20/20  4:44 AM  
Result Value Ref Range Fibrinogen 146 (L) 200 - 475 mg/dL D DIMER Collection Time: 06/20/20  4:44 AM  
Result Value Ref Range D-dimer 2.97 (H) 0.00 - 0.65 mg/L FEU  
CBC WITH AUTOMATED DIFF Collection Time: 06/20/20  4:44 AM  
Result Value Ref Range WBC 24.1 (H) 3.6 - 11.0 K/uL  
 RBC 3.86 3.80 - 5.20 M/uL  
 HGB 11.6 11.5 - 16.0 g/dL HCT 34.4 (L) 35.0 - 47.0 % MCV 89.1 80.0 - 99.0 FL  
 MCH 30.1 26.0 - 34.0 PG  
 MCHC 33.7 30.0 - 36.5 g/dL  
 RDW 14.0 11.5 - 14.5 % PLATELET 078 (L) 395 - 400 K/uL MPV 10.7 8.9 - 12.9 FL  
 NRBC 2.0 (H) 0  WBC ABSOLUTE NRBC 0.48 (H) 0.00 - 0.01 K/uL NEUTROPHILS 79 (H) 32 - 75 % LYMPHOCYTES 10 (L) 12 - 49 % MONOCYTES 5 5 - 13 % EOSINOPHILS 0 0 - 7 % BASOPHILS 0 0 - 1 % METAMYELOCYTES 5 (H) 0 % MYELOCYTES 1 (H) 0 % IMMATURE GRANULOCYTES 0 %  
 ABS. NEUTROPHILS 19.0 (H) 1.8 - 8.0 K/UL  
 ABS. LYMPHOCYTES 2.4 0.8 - 3.5 K/UL  
 ABS. MONOCYTES 1.2 (H) 0.0 - 1.0 K/UL  
 ABS. EOSINOPHILS 0.0 0.0 - 0.4 K/UL  
 ABS. BASOPHILS 0.0 0.0 - 0.1 K/UL  
 ABS. IMM. GRANS. 0.0 K/UL  
 DF MANUAL PLATELET COMMENTS Giant platelets RBC COMMENTS ANISOCYTOSIS 1+ 
    
 RBC COMMENTS POLYCHROMASIA PRESENT 
    
 RBC COMMENTS GILES CELLS 
PRESENT 
    
METABOLIC PANEL, COMPREHENSIVE Collection Time: 06/20/20  4:44 AM  
Result Value Ref Range Sodium 143 136 - 145 mmol/L Potassium 4.1 3.5 - 5.1 mmol/L Chloride 114 (H) 97 - 108 mmol/L  
 CO2 17 (L) 21 - 32 mmol/L Anion gap 12 5 - 15 mmol/L Glucose 275 (H) 65 - 100 mg/dL BUN 72 (H) 6 - 20 MG/DL Creatinine 4.14 (H) 0.55 - 1.02 MG/DL  
 BUN/Creatinine ratio 17 12 - 20 GFR est AA 13 (L) >60 ml/min/1.73m2 GFR est non-AA 10 (L) >60 ml/min/1.73m2 Calcium 6.8 (L) 8.5 - 10.1 MG/DL  Bilirubin, total 0.7 0.2 - 1.0 MG/DL  
 ALT (SGPT) 94 (H) 12 - 78 U/L  
 AST (SGOT) 256 (H) 15 - 37 U/L  
 Alk. phosphatase 99 45 - 117 U/L Protein, total 4.4 (L) 6.4 - 8.2 g/dL Albumin 2.3 (L) 3.5 - 5.0 g/dL Globulin 2.1 2.0 - 4.0 g/dL A-G Ratio 1.1 1.1 - 2.2 C REACTIVE PROTEIN, QT Collection Time: 06/20/20  4:44 AM  
Result Value Ref Range C-Reactive protein 1.66 (H) 0.00 - 0.60 mg/dL LD Collection Time: 06/20/20  4:44 AM  
Result Value Ref Range  (H) 81 - 246 U/L  
MAGNESIUM Collection Time: 06/20/20  4:44 AM  
Result Value Ref Range Magnesium 1.9 1.6 - 2.4 mg/dL NT-PRO BNP Collection Time: 06/20/20  4:44 AM  
Result Value Ref Range NT pro-BNP 2,002 (H) <450 PG/ML  
PHOSPHORUS Collection Time: 06/20/20  4:44 AM  
Result Value Ref Range Phosphorus 4.5 2.6 - 4.7 MG/DL  
GLUCOSE, POC Collection Time: 06/20/20  5:05 AM  
Result Value Ref Range Glucose (POC) 310 (H) 65 - 100 mg/dL Performed by Bonnie Elder Collection Time: 06/20/20  5:11 AM  
Result Value Ref Range Glucose 310 mg/dL Insulin order 22.5 units/hour Insulin adminstered 22.5 units/hour Multiplier 0.090 Low target 200 mg/dL High target 300 mg/dL D50 order 0.0 ml  
 D50 administered 0.00 ml Minutes until next BG 60 min Order initials pg Administered initials pg GLSCOM Comments SAMPLES BEING HELD Collection Time: 06/20/20  5:20 AM  
Result Value Ref Range SAMPLES BEING HELD 2SST'S   
 COMMENT Add-on orders for these samples will be processed based on acceptable specimen integrity and analyte stability, which may vary by analyte. LACTIC ACID Collection Time: 06/20/20  5:23 AM  
Result Value Ref Range Lactic acid 3.9 (HH) 0.4 - 2.0 MMOL/L  
GLUCOSE, POC Collection Time: 06/20/20  6:14 AM  
Result Value Ref Range Glucose (POC) 236 (H) 65 - 100 mg/dL Performed by Bonnie Elder Collection Time: 06/20/20  6:15 AM  
Result Value Ref Range Glucose 236 mg/dL Insulin order 15.8 units/hour Insulin adminstered 15.8 units/hour Multiplier 0.090 Low target 200 mg/dL High target 300 mg/dL D50 order 0.0 ml  
 D50 administered 0.00 ml Minutes until next BG 60 min Order initials pg Administered initials pg GLSCOM Comments GLUCOSE, POC Collection Time: 06/20/20  7:02 AM  
Result Value Ref Range Glucose (POC) 234 (H) 65 - 100 mg/dL Performed by Luis Antonio Cabrera Carrier Collection Time: 06/20/20  7:03 AM  
Result Value Ref Range Glucose 234 mg/dL Insulin order 15.7 units/hour Insulin adminstered 15.7 units/hour Multiplier 0.090 Low target 200 mg/dL High target 300 mg/dL D50 order 0.0 ml  
 D50 administered 0.00 ml Minutes until next BG 60 min Order initials pg Administered initials pg GLSCOM Comments BLOOD GAS, ARTERIAL Collection Time: 06/20/20  7:33 AM  
Result Value Ref Range pH 7.36 7.35 - 7.45    
 PCO2 25 (L) 35.0 - 45.0 mmHg PO2 57 (L) 80 - 100 mmHg O2 SAT 89 (L) 92 - 97 % BICARBONATE 14 (L) 22 - 26 mmol/L  
 BASE DEFICIT 9.2 mmol/L  
 O2 METHOD VENTILATOR    
 FIO2 80 % MODE OTHER Tidal volume 450 SET RATE 25    
 EPAP/CPAP/PEEP 8 Sample source ARTERIAL    
 SITE LEFT RADIAL PETER'S TEST YES    
GLUCOSE, POC Collection Time: 06/20/20  8:09 AM  
Result Value Ref Range Glucose (POC) 185 (H) 65 - 100 mg/dL Performed by Jack Cabrera Carrier Collection Time: 06/20/20  8:09 AM  
Result Value Ref Range Glucose 185 mg/dL Insulin order 9.0 units/hour Insulin adminstered 9.0 units/hour Multiplier 0.072 Low target 200 mg/dL High target 300 mg/dL D50 order 0.0 ml  
 D50 administered 0.00 ml Minutes until next BG 60 min Order initials DC Administered initials DC   
 GLSCOM Comments METABOLIC PANEL, BASIC Collection Time: 06/20/20  9:05 AM  
Result Value Ref Range Sodium 144 136 - 145 mmol/L Potassium 4.0 3.5 - 5.1 mmol/L Chloride 113 (H) 97 - 108 mmol/L  
 CO2 20 (L) 21 - 32 mmol/L Anion gap 11 5 - 15 mmol/L Glucose 130 (H) 65 - 100 mg/dL BUN 73 (H) 6 - 20 MG/DL Creatinine 4.15 (H) 0.55 - 1.02 MG/DL  
 BUN/Creatinine ratio 18 12 - 20 GFR est AA 13 (L) >60 ml/min/1.73m2 GFR est non-AA 10 (L) >60 ml/min/1.73m2 Calcium 6.7 (L) 8.5 - 10.1 MG/DL MAGNESIUM Collection Time: 06/20/20  9:05 AM  
Result Value Ref Range Magnesium 2.1 1.6 - 2.4 mg/dL HEMOGLOBIN Collection Time: 06/20/20  9:05 AM  
Result Value Ref Range HGB 11.7 11.5 - 16.0 g/dL GLUCOSE, POC Collection Time: 06/20/20  9:07 AM  
Result Value Ref Range Glucose (POC) 174 (H) 65 - 100 mg/dL Performed by Jody Leonard Collection Time: 06/20/20  9:08 AM  
Result Value Ref Range Glucose 174 mg/dL Insulin order 6.6 units/hour Insulin adminstered 6.6 units/hour Multiplier 0.058 Low target 200 mg/dL High target 300 mg/dL D50 order 0.0 ml  
 D50 administered 0.00 ml Minutes until next BG 60 min Order initials DC Administered initials DC   
 GLSCOM Comments LACTIC ACID Collection Time: 06/20/20  9:10 AM  
Result Value Ref Range Lactic acid 3.2 (HH) 0.4 - 2.0 MMOL/L Imaging: 
I have personally reviewed the patients radiographs and have reviewed the reports: 
CXR (6/8/2020): There are diffuse patchy bilateral infiltrates, increased in density as compared to the prior study. CXR (6/12/2020): There is patchy bilateral upper lobe airspace disease that has increased as compared to the prior study. CXR 6/16:  Slight improvement in bilateral infiltration Electa Roots, DO

## 2020-06-20 NOTE — PROGRESS NOTES
0700- Bedside and Verbal shift change report given to Sada Valdes RN (oncoming nurse) by Navi Sylvester RN (offgoing nurse). Report included the following information SBAR, Kardex, ED Summary, Procedure Summary, Intake/Output, MAR, Recent Results, Cardiac Rhythm NSR and Alarm Parameters . Primary Nurse Shanta Anders and Navi Sylvester, RN performed a dual skin assessment on this patient Impairment noted- see wound doc flow sheet- Left Knee, Right Elbow, Left Cheek- from previous fall Sixto score is 11 Received patient intubated and sedated. Pupils reactive to light, brisk round, scleral edema noted. Assist control noted- FiO2 80%, Rate 25, , PEEP 8. Coarse lung sounds noted, dimished at bases, thick mucus noted on suction. ET Tube 22 @ teeth, OG 71@ lip- to suction. Patient not following commands for this writer, responds to pain and stimuli. Abdomen large round intact hypoactive BS noted x4 quadrants. Noted large lower mid abdominal ecchymosis and more bruising to left side. Edema noted to UE +1, no edema to LE. RTLC noted, C/D/I. Levo @ 11, Fen @ 100, Clinton @ 210, Prop @ 30, Bicarb @ 100, Vasopressin @ 0.04, Insulin running @ 15.7 to JAROD, monitoring BS q1. Bed locked in lowest position. CVP running- 19. 
0745- Repositioned to right side. HOB 30 degrees. 0800- Mouth care and suction completed- noted thick moderate secretions and SPO2 was 88-89, dropped as low as 86, but cleared airway with suction. Pt eyes moving during mouth care, but did not fully open. Restraints in place to B/L wrists for safety- removed for ROM, no skin breakdown noted. 0809- Insulin gtt decreased to 9. 
0815- Pt now 94% SPO2, FiO2 now 100%, Rate 20 per RT. Removed OG from suction and discuss with MD if can be used for medications. 200- To complete bloodwork when in to complete glucostabilizer to cluster care. Rate increased back to 25 per Dr. Eliud Peñaloza, RT aware. 80- Spoke to Dr. Eliud Peñaloza- hold meds until MD has seen pt. 0400- Insulin rate changed to 6.6. May give meds OG. 
0915- Labs drawn per order- Lactic, CBC, and BMP/Mag 
0925- To reduce propofol to meet target RASS, pt -3 now. 0930- Turned to left side. HOB 45, heel boots in place. Order for lactic placed for q6. 
0957- Lactic Acid 3.2- will continue to trend q6. 
1000- Suction and mouth care completed, tolerated well. Restraints remain in place for safety, ROM performed, no skin breakdown noted. 1006- As per Dr. Alena Keenan- consult Ortho. Unstable for CT or surgery though at this point. Aware of lactic, Hgb, and kidney levels. 1013- Insulin rate change to 2.9. 
1020- Reducing propofol to 20. 
1030- Consult put in for orthopedics, update provided- unstable to go to CT at this time. 3314 Reynaldo Horvath aware of consult per Dr. Wilbert Gonzales, agrees to hold off on any intervention at this time due to un stability. 1100- Fentanyl changed, rate reduced. Patient RASS remains -3, not meeting goal, despite talking to patient and turning, unable to arouse like this morning, will continue to wean sedation. 1110- Insulin gtt changed to 2.2. Wound care consult placed for low wei score, intubation, and post fall wound follow up. 1120- placed supine. Unable to send urine due to low urine output at this time. 1135- Reducing propofol; RASS remains -3; continue sedation weaning. Redressed Right arm skin tear- alginate and foam placed, noted small amount bloody drainage, pink tissue noted. 1200- Suction and mouth care completed, tolerated well. Restraints remain in place for safety, ROM performed, no skin breakdown noted. 1205- BS 93- rate decreased to 0.9. 
1217- Stopped Propofol, increasing Clinton and Levo. 1238- Spoke to Dr. Balaji Bernal- MD spoke to son, advising for CRRT; no urgency to start right away, but will need it in the future due to oliguria. Will perfect serve when pt's family decides. 1252- Reducing fentanyl to 50 at this time for RASS -3. 1300- Checking BS and sending down CMP/Mag per order. 1310- Insulin gtt running @ 0.7 now. Removed CVP- not needed per pulmonary. When moving patient, eyes opened to stimulation, not following commands, but not restless. Eyes closed now. 1320- Keep sedation- Fen @ 50 and propofol off, as long as patient does not get restless. Levophed increased to 15. 
1352- Noted patient's eyes open, calm, but restarted propofol @ 5, starting to move. Avoiding any interference with care. 1400- Becoming slightly agitated, moving right arm, restraint tightened and pt calmed told in Malay she is good. patient calm, closing eyes. New bag of Clinton placed. Insulin to be adjusted to 0.2- BG 89.  
1410- eyes closing. Propofol has been placed back on previously to help. Mouth care and suction completed. Restraints remain in place, no skinbreakdown noted. Pt turned to right with turn assist on bed and pillow wedge 1430- Message sent to Dr. Richardson regarding insulin gtt- anion gap has been close x3, and glucose has been <150. Dr. Richardson reached out to family again regarding CRT. 46- Dr. Richardson to adjust orders, may stop insulin gtt. 1500- Lab work sent per order. Insulin gtt removed. Pt calm RASS -1. Placed supine to help with O2. 
1515- Noted from Dr. Nina Pfeiffer note that pt's daughter does not want CRRT. Dr. Fabian Marshall aware as per Dr. Nina Pfeiffer note. Will continue supportive measures at this time, and see if family is looking towards comfort care in future. 1522- Levophed increased. 1540- RT in room to check on patient and potentially change ventilator settings to help increase SPO2. 
1600- Pt turned to right side. Noted darkened ecchymosis to abdomen, hgb 10.4, semi-soft abdomen, but BS more active than previous. Some noted tenderness when palpated by patient. Bps still unstable to have CT complete. Pt RASS -1, eyes open with stimulation/voice, but then drowsy and goes back to sleep.  Attempting to keep sedation lower to avoid dropping BP more if able to. Increasing Clinton. Mouth care completed and suction. Restraints in place for safety, tolerating well, less restless, no attempts to pull at this time, ROM completed- no skin breakdown noted. Doug Sr regarding Hgb of 10.4 and Lactic increase of 3.7. NNO at this time, continue to complete q6 hr. 
1628- Increased propofol to 15- pt's eyes open spontaneous and moving up in bed 
1700- Pt comfortable with increase in propofol @ 15- RASS -1. [place] 1710- BG 61- to provide with amp d50, pt is alert to voice and spontaneous, but drowsy RASS -1. 
1718- 25 gm of D50 given per order. To check BG in 10-15 minutes. HR seems to have decreased some with addition of D50 amp. 
1725- BG recheck- 160. 
1730- HR better and BP post D50 amp. 1800- Mouth care and suction completed, Restraints in place for safety, ROM per formed, no skin breakdown noted to B/L Wrists. Pt supine. Levophed increased to 25. 
1806- Dr. Lux Sr provided with interpretor evening/weekend number to talk with family. 1287- Dr. Lux Sr attempted x3 to call interpretor hotline, but unable to get anyone via phone. Spoke to [facility]- to see if anyone in house can interpret or utilize computer so that family and Dr. Lux Sr can have conversation regarding goals of care for patient. 1830- interpretor computer brought up by supervisor. Danya Kelly made aware of consistent low Bps, almost maxed on clinton and levo, can increase titration of levophed to 200. Aware of events this afternoon, and patient at this time denying CRRT. Lily Sr up on unit, provided with introduction on computer interpretor, to utilize when family comes in waiting room. If they decide to go to comfort, may allow 1 at a time to see patient. 1845- Increasing Clinton and levo to obtain MAP >65. Pt still RASS -1 with movement/voice.  
1900- Bedside and Verbal shift change report given to Nannette Ortiz RN (oncoming nurse) by Chapman Kussmaul (offgoing nurse). Report included the following information SBAR, Kardex, ED Summary, Procedure Summary, Intake/Output, MAR, Recent Results, Cardiac Rhythm Sinus Tach and Alarm Parameters .

## 2020-06-20 NOTE — PROGRESS NOTES
Suresh Praterelsen [facility] 
[address] [facility], [place], [address] [place] [address] 
[phone] Medical Progress Note NAME: Elizabeth Nicholson :  1945 MRM:  [id number] Date of service: 2020  7:56 AM 
 
  
Assessment and Plan: 1. Acute respiratory failure with hypoxia: due to COVID-19/ sepsis. Now intubated. Continue vent management per pulmonary. RRT then code blue was called on 20 due to hypoxia and hypotension. Pt has been on heparin drip until yesterday due to high D dimer. 2.  Shock. likely septic/ hypovolemic. On multiple pressors. Check cultures and continue empiric ABx. 3.  Metabolic acidosis (7494) / DKA/ Lactic acidosis (2020) - continue insulin drip, trend lactate. S/p bicarb x 2 and continue bicarb gtt 4. Acute blood loss anemia (2020) - unclear etiology. Due to the acuity of the onset and temporal relation to the fall, suspect 2/2 fracture of her L femur. Unfortunately has been too unstable to obtain CT of the L hip. S/p 3 units of PRBC transfusion. Check serial H/H. Heparin drip was stopped, which was started empirically due to high D dimer 5. LESLEY (acute kidney injury) ([facility] [address] (2020)/CKD. Likely secondary to severe hypotension. Renal on board. May need CVVH 6. Pneumonia due to COVID-19 virus (2020) - clinically was improving from this -s/p Remdisivir and Anthony. CXR: Unchanged multilobar airspace disease. Continue vitamin C, zinc, statin. Completed azithromycin. Follow inflammatory markers. Was on IV heparin gtt high risk for PE due to rising D dimer. BLE duplex negative for DVT. On dexamethasone, started on  
  
7. HTN (hypertension), benign: hold to Norvasc due to low BP 
 
8  Transaminitis. likely due to COVID-19. Continue to monitor. 9.  LT hip fracture after a fall. ortho following. Not stable to check CT scan Subjective: Chief Complaint[de-identified] Patient was seen and examined as a follow up for COVID 19.  Chart was reviewed. Intubated and sedated Pt was not seen in the room to preserve PPE Objective:  
 
Last 24hrs VS reviewed since prior progress note. Most recent are: 
 
Visit Vitals BP 97/72 Pulse 100 Temp 98.4 °F (36.9 °C) Resp 25 Ht 4' 11\" (1.499 m) Wt 78 kg (171 lb 15.3 oz) SpO2 91% BMI 34.73 kg/m² SpO2 Readings from Last 6 Encounters:  
06/20/20 91% O2 Flow Rate (L/min): 7 l/min Intake/Output Summary (Last 24 hours) at 6/20/2020 5251 Last data filed at 6/20/2020 8462 Gross per 24 hour Intake 851.3 ml Output  Net 851.3 ml Physical Exam: 
 
Gen:  Well-developed, well-nourished, intubated and sedated Resp:  Moves with respiration, intubated. Abd:  Moves with respiration Neuro:  Intubated and sedated Psych: intubated and sedated  
__________________________________________________________________ Medications Reviewed: (see below) Medications:  
 
Current Facility-Administered Medications Medication Dose Route Frequency  pantoprazole (PROTONIX) 40 mg in 0.9% sodium chloride 10 mL injection  40 mg IntraVENous Q12H  
 0.9% sodium chloride infusion  100 mL/hr IntraVENous CONTINUOUS  
 oxyCODONE-acetaminophen (PERCOCET) 5-325 mg per tablet 1 Tab  1 Tab Oral Q6H PRN  propofol (DIPRIVAN) 10 mg/mL infusion  0-50 mcg/kg/min IntraVENous TITRATE  fentaNYL (PF) 1,500 mcg/30 mL (50 mcg/mL) infusion  0-200 mcg/hr IntraVENous TITRATE  
 NOREPINephrine (LEVOPHED) 8 mg in 5% dextrose 250mL (32 mcg/mL) infusion  0.5-30 mcg/min IntraVENous TITRATE  insulin regular (NOVOLIN R, HUMULIN R) 100 Units in 0.9% sodium chloride 100 mL infusion  0-50 Units/hr IntraVENous TITRATE  insulin lispro (HUMALOG) injection   SubCUTAneous TIDAC  glucose chewable tablet 16 g  4 Tab Oral PRN  
 dextrose (D50W) injection syrg 12.5-25 g  25-50 mL IntraVENous PRN  
 glucagon (GLUCAGEN) injection 1 mg  1 mg IntraMUSCular PRN  
  vasopressin (VASOSTRICT) 20 Units in 0.9% sodium chloride 100 mL infusion  0-0.1 Units/min IntraVENous CONTINUOUS  
 sodium bicarbonate (8.4%) 150 mEq in sterile water 1,000 mL infusion   IntraVENous CONTINUOUS  
 PHENYLephrine (NATE-SYNEPHRINE) 100 mg in 0.9% sodium chloride 250 mL infusion   mcg/min IntraVENous TITRATE  cefepime (MAXIPIME) 1 g in 0.9% sodium chloride (MBP/ADV) 50 mL  1 g IntraVENous Q24H  
 vancomycin: Pharmacy dosing by random level   Other Rx Dosing/Monitoring  0.9% sodium chloride infusion 250 mL  250 mL IntraVENous PRN  polyethylene glycol (MIRALAX) packet 17 g  17 g Oral BID  senna-docusate (PERICOLACE) 8.6-50 mg per tablet 1 Tab  1 Tab Oral DAILY  dexAMETHasone (DECADRON) tablet 10 mg  10 mg Oral DAILY  phenyleph-min oil-petrolatum (PREPARATION H) 0.25-14-74.9 % ointment   PeriANAL QID PRN  
 sodium chloride (NS) flush 5-40 mL  5-40 mL IntraVENous Q8H  
 sodium chloride (NS) flush 5-40 mL  5-40 mL IntraVENous PRN  
 acetaminophen (TYLENOL) tablet 650 mg  650 mg Oral Q4H PRN  
 naloxone (NARCAN) injection 0.4 mg  0.4 mg IntraVENous PRN  
 ondansetron (ZOFRAN) injection 4 mg  4 mg IntraVENous Q4H PRN  
 morphine injection 2 mg  2 mg IntraVENous Q4H PRN  
 ascorbic acid (vitamin C) (VITAMIN C) tablet 500 mg  500 mg Oral BID  zinc sulfate (ZINCATE) 220 (50) mg capsule 1 Cap  1 Cap Oral DAILY  atorvastatin (LIPITOR) tablet 40 mg  40 mg Oral DAILY  guaiFENesin ER (MUCINEX) tablet 1,200 mg  1,200 mg Oral BID Lab Data Reviewed: (see below) Lab Review:  
 
Recent Labs  
  06/20/20 
0444 06/19/20 2245 06/19/20 0435 WBC 24.1* 21.1* 16.1* HGB 11.6 3.6* 8.9* HCT 34.4* 12.5* 27.8*  
* 175 292 Recent Labs  
  06/20/20 
0444 06/20/20 
0016 06/19/20 2245 06/19/20 2021 06/19/20 
0435 06/18/20 
0501  144 143 139  --  138 136  
K 4.1 4.7 5.0 6.1*  --  4.6 4.7 * 113* 113* 108  --  110* 109* CO2 17* 13* 12* 10*  --  20* 20* * 443* 531* 646*  --  239* 284* BUN 72* 75* 73* 83*  --  75* 69* CREA 4.14* 4.19* 4.27* 4.93*  --  3.74* 4.02* CA 6.8* 7.0* 6.6* 7.3*  --  8.4* 8.5 MG 1.9 2.2 2.2 2.6*   < >  --   --   
PHOS 4.5  --   --  9.5*  --  4.3 4.3 ALB 2.3*  --   --   --   --  2.4* 2.2* TBILI 0.7  --   --   --   --  0.7 0.6 ALT 94*  --   --   --   --  54 27 INR 1.5* 1.5*  --   --   --  1.1 1.2*  
 < > = values in this interval not displayed. Lab Results Component Value Date/Time Glucose (POC) 234 (H) 06/20/2020 07:02 AM  
 Glucose (POC) 236 (H) 06/20/2020 06:14 AM  
 Glucose (POC) 310 (H) 06/20/2020 05:05 AM  
 Glucose (POC) 313 (H) 06/20/2020 04:01 AM  
 Glucose (POC) 377 (H) 06/20/2020 03:01 AM  
 
Recent Labs  
  06/19/20 
2336 06/19/20 2120 PH 7.15* 6.87* PCO2 31* 47* PO2 45* 29* HCO3 11* 8*  
FIO2 60 80 Recent Labs  
  06/20/20 
0444 06/20/20 
0016 06/19/20 
0435 INR 1.5* 1.5* 1.1 All Micro Results Procedure Component Value Units Date/Time CULTURE, BLOOD [[id number]] Collected:  06/19/20 2150 Order Status:  Completed Specimen:  Blood Updated:  06/20/20 0559 Special Requests: NO SPECIAL REQUESTS Culture result: NO GROWTH AFTER 6 HOURS     
 CULTURE, BLOOD [[id number]] Collected:  06/19/20 2021 Order Status:  Completed Specimen:  Blood Updated:  06/20/20 0559 Special Requests: NO SPECIAL REQUESTS Culture result: NO GROWTH AFTER 7 HOURS     
 CULTURE, MRSA [[id number]] Order Status:  Sent Specimen:  Nasal   
 LEGIONELLA PNEUMOPHILA AG, URINE [[id number]] Collected:  06/05/20 1405 Order Status:  Completed Specimen:  Urine Updated:  06/08/20 1337 Source URINE     
  L pneumophila S1 Ag, urine Negative Comment: (NOTE) Presumptive negative for L. pneumophila serogroup 1 antigen in urine, 
suggesting no recent or current infection. Legionnaires' disease cannot be ruled out since other serogroups and species may also 
cause disease. Performed At: [address] [id number] Amena Winters MD OR:[id number] CULTURE, RESPIRATORY/SPUTUM/BRONCH Kristan Pellant [[id number]] Collected:  06/06/20 1022 Order Status:  Canceled Specimen:  Sputum MYCOPLASMA AB, IGM [[id number]] Collected:  06/05/20 1839 Order Status:  Completed Specimen:  Serum Updated:  06/06/20 0017 Mycoplasma Ab, IgM NONREACTIVE     
 RESPIRATORY PANEL,PCR,NASOPHARYNGEAL [[id number]] Collected:  06/05/20 1007 Order Status:  Completed Specimen:  Nasopharyngeal Updated:  06/05/20 1554 Adenovirus Not detected Coronavirus 229E Not detected Coronavirus HKU1 Not detected Coronavirus CVNL63 Not detected Coronavirus OC43 Not detected Metapneumovirus Not detected Rhinovirus and Enterovirus Not detected Influenza A Not detected Influenza A, subtype H1 Not detected Influenza A, subtype H3 Not detected INFLUENZA A H1N1 PCR Not detected Influenza B Not detected Parainfluenza 1 Not detected Parainfluenza 2 Not detected Parainfluenza 3 Not detected Parainfluenza virus 4 Not detected RSV by PCR Not detected B. parapertussis, PCR Not detected Bordetella pertussis - PCR Not detected Chlamydophila pneumoniae DNA, QL, PCR Not detected Mycoplasma pneumoniae DNA, QL, PCR Not detected I have reviewed notes of prior 24hr. Other pertinent lab: Total time spent with patient: 28 Care Plan discussed with: Patient, Nursing Staff and >50% of time spent in counseling and coordination of care Discussed:  Care Plan Prophylaxis:  Lovenox Disposition:  Home w/Family 
        
___________________________________________________ Attending Physician: Shobha Jimenez MD

## 2020-06-21 NOTE — PROGRESS NOTES
[facility], Critical Care, [facility] Sleep Medicine Progress Note Name: Shelby Hernandez MRN: [id number] : 1945 Hospital: [address] [facility] Date: 2020 IMPRESSION:  
· Acute Respiratory Failure with Hypoxia; intubated  · Acute blood loss anemia · Shock · Left hip fracture · COVID-19+ · Elevated troponin · LESLEY, ? CKD · HTN 
· Hyperglycemia RECOMMENDATIONS:  
· Continue vent support, requiring FiO2 100% · Continue pressors to goal MAP 65; maxed out on 3 pressors · Continue bicarb · Antibiotics resumed given severity of illness, agree with continuing for now · S/p 3 unit PRBC · Trend H&H 
· Ortho consult; too unstable for any imaging or intervention · Too unstable for CT hip right now · Head CT post fall negative · Off heparin drip · Approaching need for CRRT but family declined · Insulin drip · Stopped Anthony on  · Continue Dexamethasone for 10 days total. 
· Trend inflammatory markers · S/P course of Remdesivir; completed  · Continue vitamin C, zinc, lipitor · NPO 
· SCD Prognosis poor at this point with multiorgan failure and refractory shock and hypoxia. Continue goals of care discussions. Now DNR. Patient is critically ill and at high risk of decompensation. CCT 33 minutes Subjective:  
 
Ms. Paloma Bedoya is a 73yo female w/ history of HTN who presented to the ER on  w/ complaints of shortness of breath, weakness and chest pain. Sats 93% on RA. Has family members that are COVID+. Home med list includes 10mg prednisone that she has apparently been taking since April for \"pain. \" 
 
 Interval history Afebrile HR 70-80s; better Sats 95% on 1L NC 
WBC 16.1; better Hgb 8.9 Pro-BNP 2212; better ; worse CRP 5.8; better Feritin 443; worse D-dimer . 79; better Creat 3.74; better LFTs increased, , ALT 54 
200mL urine output documented 6/20: Eventful night. Patient got up in her room without assistance and fell, seemingly fracturing her left hip. Subsequently developed hypotension and AMS and a code blue was called. Patient did not lose a pulse but did require intubation by anesthesia. Labs drawn revealed a hemoglobin of 3.6. Started on pressors and given 3 unit PRBC. This morning remains intubated, sedated on norepi and vasopressin. 6/21: Another difficult night. BP remains low despite max doses of brittnee, norepi, and vaso. Family yesterday met with hospitalist as they do not want CRRT--changed code status to DNR but not ready for comfort measures yet. Patient remains intubated and sedated this morning. Past Medical History:  
Diagnosis Date  
 HTN (hypertension), benign No past surgical history on file. Prior to Admission medications Medication Sig Start Date End Date Taking? Authorizing Provider  
lisinopriL (PRINIVIL, ZESTRIL) 5 mg tablet Take 5 mg by mouth daily. Yes Provider, Historical  
predniSONE (DELTASONE) 5 mg tablet Take 10 mg by mouth daily. 6/2/20 7/2/20 Yes Provider, Historical  
 
No Known Allergies Social History Tobacco Use  Smoking status: Never Smoker  Smokeless tobacco: Never Used Substance Use Topics  Alcohol use: Never Frequency: Never Family History Problem Relation Age of Onset  Diabetes Neg Hx Current Facility-Administered Medications Medication Dose Route Frequency  vancomycin (VANCOCIN) 750 mg in 0.9% sodium chloride (MBP/ADV) 250 mL  750 mg IntraVENous ONCE  pantoprazole (PROTONIX) 40 mg in 0.9% sodium chloride 10 mL injection  40 mg IntraVENous Q12H  
 metroNIDAZOLE (FLAGYL) IVPB premix 500 mg  500 mg IntraVENous Q12H  
 insulin lispro (HUMALOG) injection   SubCUTAneous Q6H  
 NOREPINephrine (LEVOPHED) 32,000 mcg in dextrose 5% 250 mL (128 mcg/mL) infusion  0.5-200 mcg/min IntraVENous TITRATE  propofol (DIPRIVAN) 10 mg/mL infusion  0-50 mcg/kg/min IntraVENous TITRATE  fentaNYL (PF) 1,500 mcg/30 mL (50 mcg/mL) infusion  0-200 mcg/hr IntraVENous TITRATE  vasopressin (VASOSTRICT) 20 Units in 0.9% sodium chloride 100 mL infusion  0-0.1 Units/min IntraVENous CONTINUOUS  
 sodium bicarbonate (8.4%) 150 mEq in sterile water 1,000 mL infusion   IntraVENous CONTINUOUS  
 PHENYLephrine (NTAE-SYNEPHRINE) 100 mg in 0.9% sodium chloride 250 mL infusion   mcg/min IntraVENous TITRATE  cefepime (MAXIPIME) 1 g in 0.9% sodium chloride (MBP/ADV) 50 mL  1 g IntraVENous Q24H  
 vancomycin: Pharmacy dosing by random level   Other Rx Dosing/Monitoring  polyethylene glycol (MIRALAX) packet 17 g  17 g Oral BID  senna-docusate (PERICOLACE) 8.6-50 mg per tablet 1 Tab  1 Tab Oral DAILY  dexAMETHasone (DECADRON) tablet 10 mg  10 mg Oral DAILY  sodium chloride (NS) flush 5-40 mL  5-40 mL IntraVENous Q8H  
 ascorbic acid (vitamin C) (VITAMIN C) tablet 500 mg  500 mg Oral BID  zinc sulfate (ZINCATE) 220 (50) mg capsule 1 Cap  1 Cap Oral DAILY  atorvastatin (LIPITOR) tablet 40 mg  40 mg Oral DAILY  guaiFENesin ER (MUCINEX) tablet 1,200 mg  1,200 mg Oral BID Review of Systems: A comprehensive review of systems was negative except for that written in the HPI. Objective:  
Vital Signs:   
Visit Vitals BP (!) 85/32 Pulse (!) 116 Temp 98.3 °F (36.8 °C) Resp 29 Ht 4' 11\" (1.499 m) Wt 78 kg (171 lb 15.3 oz) SpO2 94% BMI 34.73 kg/m² O2 Device: Endotracheal tube, Ventilator O2 Flow Rate (L/min): 7 l/min Temp (24hrs), Av °F (37.2 °C), Min:98.3 °F (36.8 °C), Max:99.3 °F (37.4 °C) Intake/Output:  
Last shift:      No intake/output data recorded. Last 3 shifts:  1901 -  0700 In: 6585.3 [I.V.:5534] Out: 97 [Urine:27] Intake/Output Summary (Last 24 hours) at 2020 0750 Last data filed at 2020 2874 Gross per 24 hour Intake 2233.15 ml  
 Output 27 ml Net 2206.15 ml Physical Exam:  
General:  Intubated, sedated, no distress Head:  NCAT Eyes:  EOMI, conjunctive clear Nose: Nares normal, NC in place Throat: MMM Neck:  Supple, FROM, trachea midline Lungs:   Deferred Chest wall:  Normal shape, nontender Heart:  Deferred Abdomen:   Obese, soft, NT Extremities: No c/c/e Pulses: Deferred Skin: CDI, no rash Lymph nodes: Deferred Neurologic: Sedated Data review:  
 
Recent Results (from the past 24 hour(s)) GLUCOSE, POC Collection Time: 20  8:09 AM  
Result Value Ref Range Glucose (POC) 185 (H) 65 - 100 mg/dL Performed by Santos Bey Collection Time: 20  8:09 AM  
Result Value Ref Range Glucose 185 mg/dL Insulin order 9.0 units/hour Insulin adminstered 9.0 units/hour Multiplier 0.072 Low target 200 mg/dL High target 300 mg/dL D50 order 0.0 ml  
 D50 administered 0.00 ml Minutes until next BG 60 min Order initials DC Administered initials DC   
 GLSCOM Comments CULTURE, MRSA Collection Time: 20  9:05 AM  
Result Value Ref Range Special Requests: NO SPECIAL REQUESTS Culture result: MRSA NOT PRESENT AT 12 HOURS METABOLIC PANEL, BASIC Collection Time: 20  9:05 AM  
Result Value Ref Range Sodium 144 136 - 145 mmol/L Potassium 4.0 3.5 - 5.1 mmol/L Chloride 113 (H) 97 - 108 mmol/L  
 CO2 20 (L) 21 - 32 mmol/L Anion gap 11 5 - 15 mmol/L Glucose 130 (H) 65 - 100 mg/dL BUN 73 (H) 6 - 20 MG/DL Creatinine 4.15 (H) 0.55 - 1.02 MG/DL  
 BUN/Creatinine ratio 18 12 - 20 GFR est AA 13 (L) >60 ml/min/1.73m2 GFR est non-AA 10 (L) >60 ml/min/1.73m2 Calcium 6.7 (L) 8.5 - 10.1 MG/DL MAGNESIUM Collection Time: 20  9:05 AM  
Result Value Ref Range Magnesium 2.1 1.6 - 2.4 mg/dL HEMOGLOBIN Collection Time: 06/20/20  9:05 AM  
Result Value Ref Range HGB 11.7 11.5 - 16.0 g/dL GLUCOSE, POC Collection Time: 06/20/20  9:07 AM  
Result Value Ref Range Glucose (POC) 174 (H) 65 - 100 mg/dL Performed by Leeann Chen Collection Time: 06/20/20  9:08 AM  
Result Value Ref Range Glucose 174 mg/dL Insulin order 6.6 units/hour Insulin adminstered 6.6 units/hour Multiplier 0.058 Low target 200 mg/dL High target 300 mg/dL D50 order 0.0 ml  
 D50 administered 0.00 ml Minutes until next BG 60 min Order initials DC Administered initials DC   
 GLSCOM Comments LACTIC ACID Collection Time: 06/20/20  9:10 AM  
Result Value Ref Range Lactic acid 3.2 (HH) 0.4 - 2.0 MMOL/L  
GLUCOSE, POC Collection Time: 06/20/20 10:12 AM  
Result Value Ref Range Glucose (POC) 122 (H) 65 - 100 mg/dL Performed by Leeann Chen Collection Time: 06/20/20 10:13 AM  
Result Value Ref Range Glucose 122 mg/dL Insulin order 2.9 units/hour Insulin adminstered 2.9 units/hour Multiplier 0.046 Low target 200 mg/dL High target 300 mg/dL D50 order 0.0 ml  
 D50 administered 0.00 ml Minutes until next BG 60 min Order initials dc Administered initials dc GLSCOM Comments GLUCOSE, POC Collection Time: 06/20/20 11:13 AM  
Result Value Ref Range Glucose (POC) 121 (H) 65 - 100 mg/dL Performed by Leeann Chen Collection Time: 06/20/20 11:13 AM  
Result Value Ref Range Glucose 121 mg/dL Insulin order 2.2 units/hour Insulin adminstered 2.2 units/hour Multiplier 0.036 Low target 200 mg/dL High target 300 mg/dL D50 order 0.0 ml  
 D50 administered 0.00 ml Minutes until next BG 60 min Order initials dc Administered initials dc GLSCOM Comments reviewed with Mary MIRELES RN   
GLUCOSE, POC Collection Time: 06/20/20 12:06 PM  
Result Value Ref Range Glucose (POC) 93 65 - 100 mg/dL Performed by Leeann Chen  
 Collection Time: 06/20/20 12:07 PM  
Result Value Ref Range Glucose 93 mg/dL Insulin order 0.9 units/hour Insulin adminstered 0.9 units/hour Multiplier 0.026 Low target 200 mg/dL High target 300 mg/dL D50 order 0.0 ml  
 D50 administered 0.00 ml Minutes until next BG 60 min Order initials dc Administered initials dc GLSCOM Comments METABOLIC PANEL, BASIC Collection Time: 06/20/20  1:03 PM  
Result Value Ref Range Sodium 144 136 - 145 mmol/L Potassium 4.7 3.5 - 5.1 mmol/L Chloride 112 (H) 97 - 108 mmol/L  
 CO2 22 21 - 32 mmol/L Anion gap 10 5 - 15 mmol/L Glucose 96 65 - 100 mg/dL BUN 74 (H) 6 - 20 MG/DL Creatinine 4.17 (H) 0.55 - 1.02 MG/DL  
 BUN/Creatinine ratio 18 12 - 20 GFR est AA 13 (L) >60 ml/min/1.73m2 GFR est non-AA 10 (L) >60 ml/min/1.73m2 Calcium 6.7 (L) 8.5 - 10.1 MG/DL MAGNESIUM Collection Time: 06/20/20  1:03 PM  
Result Value Ref Range Magnesium 2.0 1.6 - 2.4 mg/dL GLUCOSE, POC Collection Time: 06/20/20  1:05 PM  
Result Value Ref Range Glucose (POC) 102 (H) 65 - 100 mg/dL Performed by Shiraz Le Collection Time: 06/20/20  1:08 PM  
Result Value Ref Range Glucose 102 mg/dL Insulin order 0.7 units/hour Insulin adminstered 0.7 units/hour Multiplier 0.016 Low target 200 mg/dL High target 300 mg/dL D50 order 0.0 ml  
 D50 administered 0.00 ml Minutes until next BG 60 min Order initials dc Administered initials dc GLSCOM Comments GLUCOSE, POC Collection Time: 06/20/20  2:04 PM  
Result Value Ref Range Glucose (POC) 89 65 - 100 mg/dL Performed by Shiraz Le Collection Time: 06/20/20  2:05 PM  
Result Value Ref Range Glucose 89 mg/dL Insulin order 0.2 units/hour Insulin adminstered 0.2 units/hour Multiplier 0.006 Low target 200 mg/dL High target 300 mg/dL  D50 order 0.0 ml  
 D50 administered 0.00 ml Minutes until next BG 60 min Order initials dc Administered initials dc GLSCOM Comments HEMOGLOBIN Collection Time: 06/20/20  3:03 PM  
Result Value Ref Range HGB 10.4 (L) 11.5 - 16.0 g/dL LACTIC ACID Collection Time: 06/20/20  3:03 PM  
Result Value Ref Range Lactic acid 3.7 (HH) 0.4 - 2.0 MMOL/L  
GLUCOSE, POC Collection Time: 06/20/20  5:10 PM  
Result Value Ref Range Glucose (POC) 61 (L) 65 - 100 mg/dL Performed by Denisse Mccoy GLUCOSE, POC Collection Time: 06/20/20  5:25 PM  
Result Value Ref Range Glucose (POC) 160 (H) 65 - 100 mg/dL Performed by Denisse Mccoy HEMOGLOBIN Collection Time: 06/20/20  8:21 PM  
Result Value Ref Range HGB 8.9 (L) 11.5 - 16.0 g/dL LACTIC ACID Collection Time: 06/20/20  9:42 PM  
Result Value Ref Range Lactic acid 8.4 (HH) 0.4 - 2.0 MMOL/L  
GLUCOSE, POC Collection Time: 06/21/20 12:39 AM  
Result Value Ref Range Glucose (POC) 159 (H) 65 - 100 mg/dL Performed by Zunilda Walters PROTHROMBIN TIME + INR Collection Time: 06/21/20  3:45 AM  
Result Value Ref Range INR 1.8 (H) 0.9 - 1.1 Prothrombin time 18.2 (H) 9.0 - 11.1 sec FIBRINOGEN Collection Time: 06/21/20  3:45 AM  
Result Value Ref Range Fibrinogen 123 (L) 200 - 475 mg/dL D DIMER Collection Time: 06/21/20  3:45 AM  
Result Value Ref Range D-dimer 6.12 (H) 0.00 - 0.65 mg/L FEU  
C REACTIVE PROTEIN, QT Collection Time: 06/21/20  3:45 AM  
Result Value Ref Range C-Reactive protein 4.68 (H) 0.00 - 0.60 mg/dL LD Collection Time: 06/21/20  3:45 AM  
Result Value Ref Range LD 1,211 (H) 81 - 246 U/L  
LACTIC ACID Collection Time: 06/21/20  3:45 AM  
Result Value Ref Range Lactic acid 14.3 (HH) 0.4 - 2.0 MMOL/L  
VANCOMYCIN, RANDOM Collection Time: 06/21/20  3:45 AM  
Result Value Ref Range Vancomycin, random 16.3 UG/ML  
CBC W/O DIFF  Collection Time: 06/21/20  3:45 AM  
 Result Value Ref Range WBC 40.5 (H) 3.6 - 11.0 K/uL  
 RBC 2.65 (L) 3.80 - 5.20 M/uL HGB 8.0 (L) 11.5 - 16.0 g/dL HCT 24.8 (L) 35.0 - 47.0 % MCV 93.6 80.0 - 99.0 FL  
 MCH 30.2 26.0 - 34.0 PG  
 MCHC 32.3 30.0 - 36.5 g/dL  
 RDW 16.1 (H) 11.5 - 14.5 % PLATELET 58 (L) 528 - 400 K/uL MPV 12.8 8.9 - 12.9 FL  
 NRBC 6.8 (H) 0  WBC ABSOLUTE NRBC 2.74 (H) 0.00 - 0.01 K/uL NT-PRO BNP Collection Time: 06/21/20  3:45 AM  
Result Value Ref Range NT pro-BNP 3,635 (H) <450 PG/ML  
RENAL FUNCTION PANEL Collection Time: 06/21/20  3:45 AM  
Result Value Ref Range Sodium 138 136 - 145 mmol/L Potassium 5.1 3.5 - 5.1 mmol/L Chloride 105 97 - 108 mmol/L  
 CO2 10 (LL) 21 - 32 mmol/L Anion gap 23 (H) 5 - 15 mmol/L Glucose 232 (H) 65 - 100 mg/dL BUN 71 (H) 6 - 20 MG/DL Creatinine 5.10 (H) 0.55 - 1.02 MG/DL  
 BUN/Creatinine ratio 14 12 - 20 GFR est AA 10 (L) >60 ml/min/1.73m2 GFR est non-AA 8 (L) >60 ml/min/1.73m2 Calcium 6.0 (LL) 8.5 - 10.1 MG/DL Phosphorus 8.4 (H) 2.6 - 4.7 MG/DL Albumin 1.5 (L) 3.5 - 5.0 g/dL SAMPLES BEING HELD Collection Time: 06/21/20  3:45 AM  
Result Value Ref Range SAMPLES BEING HELD 1LAV,1BLU,2SST'S   
 COMMENT Add-on orders for these samples will be processed based on acceptable specimen integrity and analyte stability, which may vary by analyte. GLUCOSE, POC Collection Time: 06/21/20  6:06 AM  
Result Value Ref Range Glucose (POC) 137 (H) 65 - 100 mg/dL Performed by Wilma Boyce Imaging: 
I have personally reviewed the patients radiographs and have reviewed the reports: 
CXR (6/8/2020): There are diffuse patchy bilateral infiltrates, increased in density as compared to the prior study. CXR (6/12/2020): There is patchy bilateral upper lobe airspace disease that has increased as compared to the prior study. CXR 6/16:  Slight improvement in bilateral infiltration Jessie Morgan, DO

## 2020-06-21 NOTE — PROGRESS NOTES
[facility] [address] [facility] [place] YOB: 1945 Assessment & Plan:  
LESLEY, progressive - Cr around  5 mg - Poor urine Hyperkalemia 
- high normal 
Acidosis 
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 
HL 
 
Rec: 
Serial labs No dialysis per family Prognosis guarded Subjective:  
CC: F/u LESLEY 
HPI: Renal function worse, Acidosis worse, BP worse Current Facility-Administered Medications Medication Dose Route Frequency  0.9% sodium chloride infusion 250 mL  250 mL IntraVENous PRN  
 [START ON 6/22/2020] Vancomycin RANDOM level @0400 on 6/22/2020   Other ONCE  pantoprazole (PROTONIX) 40 mg in 0.9% sodium chloride 10 mL injection  40 mg IntraVENous Q12H  
 metroNIDAZOLE (FLAGYL) IVPB premix 500 mg  500 mg IntraVENous Q12H  
 insulin lispro (HUMALOG) injection   SubCUTAneous Q6H  
 glucose chewable tablet 16 g  4 Tab Oral PRN  
 dextrose (D50W) injection syrg 12.5-25 g  12.5-25 g IntraVENous PRN  
 glucagon (GLUCAGEN) injection 1 mg  1 mg IntraMUSCular PRN  
 NOREPINephrine (LEVOPHED) 32,000 mcg in dextrose 5% 250 mL (128 mcg/mL) infusion  0.5-200 mcg/min IntraVENous TITRATE  oxyCODONE-acetaminophen (PERCOCET) 5-325 mg per tablet 1 Tab  1 Tab Oral Q6H PRN  propofol (DIPRIVAN) 10 mg/mL infusion  0-50 mcg/kg/min IntraVENous TITRATE  fentaNYL (PF) 1,500 mcg/30 mL (50 mcg/mL) infusion  0-200 mcg/hr IntraVENous TITRATE  vasopressin (VASOSTRICT) 20 Units in 0.9% sodium chloride 100 mL infusion  0-0.1 Units/min IntraVENous CONTINUOUS  
 sodium bicarbonate (8.4%) 150 mEq in sterile water 1,000 mL infusion   IntraVENous CONTINUOUS  
 PHENYLephrine (NATE-SYNEPHRINE) 100 mg in 0.9% sodium chloride 250 mL infusion   mcg/min IntraVENous TITRATE  cefepime (MAXIPIME) 1 g in 0.9% sodium chloride (MBP/ADV) 50 mL  1 g IntraVENous Q24H  vancomycin: Pharmacy dosing by random level   Other Rx Dosing/Monitoring  0.9% sodium chloride infusion 250 mL  250 mL IntraVENous PRN  polyethylene glycol (MIRALAX) packet 17 g  17 g Oral BID  senna-docusate (PERICOLACE) 8.6-50 mg per tablet 1 Tab  1 Tab Oral DAILY  dexAMETHasone (DECADRON) tablet 10 mg  10 mg Oral DAILY  phenyleph-min oil-petrolatum (PREPARATION H) 0.25-14-74.9 % ointment   PeriANAL QID PRN  
 sodium chloride (NS) flush 5-40 mL  5-40 mL IntraVENous Q8H  
 sodium chloride (NS) flush 5-40 mL  5-40 mL IntraVENous PRN  
 acetaminophen (TYLENOL) tablet 650 mg  650 mg Oral Q4H PRN  
 naloxone (NARCAN) injection 0.4 mg  0.4 mg IntraVENous PRN  
 ondansetron (ZOFRAN) injection 4 mg  4 mg IntraVENous Q4H PRN  
 morphine injection 2 mg  2 mg IntraVENous Q4H PRN  
 ascorbic acid (vitamin C) (VITAMIN C) tablet 500 mg  500 mg Oral BID  zinc sulfate (ZINCATE) 220 (50) mg capsule 1 Cap  1 Cap Oral DAILY  atorvastatin (LIPITOR) tablet 40 mg  40 mg Oral DAILY  guaiFENesin ER (MUCINEX) tablet 1,200 mg  1,200 mg Oral BID Objective:  
 
Vitals: 
Blood pressure (!) 61/38, pulse (!) 125, temperature 98.3 °F (36.8 °C), resp. rate (!) 32, height 4' 11\" (1.499 m), weight 78 kg (171 lb 15.3 oz), SpO2 93 %. Temp (24hrs), Av.1 °F (36.7 °C), Min:97.6 °F (36.4 °C), Max:99 °F (37.2 °C) Intake and Output: 
 0701 - 0 In: 5695.1 [I.V.:5315.1] Out: 130 [Urine:10] 
1901 -  0700 In: 6585.3 [I.V.:5534] Out: 97 [Urine:27] Physical Exam:              
IN person exam deferred due to COVID isolation status ECG/rhythm: 
 
Data Review CXR (2020): There are diffuse patchy bilateral infiltrates, increased in density as compared to the prior study No results for input(s): TNIPOC in the last 72 hours. No lab exists for component: ITNL Recent Labs  
  20 
2245 TROIQ 0.16* Recent Labs  
  20 
1014 20 
0345 20 2021 06/20/20 
1303 06/20/20 
3931 06/20/20 
0450  06/19/20 
2245 06/19/20 2021 06/19/20 
0435 NA  --  138  --   --  144 144 143   < > 143 139  --  138 K  --  5.1  --   --  4.7 4.0 4.1   < > 5.0 6.1*  --  4.6 CL  --  105  --   --  112* 113* 114*   < > 113* 108  --  110* CO2  --  10*  --   --  22 20* 17*   < > 12* 10*  --  20* BUN  --  71*  --   --  74* 73* 72*   < > 73* 83*  --  75* CREA  --  5.10*  --   --  4.17* 4.15* 4.14*   < > 4.27* 4.93*  --  3.74* GLU  --  232*  --   --  96 130* 275*   < > 531* 646*  --  239* PHOS  --  8.4*  --   --   --   --  4.5  --   --  9.5*  --  4.3 MG  --   --   --   --  2.0 2.1 1.9   < > 2.2 2.6*   < >  --   
CA  --  6.0*  --   --  6.7* 6.7* 6.8*   < > 6.6* 7.3*  --  8.4* ALB  --  1.5*  --   --   --   --  2.3*  --   --   --   --  2.4* WBC  --  40.5*  --   --   --   --  24.1*  --  21.1*  --   --  16.1* HGB 6.7* 8.0* 8.9*   < >  --  11.7 11.6  --  3.6*  --   --  8.9* HCT 21.3* 24.8*  --   --   --   --  34.4*  --  12.5*  --   --  27.8* PLT  --  58*  --   --   --   --  116*  --  175  --   --  292  
 < > = values in this interval not displayed. Recent Labs  
  06/21/20 
0345 06/20/20 
0444 06/20/20 
0016 06/19/20 
2133 06/19/20 
1220 INR 1.8* 1.5* 1.5*  --   --   
PTP 18.2* 15.1* 15.4*  --   --   
APTT  --   --  42.8* 49.9* 93.4* Needs: urine analysis, urine sodium, protein and creatinine Lab Results Component Value Date/Time Sodium,urine random 80 06/07/2020 09:03 PM  
 Creatinine, urine 59.00 06/07/2020 09:03 PM  
 Creatinine, urine 62.10 06/07/2020 09:03 PM  
 
 
 
 
: Taryn Stevens MD 
6/21/2020 [facility]: 
[email or web address] [place] office: 
[address], [facility] [address] Phone: [phone] Fax :     [phone] [place] office: 
[address] [facility], [address] [facility] Phone - [phone] Fax - [phone]

## 2020-06-21 NOTE — PROGRESS NOTES
[email or web address] This nurse contacted Dr. Claudia Smith to request an Epi drip to help with maintaining MAP greater than 65. Dr. Claudia Smith called back and stated, \"She's already on 3 pressors, so I do not think that is a good idea, but contact the intensivist.\"  No new orders at this time. Will continue to monitor. [email or web address] This nurse contacted the on-call Pulmonary Service and left a message for Dr. Audra Simental requesting an Epi Drip. No new orders at this time. Will continue to monitor. [email or web address] Dr. Audra Simental called and advised to increase Levophed but did not want to start Epi Drip at this time. No new orders at this time. Will continue to monitor. [email or web address] This nurse requested KUB for abdominal distention. Dr. Claudia Smith advised to get KUB. KUB ordered. Will continue to monitor. [email or web address] This nurse informed Dr. Claudia Smith that the KUB was completed and this patient's MAP continues to range from 27-64. Dr. Claudia Smith was present. No new orders at this time. Will continue to monitor. [email or web address] This nurse informed Dr. Claudia Smith that this patient's BP is 38/15 with MAP of 21, cool extremities, and irregular Pulse Oximeter readings. No new orders at this time. Will continue to monitor. [email or web address] This nurse informed Dr. Claudia Smith of lactic 14.3, CO2 10, Ca 6.0, CRP 4.68, WBC 40.5. No new orders at this time. [email or web address] This nurse gave a full report and turned over care to You Acuna RN.

## 2020-06-21 NOTE — PROGRESS NOTES
Spiritual Care Assessment/Progress Note [address] 
 
 
NAME: Ricco Morgan      MRN: [id number] AGE: 67 y.o. SEX: female Zoroastrianism Affiliation: No preference Language: Armenian 6/21/2020     Total Time (in minutes): 6 Spiritual Assessment begun in [facility] 3 INTENSIVE CARE through conversation with: 
  
    []Patient        [] Family    [] Friend(s) Reason for Consult: Initial/Spiritual assessment, critical care Spiritual beliefs: (Please include comment if needed) 
   [] Identifies with a mirella tradition:     
   [] Supported by a mirella community:        
   [] Claims no spiritual orientation:       
   [] Seeking spiritual identity:            
   [] Adheres to an individual form of spirituality:       
   [x] Not able to assess:                   
 
    
Identified resources for coping:  
   [] Prayer                           
   [] Music                  [] Guided Imagery 
   [] Family/friends                 [] Pet visits [] Devotional reading                         [x] Unknown 
   [] Other:                                  
 
 
Interventions offered during this visit: (See comments for more details) Patient Interventions: Initial visit(Attempted) Plan of Care: 
 
 [] Support spiritual and/or cultural needs  
 [] Support AMD and/or advance care planning process    
 [] Support grieving process 
 [] Coordinate Rites and/or Rituals  
 [] Coordination with community clergy [] No spiritual needs identified at this time 
 [] Detailed Plan of Care below (See Comments)  [] Make referral to Music Therapy 
[] Make referral to Pet Therapy    
[] Make referral to Addiction services 
[] Make referral to [facility] 
[] Make referral to Spiritual Care Partner 
[] No future visits requested       
[x] Follow up visits as needed Comments: Attempted Initial spiritual assessment in ICU.   Miss Lisa Serrano was transferred from the 4th floor to ICU. Miss Paloma Bedoya is intubated unable to communicate at this time and on Contact precautions. Consulted with RN who had not spoken with family today. Chaplains will continue to follow as able and/or needed. Visited by: Angelia Rojas. MS., [address] ([id number])

## 2020-06-21 NOTE — PROGRESS NOTES
Problem: Non-Violent Restraints Goal: *Removal from restraints as soon as assessed to be safe Outcome: Progressing Towards Goal 
Goal: *No harm/injury to patient while restraints in use Outcome: Progressing Towards Goal 
Goal: *Patient's dignity will be maintained Outcome: Progressing Towards Goal 
Goal: *Patient Specific Goal (EDIT GOAL, INSERT TEXT) Outcome: Progressing Towards Goal 
Goal: Non-violent Restaints:Standard Interventions Outcome: Progressing Towards Goal 
Goal: Non-violent Restraints:Patient Interventions Outcome: Progressing Towards Goal 
Goal: Patient/Family Education Outcome: Progressing Towards Goal

## 2020-06-21 NOTE — PROGRESS NOTES
1900: Bedside and Verbal shift change report given to Mery ALVES RN (oncoming nurse) by Tracy Orellana RN (offgoing nurse). Report included the following information SBAR, Kardex, Intake/Output, Recent Results, Cardiac Rhythm ST and Alarm Parameters . Primary Nurse [id number] [address], RN and Tracy Orellana RN performed a dual skin assessment on this patient Impairment noted- see wound doc flow sheet. Sixto score is 8. 
2000: Shift  Assessment completed. Pt repositioned, suctioned, and mouth care completed. Mental Status: Eyes open spontaneously and to voice. Unable to follow commands. Respiratory: Lungs diminished on vent. Vent settings , RR 36, PEEP 10, FiO2 100% Cardiac: ST on the monitor. GI/: Killian cath in place, very hypoactive bowel sounds. IV Lines/Drips: RIJ TLC, JAROD 20G. Clinton, Levo, Fentanyl, Vasopressin, Bicarb 
2200: Pt repositioned, suctioned, and mouth care completed. 2302: Notified Dr. Raquel Zimmerman of critical lactic acid and platelet count. 2332: Notified Dr. Raquel Zimmerman that pt's blood glucose level was 372, orders received for 12 units of insulin. 2336: PRN tylenol given for fever. 0000: Reassessment completed. No changes from previous assessment. Pt repositioned, suctioned, and mouth care completed. 0200: Pt repositioned, suctioned, and mouth care completed. 8249: Fentanyl increased, pt restless. 0400: Reassessment completed. No changes from previous assessment. Pt repositioned, suctioned, and mouth care completed. Full CHG bath with killian cath care completed. 12: Notified Dr. Raquel Zimmerman of critical lab values for platelets, lactic acid, calcium, and ddimer. 0600: Pt repositioned, suctioned, and mouth care completed. 0700: Bedside and Verbal shift change report given to Brett Garrison RN (oncoming nurse) by Sintia Zhao RN (offgoing nurse). Report included the following information SBAR, Kardex, Intake/Output, Recent Results, Cardiac Rhythm ST and Alarm Parameters .

## 2020-06-21 NOTE — PROGRESS NOTES
Vancomycin random level, drawn 28 hours post-dose, was 16.3 mcg/ml. Will order 750 mg IV Vancomycin to be given now.

## 2020-06-21 NOTE — PROGRESS NOTES
Suresh Hutchinson [facility] 
[address] [facility], [place], [address] [place] [address] 
[phone] Medical Progress Note NAME: Mike Berman :  1945 MRM:  [id number] Date/Time of service: 2020  8:57 AM 
 
  
Subjective: Chief Complaint:  Patient was personally seen and examined by me during this time period. Chart reviewed. Remains intubated, sedated, on multiple pressors Objective:  
 
 
Vitals:  
 
 
Last 24hrs VS reviewed since prior progress note. Most recent are: 
 
Visit Vitals BP (!) 68/41 Pulse (!) 113 Temp 98.3 °F (36.8 °C) Resp (!) 36 Ht 4' 11\" (1.499 m) Wt 78 kg (171 lb 15.3 oz) SpO2 (!) 73% BMI 34.73 kg/m² SpO2 Readings from Last 6 Encounters:  
20 (!) 73% O2 Flow Rate (L/min): 7 l/min Intake/Output Summary (Last 24 hours) at 2020 6550 Last data filed at 2020 9694 Gross per 24 hour Intake 2167.88 ml Output 27 ml Net 2140.88 ml Exam:  
 
Physical Exam: 
 
Gen:  Disheveled, ill-appearing, intubated HEENT:  Pink conjunctivae, PERRL, moist mucous membranes Neck:  Supple, without masses Resp:  No accessory muscle use, CTAB anteriorly Card:  No murmurs, normal S1, S2 without thrills, edema Abd:  Soft, non-tender, non-distended, normoactive bowel sounds are present Musc:  No cyanosis or clubbing Skin:  No rashes, bruising of L hip Neuro:  sedated Psych:  sedated Medications Reviewed: (see below) Lab Data Reviewed: (see below) 
 
______________________________________________________________________ Medications:  
 
Current Facility-Administered Medications Medication Dose Route Frequency  pantoprazole (PROTONIX) 40 mg in 0.9% sodium chloride 10 mL injection  40 mg IntraVENous Q12H  
 metroNIDAZOLE (FLAGYL) IVPB premix 500 mg  500 mg IntraVENous Q12H  
 insulin lispro (HUMALOG) injection   SubCUTAneous Q6H  
 glucose chewable tablet 16 g  4 Tab Oral PRN  
  dextrose (D50W) injection syrg 12.5-25 g  12.5-25 g IntraVENous PRN  
 glucagon (GLUCAGEN) injection 1 mg  1 mg IntraMUSCular PRN  
 NOREPINephrine (LEVOPHED) 32,000 mcg in dextrose 5% 250 mL (128 mcg/mL) infusion  0.5-200 mcg/min IntraVENous TITRATE  oxyCODONE-acetaminophen (PERCOCET) 5-325 mg per tablet 1 Tab  1 Tab Oral Q6H PRN  propofol (DIPRIVAN) 10 mg/mL infusion  0-50 mcg/kg/min IntraVENous TITRATE  fentaNYL (PF) 1,500 mcg/30 mL (50 mcg/mL) infusion  0-200 mcg/hr IntraVENous TITRATE  vasopressin (VASOSTRICT) 20 Units in 0.9% sodium chloride 100 mL infusion  0-0.1 Units/min IntraVENous CONTINUOUS  
 sodium bicarbonate (8.4%) 150 mEq in sterile water 1,000 mL infusion   IntraVENous CONTINUOUS  
 PHENYLephrine (NATE-SYNEPHRINE) 100 mg in 0.9% sodium chloride 250 mL infusion   mcg/min IntraVENous TITRATE  cefepime (MAXIPIME) 1 g in 0.9% sodium chloride (MBP/ADV) 50 mL  1 g IntraVENous Q24H  
 vancomycin: Pharmacy dosing by random level   Other Rx Dosing/Monitoring  0.9% sodium chloride infusion 250 mL  250 mL IntraVENous PRN  polyethylene glycol (MIRALAX) packet 17 g  17 g Oral BID  senna-docusate (PERICOLACE) 8.6-50 mg per tablet 1 Tab  1 Tab Oral DAILY  dexAMETHasone (DECADRON) tablet 10 mg  10 mg Oral DAILY  phenyleph-min oil-petrolatum (PREPARATION H) 0.25-14-74.9 % ointment   PeriANAL QID PRN  
 sodium chloride (NS) flush 5-40 mL  5-40 mL IntraVENous Q8H  
 sodium chloride (NS) flush 5-40 mL  5-40 mL IntraVENous PRN  
 acetaminophen (TYLENOL) tablet 650 mg  650 mg Oral Q4H PRN  
 naloxone (NARCAN) injection 0.4 mg  0.4 mg IntraVENous PRN  
 ondansetron (ZOFRAN) injection 4 mg  4 mg IntraVENous Q4H PRN  
 morphine injection 2 mg  2 mg IntraVENous Q4H PRN  
 ascorbic acid (vitamin C) (VITAMIN C) tablet 500 mg  500 mg Oral BID  zinc sulfate (ZINCATE) 220 (50) mg capsule 1 Cap  1 Cap Oral DAILY  atorvastatin (LIPITOR) tablet 40 mg  40 mg Oral DAILY  guaiFENesin ER (MUCINEX) tablet 1,200 mg  1,200 mg Oral BID Lab Review:  
 
Recent Labs  
  06/21/20 
0345 06/20/20 2021 06/20/20 
1503  06/20/20 
0444 06/19/20 
2245 WBC 40.5*  --   --   --  24.1* 21.1* HGB 8.0* 8.9* 10.4*   < > 11.6 3.6* HCT 24.8*  --   --   --  34.4* 12.5*  
PLT 58*  --   --   --  116* 175  
 < > = values in this interval not displayed. Recent Labs  
  06/21/20 
0345 06/20/20 
1303 06/20/20 2212 06/20/20 
0444 06/20/20 
0016  06/19/20 2021 06/19/20 
6711  144 144 143 144   < > 139 138  
K 5.1 4.7 4.0 4.1 4.7   < > 6.1* 4.6  112* 113* 114* 113*   < > 108 110* CO2 10* 22 20* 17* 13*   < > 10* 20* * 96 130* 275* 443*   < > 646* 239* BUN 71* 74* 73* 72* 75*   < > 83* 75* CREA 5.10* 4.17* 4.15* 4.14* 4.19*   < > 4.93* 3.74* CA 6.0* 6.7* 6.7* 6.8* 7.0*   < > 7.3* 8.4* MG  --  2.0 2.1 1.9 2.2   < > 2.6*  --   
PHOS 8.4*  --   --  4.5  --   --  9.5* 4.3 ALB 1.5*  --   --  2.3*  --   --   --  2.4* TBILI  --   --   --  0.7  --   --   --  0.7 ALT  --   --   --  94*  --   --   --  54 INR 1.8*  --   --  1.5* 1.5*  --   --  1.1  
 < > = values in this interval not displayed. Lab Results Component Value Date/Time Glucose (POC) 137 (H) 06/21/2020 06:06 AM  
 Glucose (POC) 159 (H) 06/21/2020 12:39 AM  
 Glucose (POC) 160 (H) 06/20/2020 05:25 PM  
 Glucose (POC) 61 (L) 06/20/2020 05:10 PM  
 Glucose (POC) 89 06/20/2020 02:04 PM  
 
 
  
Assessment / Plan:  
 
68 yo hx of HTN, DM, presented w/ hypoxia, COVID PNA, s/p course of remdesivir. Hospital course complicated by fall, Code Blue on 06/19, intubated. Also complicated by new L femoral neck fx, renal failure. Poor prognosis 1) Acute resp failure/hypoxia: no improvement. Due to COVID-19. Intubated on 06/19 after a fall, respiratory arrest.  Cont Vent support. Pulm following 2) Septic shock/PNA from COVID-19: worsening.   Due to COVID-19 and blood loss anemia. Poor prognosis. High chance of death. Cont triple pressors, IV Vanc, cefepime, flagyl, dexamethasone, Vit C, Zinc 
 
3) LESLEY: worsening. Due to COVID, septic shock. Family declined dialysis. Renal following 4) Acute met acidosis/lactic acidosis: due to septic shock. Cont bicarb gtt 5) Acute blood loss anemia: due to L hip fracture from all. S/p 3u RBCs. Cont to monitor Hgb, transfuse prn 
 
6) L hip fracture: due to fall. Too unstable for surgery. Ortho following 7) Elevated liver enzymes: due to COVID, septic shock. Will monitor 8) New type 2 DM: A1C 7.9%. Cont SSI prn. Was on insulin gtt Code: DNR Total time spent with patient: 39 Minutes (I personally spent time on critical care) **I personally saw and examined the patient during this time period** Care Plan discussed with: nursing, intensivist 
 
Discussed:  Care Plan Prophylaxis:  SCD's (due to acute bleeding from hip fx) Disposition:  hospice 
        
___________________________________________________ Attending Physician: En Nichols MD

## 2020-06-21 NOTE — PROGRESS NOTES
0700 - Bedside shift change report given to Juan Daniel Manjarrez (oncoming nurse) by Byron Castro (offgoing nurse). Report included the following information SBAR, Kardex, MAR, Recent Results and Cardiac Rhythm ST. Primary Nurse Rodolfo Vogel, RN and Byron Castro, RN performed a dual skin assessment on this patient Impairment noted- see wound doc flow sheet Sixto score is 10 
0750 - Shift assessment completed Neuro - Patient is unresponsive, pupils dilated, size 6mm and not reactive, prop turned off. Patient has involuntary movement of bilateral shoulders. Cards - ST on the monitor. Resp - lungs coarse, ETT 23 @ lip, FiO2 100%, thin clear secretions from ETT tube. Unable to maintain O2 sat on monitor. GI/ - bowel sounds hypoactive, patient abdomen very hard and distended, OGT 65 @ lip with intermittent suction, killian in place with minimal UOP. IV drips - Prop now off, fent @ 50, levo @ 200, Clinton @ 300, vaso @ 0.04, bicarb @ 100 Dr. Jonathan Sousa notified of patient's pupils, NNO.  
1000 - ABG drawn, Dr. Jonathan Sousa notified and RR increased to 35 on vent setting. 1015 - Labs drawn and sent, mouth care performed and patient turned to the right. Patient slightly responding to pain. 1045 - Patient Hgb 6.7, Dr. Leonard West notified and order received for 1u PRBC. 1115 - PRBC started. 36 - Dr. Leonard West notified of patient's lactic, NNO.  
1210 - Confirmed with pharmacy, okay to titrate vaso @ 0.005units/min q15 to reach goal of MAP >65.  
1345 - PRBC complete. 1400 - Mouth care performed, patient pulled up in bed and turned to the right. 1500 - Patient now opening eyes spontaneously, no command following, and pupils still not reactive to light. 1600 - Reassessment completed, patient is opening eyes spontaneously, responding to pain, still not following commands, pupils are now a 5mm and very sluggish but reacting. No other changes to previous assessment. Labs drawn and sent. BP still low, and unable to maintain O2 sat on monitor. 36 - Dr. Andrae Garcia notified of patient's plt 46. NNO.   
1800 - Patient turned to the left, mouth care performed, and foam dressings on right arm changed. 1900 - Bedside shift change report given to Mery (oncoming nurse) by Danilo Benavides (offgoing nurse). Report included the following information SBAR, Kardex, MAR, Recent Results and Cardiac Rhythm ST.

## 2020-06-21 NOTE — PROGRESS NOTES
20: 00 Had extensive discussion with patient's daughter and her  at the hospital and his . Explained patient's critical condition, on multiple pressors and ventilation. Despite all these patients remain hypotensive and hypoxic. After extensive explanation, family decided to make patient DNR( signed DDNR in chart) and will see further to stop treatment and withdraw care Patient 40 minutes of extra time.

## 2020-06-22 NOTE — PROGRESS NOTES
Spiritual Care Assessment/Progress Note [address] 
 
 
NAME: Fausto Elmore      MRN: [id number] AGE: 67 y.o. SEX: female Advent Affiliation: No preference Language: Vietnamese 6/22/2020     Total Time (in minutes): 35  
 
Spiritual Assessment begun in [facility] INTENSIVE CARE through conversation with: 
  
    []Patient        [x] Family    [] Friend(s) Reason for Consult: Palliative Care, Family Care Spiritual beliefs PER DAUGHTER: (Please include comment if needed) [x] Identifies with a mirella tradition: Jew    
   [] Supported by a mirella community:        
   [] Claims no spiritual orientation:       
   [] Seeking spiritual identity:            
   [] Adheres to an individual form of spirituality:       
   [] Not able to assess:                   
 
    
Identified resources for coping:  
   [] Prayer                           
   [] Music                  [] Guided Imagery 
   [] Family/friends                 [] Pet visits [] Devotional reading                         [x] Unknown 
   [] Other:                                 
 
 
Interventions offered during this visit: (See comments for more details) Patient Interventions: Initial visit(Attempted) Family/Friend(s): Affirmation of mirella, Iconic (affirming the presence of God/Higher Power) Plan of Care: 
 
 [] Support spiritual and/or cultural needs  
 [] Support AMD and/or advance care planning process    
 [] Support grieving process 
 [] Coordinate Rites and/or Rituals  
 [] Coordination with community clergy [] No spiritual needs identified at this time 
 [] Detailed Plan of Care below (See Comments)  [] Make referral to Music Therapy 
[] Make referral to Pet Therapy    
[] Make referral to Addiction services 
[] Make referral to [facility] 
[] Make referral to Spiritual Care Partner 
[] No future visits requested       
[x] Follow up visits as needed Comments: Joined with Palliative Care team Dr. Quincy Jose,  Mary with [facility] with a Phone call to Miss Tony Kowalski' daughter Alberto Romero. Provided presence as Doctor spoke with Alberto Romero who appeared to have a pretty good understanding of her mother's condition. She indicated her mother was pretty active though had some health challenges. Her mother enjoyed dressing up and looking pretty. Alberto Romero also shared her mother had said that when ever God says it is time she will go to [facility]. Chelsie affirmed her [employer]. She did not have any spiritual needs at this time. Alberto Romero indicated she may try and come to the hospital this afternoon to see her mother. Chaplains are available as able and/or needed. Visited by: Silverio Crawford MS., [address] ([id number])

## 2020-06-22 NOTE — PROGRESS NOTES
Patient remains intubated. Patient unable to have CT done. Xray of left hip with what appears to be a left femoral neck fracture and we will need a CT of the left hip prior to any surgical intervention. We will continue to follow along. Please page with any concerns. Gita Robles PA-C Orthopaedic Surgery  [facility]

## 2020-06-22 NOTE — WOUND CARE
Wound care consult for low wei- skin abrasion from recent fall, in covid isolation Skin care assessment reviewed with staff nurse. Skin care preventative measures in place. On ICU surface- off loading boots in place. Will follow [address]

## 2020-06-22 NOTE — CONSULTS
Palliative Medicine Consult Ernesto: [phone] ([phone]) Patient Name: Toñito Feliciano YOB: 1945 Date of Initial Consult: 6/22/20 Reason for Consult: End stage disease Requesting Provider: Dr. Clive Laureano 
Primary Care Physician: Angus, MD Latha 
 
 SUMMARY:  
Toñito Feliciano is a 67 y.o.  woman with a history of hypertension who was admitted on 6/5/2020 with a diagnosis of COVID-19 viral pneumonia after presenting with generalized weakness and dyspnea on exertion. She completed a course of Remdesevir and was on Dexamethasone with improvement overall improvement in her condition but persistent bilateral opacities on imaging. On the 19th she suffered a fall out of her hospital bed which resulted in a femoral neck fracture. Later that evening she developed respiratory arrest, was intubated and transferred to the ICU. She is now critically ill and not expected to survive. Palliative medicine has been consulted for end of life care. Psychosocial Hx- Lives in [place] with daughter Iwona Suarez, son-in-law and two grandchildren. She has several sons in [place]. She is Persian speaking. PALLIATIVE DIAGNOSES:  
1. Goals of care discussion 2. Oliguric acute renal failure 3. Acute respiratory failure 4. Shock 5. COVID-19 viral pneumonia PLAN:  
1. Assessed pt in ICU and discussed with bedside RN Jose Luis Murray and Dr. Caitie Mckeon. 2. Pt is sedated on Fentanyl at 100mcg/hr, breathing in the mid 30s in an effort to compensate severe metabolic acidosis. 3. Hospitalist team has had conversations with patient's daughter via the [employer] and decision was made for DNR status and for no dialysis initiation. A plan of extubation/comfort was also offered but declined. 4. Family meeting today held via phone with myself, [employer], [facility], [address]. Communication with daughter Iwona Suarez facilitated by our [facility] [employer]. 1. Chelsie expressed a good understand of the critical nature of her mother's condition and that she is not expected to survive. 2. She confirmed the decision for no CPR/shock at time of death. 3. She explained that the decision not to start dialysis was straightforward as her mother had been faced with this several years ago by her personal nephrologist and had chosen herself to forgo dialysis in favor of what she personally deemed to be good quality of life, understanding that she may have less time. 4. As Joshua Cabrales recognizes that her mother is expected to die during this admission, I recommended cessation of vasopressor support to allow a peaceful passing. Explained that it is often difficult to fully address one's comfort and ensure no suffering when ICU interventions are continued. Particularly as the decision is to forgo dialysis, this will prove to be quite a challenge. 11. Joshua Cabrales was very clear that the family's values are not aligned with stopping any interventions at this time. We did however make a plan to not escalate her care beyond what is currently being done- this means no further diagnostics (including lab work) or new medications added for new challenges. 10. Chelsie agreed that I may add any additional medications necessary to ensure her mother was comfortable for the remainder of her life. 7. I offered end of life visitation for one family member and Chelsie plans to come this afternoon. 5. Spiritual needs- none identified, pt identifies as Zoroastrian. 6. Symptoms- continue Fentanyl at 100 mcg/hr, can titrate as necessary. Add Valium 1 mg IV q15 min prn for agitation or anxiety. 7. Thank you for the opportunity to participate in the care of Pierre Wang 8. Communicated plan of care with: Palliative IDT, [facility] [address] Team including Dr. Sara Morales, Dr. Nargis Peacock, bedside RN. GOALS OF CARE / TREATMENT PREFERENCES:  
 
GOALS OF CARE: 
Patient/Health Care Proxy Stated Goals: Prolong life TREATMENT PREFERENCES:  
Code Status: DNR Advance Care Planning: 
[x] The [facility] Interdisciplinary Team has updated the ACP Navigator with [place] and Patient Capacity Primary Decision Maker: Angela Olivares - Daughter - [phone] Advance Care Planning 6/6/2020 Confirm Advance Directive None Medical Interventions: Limited additional interventions(no CPR/shock, further diagnostics) Other: As far as possible, the palliative care team has discussed with patient / health care proxy about goals of care / treatment preferences for patient. HISTORY:  
 
History obtained from:  Chart review, bedside RN 
 
CHIEF COMPLAINT: n/a 
 
HPI/SUBJECTIVE: The patient is:  
[] Verbal and participatory [x] Non-participatory due to: Intubated/sedated Clinical Pain Assessment (nonverbal scale for severity on nonverbal patients):  
Clinical Pain Assessment Severity: 3 Location: pt intubated/sedated unable to report Character: pt intubated/sedated unable to report Duration: pt intubated/sedated unable to report Effect: pt intubated/sedated unable to report Factors: pt intubated/sedated unable to report Frequency: pt intubated/sedated unable to report Activity (Movement): Lying quietly, normal position Duration: for how long has pt been experiencing pain (e.g., 2 days, 1 month, years) Frequency: how often pain is an issue (e.g., several times per day, once every few days, constant) FUNCTIONAL ASSESSMENT:  
 
Palliative Performance Scale (PPS): PPS: 10 PSYCHOSOCIAL/SPIRITUAL SCREENING:  
 
Palliative IDT has assessed this patient for cultural preferences / practices and a referral made as appropriate to needs (Cultural Services, Patient Advocacy, Ethics, etc.) Any spiritual / Baptism concerns: 
[] Yes /  [x] No 
 
Caregiver Burnout: 
[] Yes /  [] No /  [x] No Caregiver Present Anticipatory grief assessment: [x] Normal  / [] Maladaptive ESAS Anxiety: ESAS Depression:    
 
 
 REVIEW OF SYSTEMS:  
 
Positive and pertinent negative findings in ROS are noted above in HPI. The following systems were [x] reviewed / [] unable to be reviewed as noted in HPI Other findings are noted below. Systems: constitutional, ears/nose/mouth/throat, respiratory, gastrointestinal, genitourinary, musculoskeletal, integumentary, neurologic, psychiatric, endocrine. Positive findings noted below. Modified ESAS Completed by: provider Pain: 3 Dyspnea: 6 Stool Occurrence(s): 0 PHYSICAL EXAM:  
 
From RN flowsheet: 
Wt Readings from Last 3 Encounters:  
06/06/20 78 kg (171 lb 15.3 oz) Blood pressure 135/74, pulse (!) 126, temperature 99.4 °F (37.4 °C), resp. rate (!) 35, height 4' 11\" (1.499 m), weight 78 kg (171 lb 15.3 oz), SpO2 100 %. Pain Scale 1: Adult Nonverbal Pain Scale Pain Intensity 1: 0 Pain Onset 1: acute Pain Location 1: Head 
Pain Orientation 1: Right Pain Description 1: Aching Pain Intervention(s) 1: Medication (see MAR) Last bowel movement, if known:  
 
Elderly woman intubated in the ICU Ventilator RR set at 30 Unresponsive, on fentanyl 100 mcg/hr HISTORY:  
 
Principal Problem: 
  Pneumonia due to COVID-19 virus (6/5/2020) Active Problems: 
  LESLEY (acute kidney injury) ([facility] [address] (6/5/2020) HTN (hypertension), benign (6/5/2020) Acute blood loss anemia (6/20/2020) DM (diabetes mellitus) ([address] (6/20/2020) Metabolic acidosis (6/21/0683) Lactic acidosis (6/20/2020) Hyperkalemia (6/20/2020) Hypovolemic shock ([address] (6/20/2020) Past Medical History:  
Diagnosis Date  
 HTN (hypertension), benign No past surgical history on file. Family History Problem Relation Age of Onset  Diabetes Neg Hx History reviewed, no pertinent family history. Social History Tobacco Use  Smoking status: Never Smoker  Smokeless tobacco: Never Used Substance Use Topics  Alcohol use: Never Frequency: Never No Known Allergies Current Facility-Administered Medications Medication Dose Route Frequency  [START ON 6/23/2020] Vancomycin random level 6/23 with AM labs    Other ONCE  
 vasopressin (VASOSTRICT) 20 Units in 0.9% sodium chloride 100 mL infusion  0.04 Units/min IntraVENous CONTINUOUS  
 midazolam (VERSED) injection 1 mg  1 mg IntraVENous Q15MIN PRN  
 0.9% sodium chloride infusion 250 mL  250 mL IntraVENous PRN  pantoprazole (PROTONIX) 40 mg in 0.9% sodium chloride 10 mL injection  40 mg IntraVENous Q12H  
 metroNIDAZOLE (FLAGYL) IVPB premix 500 mg  500 mg IntraVENous Q12H  
 insulin lispro (HUMALOG) injection   SubCUTAneous Q6H  
 glucose chewable tablet 16 g  4 Tab Oral PRN  
 dextrose (D50W) injection syrg 12.5-25 g  12.5-25 g IntraVENous PRN  
 glucagon (GLUCAGEN) injection 1 mg  1 mg IntraMUSCular PRN  
 NOREPINephrine (LEVOPHED) 32,000 mcg in dextrose 5% 250 mL (128 mcg/mL) infusion  0.5-200 mcg/min IntraVENous TITRATE  oxyCODONE-acetaminophen (PERCOCET) 5-325 mg per tablet 1 Tab  1 Tab Oral Q6H PRN  propofol (DIPRIVAN) 10 mg/mL infusion  0-50 mcg/kg/min IntraVENous TITRATE  fentaNYL (PF) 1,500 mcg/30 mL (50 mcg/mL) infusion  0-200 mcg/hr IntraVENous TITRATE  sodium bicarbonate (8.4%) 150 mEq in sterile water 1,000 mL infusion   IntraVENous CONTINUOUS  
 PHENYLephrine (NATE-SYNEPHRINE) 100 mg in 0.9% sodium chloride 250 mL infusion   mcg/min IntraVENous TITRATE  cefepime (MAXIPIME) 1 g in 0.9% sodium chloride (MBP/ADV) 50 mL  1 g IntraVENous Q24H  
 vancomycin: Pharmacy dosing by random level   Other Rx Dosing/Monitoring  0.9% sodium chloride infusion 250 mL  250 mL IntraVENous PRN  polyethylene glycol (MIRALAX) packet 17 g  17 g Oral BID  senna-docusate (PERICOLACE) 8.6-50 mg per tablet 1 Tab  1 Tab Oral DAILY  dexAMETHasone (DECADRON) tablet 10 mg  10 mg Oral DAILY  phenyleph-min oil-petrolatum (PREPARATION H) 0.25-14-74.9 % ointment   PeriANAL QID PRN  
 sodium chloride (NS) flush 5-40 mL  5-40 mL IntraVENous Q8H  
 sodium chloride (NS) flush 5-40 mL  5-40 mL IntraVENous PRN  
 acetaminophen (TYLENOL) tablet 650 mg  650 mg Oral Q4H PRN  
 naloxone (NARCAN) injection 0.4 mg  0.4 mg IntraVENous PRN  
 ondansetron (ZOFRAN) injection 4 mg  4 mg IntraVENous Q4H PRN  
 ascorbic acid (vitamin C) (VITAMIN C) tablet 500 mg  500 mg Oral BID  zinc sulfate (ZINCATE) 220 (50) mg capsule 1 Cap  1 Cap Oral DAILY  atorvastatin (LIPITOR) tablet 40 mg  40 mg Oral DAILY  guaiFENesin ER (MUCINEX) tablet 1,200 mg  1,200 mg Oral BID  
 
 
 
 LAB AND IMAGING FINDINGS:  
 
Lab Results Component Value Date/Time WBC 32.2 (H) 06/22/2020 03:21 AM  
 HGB 8.7 (L) 06/22/2020 03:21 AM  
 PLATELET 43 (LL) 48/26/7384 03:21 AM  
 
Lab Results Component Value Date/Time Sodium 132 (L) 06/22/2020 03:21 AM  
 Potassium 4.4 06/22/2020 03:21 AM  
 Chloride 96 (L) 06/22/2020 03:21 AM  
 CO2 21 06/22/2020 03:21 AM  
 BUN 80 (H) 06/22/2020 03:21 AM  
 Creatinine 5.39 (H) 06/22/2020 03:21 AM  
 Calcium <5.0 (LL) 06/22/2020 03:21 AM  
 Magnesium 2.0 06/20/2020 01:03 PM  
 Phosphorus 7.1 (H) 06/22/2020 03:21 AM  
  
Lab Results Component Value Date/Time Alk. phosphatase 99 06/20/2020 04:44 AM  
 Protein, total 4.4 (L) 06/20/2020 04:44 AM  
 Albumin 1.4 (L) 06/22/2020 03:21 AM  
 Globulin 2.1 06/20/2020 04:44 AM  
 
Lab Results Component Value Date/Time INR 2.3 (H) 06/22/2020 03:21 AM  
 Prothrombin time 22.4 (H) 06/22/2020 03:21 AM  
 aPTT 42.8 (H) 06/20/2020 12:16 AM  
  
Lab Results Component Value Date/Time Ferritin 19,654 (H) 06/22/2020 03:21 AM  
  
Lab Results Component Value Date/Time  
 pH 6.80 (LL) 06/21/2020 09:51 AM  
 PCO2 81 (H) 06/21/2020 09:51 AM  
 PO2 20 (LL) 06/21/2020 09:51 AM  
 
 No components found for: [place] Lab Results Component Value Date/Time  06/05/2020 06:39 PM  
  
 
 
   
 
Total time:  70m Counseling / coordination time, spent as noted above: 45m 
> 50% counseling / coordination?: y 
 
Prolonged service was provided for  []30 min   []75 min in face to face time in the presence of the patient, spent as noted above. Time Start:  
Time End:  
Note: this can only be billed with 07962 (initial) or 84256 (follow up). If multiple start / stop times, list each separately.

## 2020-06-22 NOTE — PROGRESS NOTES
[facility] June 22, 2020 RE: Terrence Gamez To Whom It May Concern, This is to certify that Terrence Gamez has a life threatening condition, requiring mechanical ventilation and ICU admission. She has been at [address] [place] [address] from 06/05/20 to present Please feel free to contact my office if you have any questions or concerns. Thank you for your assistance in this matter. Sincerely, Mi Shields MD 
[phone]

## 2020-06-22 NOTE — PROGRESS NOTES
[facility] 
[address] [facility] [employer], [address] [place] [address] 
[phone] Medical Progress Note NAME: Cy Luna :  1945 MRM:  [id number] Date/Time of service: 2020  8:40 AM 
 
  
Subjective: Chief Complaint:  Patient was personally seen and examined by me during this time period. Chart reviewed. Very critically ill. Remains intubated, sedated, on multiple pressors Objective:  
 
 
Vitals:  
 
 
Last 24hrs VS reviewed since prior progress note. Most recent are: 
 
Visit Vitals BP (!) 91/17 Pulse (!) 127 Temp 99.5 °F (37.5 °C) Resp 28 Ht 4' 11\" (1.499 m) Wt 78 kg (171 lb 15.3 oz) SpO2 99% BMI 34.73 kg/m² SpO2 Readings from Last 6 Encounters:  
20 99% O2 Flow Rate (L/min): 7 l/min Intake/Output Summary (Last 24 hours) at 2020 6161 Last data filed at 2020 0600 Gross per 24 hour Intake 6359.1 ml Output 127 ml Net 6232.1 ml Exam:  
 
Physical Exam: 
 
Gen:  Disheveled, ill-appearing, intubated HEENT:  Pink conjunctivae, sluggish pupils, moist mucous membranes Neck:  Supple, without masses Resp:  No accessory muscle use, bilateral coarse BS Card:  No murmurs, normal S1, S2 without thrills, + edema Abd:  Soft, non-tender, non-distended Musc:  No cyanosis or clubbing Skin:  No rashes, bruising of L hip Neuro:  sedated Psych:  sedated Medications Reviewed: (see below) Lab Data Reviewed: (see below) 
 
______________________________________________________________________ Medications:  
 
Current Facility-Administered Medications Medication Dose Route Frequency  [START ON 2020] Vancomycin random level  with AM labs    Other ONCE  
 0.9% sodium chloride infusion 250 mL  250 mL IntraVENous PRN  pantoprazole (PROTONIX) 40 mg in 0.9% sodium chloride 10 mL injection  40 mg IntraVENous Q12H  
 metroNIDAZOLE (FLAGYL) IVPB premix 500 mg  500 mg IntraVENous Q12H  insulin lispro (HUMALOG) injection   SubCUTAneous Q6H  
 glucose chewable tablet 16 g  4 Tab Oral PRN  
 dextrose (D50W) injection syrg 12.5-25 g  12.5-25 g IntraVENous PRN  
 glucagon (GLUCAGEN) injection 1 mg  1 mg IntraMUSCular PRN  
 NOREPINephrine (LEVOPHED) 32,000 mcg in dextrose 5% 250 mL (128 mcg/mL) infusion  0.5-200 mcg/min IntraVENous TITRATE  oxyCODONE-acetaminophen (PERCOCET) 5-325 mg per tablet 1 Tab  1 Tab Oral Q6H PRN  propofol (DIPRIVAN) 10 mg/mL infusion  0-50 mcg/kg/min IntraVENous TITRATE  fentaNYL (PF) 1,500 mcg/30 mL (50 mcg/mL) infusion  0-200 mcg/hr IntraVENous TITRATE  vasopressin (VASOSTRICT) 20 Units in 0.9% sodium chloride 100 mL infusion  0-0.1 Units/min IntraVENous CONTINUOUS  
 sodium bicarbonate (8.4%) 150 mEq in sterile water 1,000 mL infusion   IntraVENous CONTINUOUS  
 PHENYLephrine (NATE-SYNEPHRINE) 100 mg in 0.9% sodium chloride 250 mL infusion   mcg/min IntraVENous TITRATE  cefepime (MAXIPIME) 1 g in 0.9% sodium chloride (MBP/ADV) 50 mL  1 g IntraVENous Q24H  
 vancomycin: Pharmacy dosing by random level   Other Rx Dosing/Monitoring  0.9% sodium chloride infusion 250 mL  250 mL IntraVENous PRN  polyethylene glycol (MIRALAX) packet 17 g  17 g Oral BID  senna-docusate (PERICOLACE) 8.6-50 mg per tablet 1 Tab  1 Tab Oral DAILY  dexAMETHasone (DECADRON) tablet 10 mg  10 mg Oral DAILY  phenyleph-min oil-petrolatum (PREPARATION H) 0.25-14-74.9 % ointment   PeriANAL QID PRN  
 sodium chloride (NS) flush 5-40 mL  5-40 mL IntraVENous Q8H  
 sodium chloride (NS) flush 5-40 mL  5-40 mL IntraVENous PRN  
 acetaminophen (TYLENOL) tablet 650 mg  650 mg Oral Q4H PRN  
 naloxone (NARCAN) injection 0.4 mg  0.4 mg IntraVENous PRN  
 ondansetron (ZOFRAN) injection 4 mg  4 mg IntraVENous Q4H PRN  
 morphine injection 2 mg  2 mg IntraVENous Q4H PRN  
 ascorbic acid (vitamin C) (VITAMIN C) tablet 500 mg  500 mg Oral BID  
  zinc sulfate (ZINCATE) 220 (50) mg capsule 1 Cap  1 Cap Oral DAILY  atorvastatin (LIPITOR) tablet 40 mg  40 mg Oral DAILY  guaiFENesin ER (MUCINEX) tablet 1,200 mg  1,200 mg Oral BID Lab Review:  
 
Recent Labs  
  06/22/20 
0321 06/21/20 
2157 06/21/20 
1613 WBC 32.2* 39.3* 43.1* HGB 8.7* 8.9* 8.7* HCT 25.0* 25.9* 26.6*  
PLT 43* 44* 46* Recent Labs  
  06/22/20 
0321 06/21/20 
0345 06/20/20 
1303 06/20/20 
5782 06/20/20 
0444 * 138 144 144 143  
K 4.4 5.1 4.7 4.0 4.1 CL 96* 105 112* 113* 114* CO2 21 10* 22 20* 17* * 232* 96 130* 275* BUN 80* 71* 74* 73* 72* CREA 5.39* 5.10* 4.17* 4.15* 4.14* CA <5.0* 6.0* 6.7* 6.7* 6.8*  
MG  --   --  2.0 2.1 1.9 PHOS 7.1* 8.4*  --   --  4.5 ALB 1.4* 1.5*  --   --  2.3* TBILI  --   --   --   --  0.7 ALT  --   --   --   --  94* INR 2.3* 1.8*  --   --  1.5* Lab Results Component Value Date/Time Glucose (POC) 338 (H) 06/22/2020 06:44 AM  
 Glucose (POC) 372 (H) 06/21/2020 11:18 PM  
 Glucose (POC) 329 (H) 06/21/2020 05:29 PM  
 Glucose (POC) 341 (H) 06/21/2020 11:12 AM  
 Glucose (POC) 137 (H) 06/21/2020 06:06 AM  
 
 
  
Assessment / Plan:  
 
66 yo hx of HTN, DM, presented w/ hypoxia, COVID PNA, s/p course of remdesivir. Hospital course complicated by fall, Code Blue on 06/19, intubated. Also complicated by new L femoral neck fx, renal failure. Poor prognosis 1) Acute resp failure/hypoxia: no improvement. Due to COVID-19. Intubated on 06/19 after a fall, respiratory arrest.  Last pH 6.8. Cont Vent support. Pulm following 2) Septic shock/PNA from COVID-19: worsening. Due to COVID-19 and blood loss anemia. Poor prognosis. High chance of death. Cont triple pressors, IV Vanc, cefepime, flagyl, dexamethasone, Vit C, Zinc.  Consult Palliative Care since family was thinking about comfort care 3) LELSEY: worsening. Due to COVID, septic shock. Family declined dialysis. Renal following 4) Acute met acidosis/lactic acidosis: due to septic shock. Cont bicarb gtt 5) Acute blood loss anemia: due to L hip fracture from all. S/p 4u RBCs. Cont to monitor Hgb, transfuse prn 
 
6) L hip fracture: due to fall. Too unstable for surgery. Ortho following 7) Elevated liver enzymes: due to COVID, septic shock. Will monitor 8) New type 2 DM: A1C 7.9%. Cont SSI prn. Was on insulin gtt Code: DNR Total time spent with patient: 28 Minutes (I personally spent time on critical care) **I personally saw and examined the patient during this time period** Care Plan discussed with: nursing, intensivist 
 
Discussed:  Care Plan Prophylaxis:  SCD's (due to acute bleeding from hip fx) Disposition:  hospice, comfort care 
        
___________________________________________________ Attending Physician: Kaila García MD

## 2020-06-22 NOTE — PROGRESS NOTES
Transition of care note: 
RUR 30% LOS 16 days,GLOS 5.5 Pt is Covid 19 positive. Plan: 1. Pt was Code Blue on 6/19/20 on the 4th floor due to agonal breathing,hypoxia, and acute respiratory failure 2. Pt was emergently intubated on 6/19/20 and transferred to ICU 3.Pt had fallen on the floor resulting in a left femur fracture. 4.Per discussions with family over the weekend,family does not want pt to have dialysis . 5.I  discussed Palliative Medicine consult with attending and order has been placed for goals of care discussion. 6.Daughter is Flora Antunez. Her number is [phone]. 6.Son -in-law is Merlin Cordial. He is listed as having the same number as Chelsie. 7.Pt has been on levophed,brittnee,vasopressin and bicarb gtts. 8.Pt is being reevaluated by [facility] for medicaid due to shock and poor prognosis. 9.I am continuing to follow pt for discharge needs.  
 
Fernando Lacy

## 2020-06-22 NOTE — PROGRESS NOTES
Provided  services remotely to Dr. Vaishnavi Pitt, [address] [facility] with Palliative and olu [place] during a family meeting. manav [address] 
[phone]

## 2020-06-22 NOTE — PROGRESS NOTES
Suresh Hutchinson [facility] [address] YOB: 1945 Assessment & Plan:  
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 
HL 
Lactic acidosis Shock, on pressors Resp failure on vent Hypocalcemia Rec: 
DNR/no dialysis per family Prognosis grim Give IV calcium No evidence of improvement in renal function. No specific therapy available besides supportive care. Subjective:  
CC: F/u LESLEY 
HPI: Oliguric. On vent for resp failure. On pressors for shock. On bcb drip for severe acidosis. ROS unobtainable due to pt condition Current Facility-Administered Medications Medication Dose Route Frequency  [START ON 6/23/2020] Vancomycin random level 6/23 with AM labs    Other ONCE  
 0.9% sodium chloride infusion 250 mL  250 mL IntraVENous PRN  pantoprazole (PROTONIX) 40 mg in 0.9% sodium chloride 10 mL injection  40 mg IntraVENous Q12H  
 metroNIDAZOLE (FLAGYL) IVPB premix 500 mg  500 mg IntraVENous Q12H  
 insulin lispro (HUMALOG) injection   SubCUTAneous Q6H  
 glucose chewable tablet 16 g  4 Tab Oral PRN  
 dextrose (D50W) injection syrg 12.5-25 g  12.5-25 g IntraVENous PRN  
 glucagon (GLUCAGEN) injection 1 mg  1 mg IntraMUSCular PRN  
 NOREPINephrine (LEVOPHED) 32,000 mcg in dextrose 5% 250 mL (128 mcg/mL) infusion  0.5-200 mcg/min IntraVENous TITRATE  oxyCODONE-acetaminophen (PERCOCET) 5-325 mg per tablet 1 Tab  1 Tab Oral Q6H PRN  propofol (DIPRIVAN) 10 mg/mL infusion  0-50 mcg/kg/min IntraVENous TITRATE  fentaNYL (PF) 1,500 mcg/30 mL (50 mcg/mL) infusion  0-200 mcg/hr IntraVENous TITRATE  vasopressin (VASOSTRICT) 20 Units in 0.9% sodium chloride 100 mL infusion  0-0.1 Units/min IntraVENous CONTINUOUS  
 sodium bicarbonate (8.4%) 150 mEq in sterile water 1,000 mL infusion   IntraVENous CONTINUOUS  
 PHENYLephrine (NATE-SYNEPHRINE) 100 mg in 0.9% sodium chloride 250 mL infusion   mcg/min IntraVENous TITRATE  cefepime (MAXIPIME) 1 g in 0.9% sodium chloride (MBP/ADV) 50 mL  1 g IntraVENous Q24H  
 vancomycin: Pharmacy dosing by random level   Other Rx Dosing/Monitoring  0.9% sodium chloride infusion 250 mL  250 mL IntraVENous PRN  polyethylene glycol (MIRALAX) packet 17 g  17 g Oral BID  senna-docusate (PERICOLACE) 8.6-50 mg per tablet 1 Tab  1 Tab Oral DAILY  dexAMETHasone (DECADRON) tablet 10 mg  10 mg Oral DAILY  phenyleph-min oil-petrolatum (PREPARATION H) 0.25-14-74.9 % ointment   PeriANAL QID PRN  
 sodium chloride (NS) flush 5-40 mL  5-40 mL IntraVENous Q8H  
 sodium chloride (NS) flush 5-40 mL  5-40 mL IntraVENous PRN  
 acetaminophen (TYLENOL) tablet 650 mg  650 mg Oral Q4H PRN  
 naloxone (NARCAN) injection 0.4 mg  0.4 mg IntraVENous PRN  
 ondansetron (ZOFRAN) injection 4 mg  4 mg IntraVENous Q4H PRN  
 morphine injection 2 mg  2 mg IntraVENous Q4H PRN  
 ascorbic acid (vitamin C) (VITAMIN C) tablet 500 mg  500 mg Oral BID  zinc sulfate (ZINCATE) 220 (50) mg capsule 1 Cap  1 Cap Oral DAILY  atorvastatin (LIPITOR) tablet 40 mg  40 mg Oral DAILY  guaiFENesin ER (MUCINEX) tablet 1,200 mg  1,200 mg Oral BID Objective:  
 
Vitals: 
Blood pressure (!) 91/17, pulse (!) 127, temperature 99.5 °F (37.5 °C), resp. rate 28, height 4' 11\" (1.499 m), weight 78 kg (171 lb 15.3 oz), SpO2 99 %. Temp (24hrs), Av.8 °F (37.1 °C), Min:97.6 °F (36.4 °C), Max:100.3 °F (37.9 °C) Intake and Output: 
No intake/output data recorded.  1901 -  0700 In: 66872.1 [I.V.:9559.1] Out: 143 [Urine:23] Physical Exam:              
IN person exam deferred due to COVID isolation status GEN sedated on vent CV tachy ECG/rhythm: 
 
Data Review No results for input(s): TNIPOC in the last 72 hours. No lab exists for component: ITNL Recent Labs  
  20 
2245 TROIQ 0.16* Recent Labs  
  20 
0321 20 2157 06/21/20 
1613  06/21/20 
0345 06/20/20 
1303 06/20/20 
2735 06/20/20 
4693 *  --   --   --  138  --  144 144 143  
K 4.4  --   --   --  5.1  --  4.7 4.0 4.1 CL 96*  --   --   --  105  --  112* 113* 114* CO2 21  --   --   --  10*  --  22 20* 17* BUN 80*  --   --   --  71*  --  74* 73* 72* CREA 5.39*  --   --   --  5.10*  --  4.17* 4.15* 4.14* *  --   --   --  232*  --  96 130* 275* PHOS 7.1*  --   --   --  8.4*  --   --   --  4.5 MG  --   --   --   --   --   --  2.0 2.1 1.9  
CA <5.0*  --   --   --  6.0*  --  6.7* 6.7* 6.8* ALB 1.4*  --   --   --  1.5*  --   --   --  2.3* WBC 32.2* 39.3* 43.1*  --  40.5*  --   --   --  24.1* HGB 8.7* 8.9* 8.7*   < > 8.0*   < >  --  11.7 11.6 HCT 25.0* 25.9* 26.6*   < > 24.8*  --   --   --  34.4*  
PLT 43* 44* 46*  --  58*  --   --   --  116*  
 < > = values in this interval not displayed. Recent Labs  
  06/22/20 
0321 06/21/20 
0345 06/20/20 
0444 06/20/20 
0016  06/19/20 
2133 06/19/20 
1220 INR 2.3* 1.8* 1.5* 1.5*   < >  --   --   
PTP 22.4* 18.2* 15.1* 15.4*   < >  --   --   
APTT  --   --   --  42.8*  --  49.9* 93.4*  
 < > = values in this interval not displayed. Needs: urine analysis, urine sodium, protein and creatinine Lab Results Component Value Date/Time Sodium,urine random 80 06/07/2020 09:03 PM  
 Creatinine, urine 59.00 06/07/2020 09:03 PM  
 Creatinine, urine 62.10 06/07/2020 09:03 PM  
 
 
 
 
: Sowmya Dykes MD 
6/22/2020 [facility]: 
[email or web address] [place] office: 
[address], [address] [place], [address] [facility] Phone: [phone] Fax :     [phone] [place] office: 
[address] [facility], [address] Phone - [phone] Fax - [phone]

## 2020-06-22 NOTE — PROGRESS NOTES
Palliative Medicine Code Status: DNR Advance Care Planning: 
Advance Care Planning 6/22/2020 Patient's Healthcare Decision Maker is: Legal Next of Kin Confirm Advance Directive None Patient Would Like to Complete Advance Directive Unable Patient / Family Encounter Documentation Participants (names): Dtr Filemon Ott (by phone), son-in-law Shanta Cordon (by phone), Chantelle Spencer (by phone), Justina Body, Palliative Medicine (Dr. Sarkis Reyes, [facility]) Narrative: Palliative Medicine and  spoke with family by phone with assistance from [employer], who served as . Pt is currently intubated, unable to participate in conversation. Dtr verbalized understanding of pt's poor prognosis, stated family had been given the option of either discontinuing life sustaining measures (ventilator, pressors) vs continuing current measures until cardiac arrest; pt has DNR order in place. Dtr lives locally, stated she has spoken with her brothers who reside in [place] [address], stated all are aware and accepting that death is approaching but prefer that death come in spite of current interventions, not because family elected to discontinue treatment. Dtr did agree for pt to receive medications to address signs/symptoms of distress. Dtr also gave permission not to escalate care in the event that condition deteriorates further. Pt does not have AMD on file, adult children are legal NOK/surrogate decision makers. Psychosocial Issues Identified/ Resilience Factors:  Family verbalized understanding that death is approaching, identified mirella as a source of comfort/strength, no spiritual needs identified at this time. Goals of Care / Plan: Continue current measures but do not escalate care if condition further deteriorates. Pt has DNR order in place, remains intubated. Dtr plans to visit later today. Thank you for including Palliative Medicine in the care of Ms. Abraham Davis. Vashti  BETITO Harry, [facility]-SW 
[phone]

## 2020-06-22 NOTE — PROGRESS NOTES
[facility], Critical Care, and Sleep Medicine Progress Note Name: Charlee Piña MRN: [id number] : 1945 Hospital: [address] [facility] Date: 2020 IMPRESSION:  
· Acute hypoxemic respiratory failure, intubated  · AGMA · Acute blood loss anemia · Shock · Left hip fracture · COVID-19+ · Elevated troponin · LESLEY on CKD · HTN 
· Hyperglycemia RECOMMENDATIONS:  
· Continue vent support, requiring FiO2 100% · Continue pressors to goal MAP 65; maxed out on 3 pressors · Continue bicarb · Antibiotics resumed given severity of illness, agree with continuing for now · S/p 3 unit PRBC · Trend H&H 
· Ortho consult; too unstable for any imaging or intervention · Too unstable for CT hip right now · Head CT post fall negative · Off heparin drip · Approaching need for CRRT but family declined · Insulin drip · Stopped Anthony on  · Continue Dexamethasone for 10 days total. 
· Trend inflammatory markers · S/P course of Remdesivir; completed  · Continue vitamin C, zinc, lipitor · NPO 
· SCD Prognosis poor at this point with multiorgan failure and refractory shock and hypoxia. Following discussion with palliative care today, patient is DNR, no escalation of care Patient is critically ill and at high risk of decompensation. CCT 32 minutes Subjective:  
 
Ms. Adriana Castorena is a 73yo female w/ history of HTN who presented to the ER on  w/ complaints of shortness of breath, weakness and chest pain. Sats 93% on RA. Has family members that are COVID+. Home med list includes 10mg prednisone that she has apparently been taking since April for \"pain. \" 
 
 Interval history Afebrile HR 70-80s; better Sats 95% on 1L NC 
WBC 16.1; better Hgb 8.9 Pro-BNP 2212; better ; worse CRP 5.8; better Feritin 443; worse D-dimer . 79; better Creat 3.74; better LFTs increased, , ALT 54 
200mL urine output documented 6/20: Eventful night. Patient got up in her room without assistance and fell, seemingly fracturing her left hip. Subsequently developed hypotension and AMS and a code blue was called. Patient did not lose a pulse but did require intubation by anesthesia. Labs drawn revealed a hemoglobin of 3.6. Started on pressors and given 3 unit PRBC. This morning remains intubated, sedated on norepi and vasopressin. 6/21: Another difficult night. BP remains low despite max doses of brittnee, norepi, and vaso. Family yesterday met with hospitalist as they do not want CRRT--changed code status to DNR but not ready for comfort measures yet. Patient remains intubated and sedated this morning. 
 
6/22: per palliative's discussion, family not able to make decision to withdraw life support but do agree w/ DNR and no escalation of care knowing that the patient's death is expected Past Medical History:  
Diagnosis Date  
 HTN (hypertension), benign No past surgical history on file. Prior to Admission medications Medication Sig Start Date End Date Taking? Authorizing Provider  
lisinopriL (PRINIVIL, ZESTRIL) 5 mg tablet Take 5 mg by mouth daily. Yes Provider, Historical  
predniSONE (DELTASONE) 5 mg tablet Take 10 mg by mouth daily. 6/2/20 7/2/20 Yes Provider, Historical  
 
No Known Allergies Social History Tobacco Use  Smoking status: Never Smoker  Smokeless tobacco: Never Used Substance Use Topics  Alcohol use: Never Frequency: Never Family History Problem Relation Age of Onset  Diabetes Neg Hx Current Facility-Administered Medications Medication Dose Route Frequency  [START ON 6/23/2020] Vancomycin random level 6/23 with AM labs    Other ONCE  
 vasopressin (VASOSTRICT) 20 Units in 0.9% sodium chloride 100 mL infusion  0.04 Units/min IntraVENous CONTINUOUS  
 pantoprazole (PROTONIX) 40 mg in 0.9% sodium chloride 10 mL injection  40 mg IntraVENous Q12H  metroNIDAZOLE (FLAGYL) IVPB premix 500 mg  500 mg IntraVENous Q12H  
 insulin lispro (HUMALOG) injection   SubCUTAneous Q6H  
 NOREPINephrine (LEVOPHED) 32,000 mcg in dextrose 5% 250 mL (128 mcg/mL) infusion  0.5-200 mcg/min IntraVENous TITRATE  propofol (DIPRIVAN) 10 mg/mL infusion  0-50 mcg/kg/min IntraVENous TITRATE  fentaNYL (PF) 1,500 mcg/30 mL (50 mcg/mL) infusion  0-200 mcg/hr IntraVENous TITRATE  sodium bicarbonate (8.4%) 150 mEq in sterile water 1,000 mL infusion   IntraVENous CONTINUOUS  
 PHENYLephrine (NATE-SYNEPHRINE) 100 mg in 0.9% sodium chloride 250 mL infusion   mcg/min IntraVENous TITRATE  cefepime (MAXIPIME) 1 g in 0.9% sodium chloride (MBP/ADV) 50 mL  1 g IntraVENous Q24H  
 vancomycin: Pharmacy dosing by random level   Other Rx Dosing/Monitoring  polyethylene glycol (MIRALAX) packet 17 g  17 g Oral BID  senna-docusate (PERICOLACE) 8.6-50 mg per tablet 1 Tab  1 Tab Oral DAILY  dexAMETHasone (DECADRON) tablet 10 mg  10 mg Oral DAILY  sodium chloride (NS) flush 5-40 mL  5-40 mL IntraVENous Q8H  
 ascorbic acid (vitamin C) (VITAMIN C) tablet 500 mg  500 mg Oral BID  zinc sulfate (ZINCATE) 220 (50) mg capsule 1 Cap  1 Cap Oral DAILY  atorvastatin (LIPITOR) tablet 40 mg  40 mg Oral DAILY  guaiFENesin ER (MUCINEX) tablet 1,200 mg  1,200 mg Oral BID Objective:  
Vital Signs:   
Visit Vitals /74 Pulse (!) 126 Temp 99.4 °F (37.4 °C) Resp (!) 35 Ht 4' 11\" (1.499 m) Wt 78 kg (171 lb 15.3 oz) SpO2 100% BMI 34.73 kg/m² O2 Device: Ventilator O2 Flow Rate (L/min): 7 l/min Temp (24hrs), Av.5 °F (37.5 °C), Min:97.8 °F (36.6 °C), Max:100.3 °F (37.9 °C) Intake/Output:  
Last shift:       07 -  1900 In: 1020.7 [I.V.:920.7] Out: 40 [Urine:40] Last 3 shifts: 1 -  0700 In: 73046.1 [I.V.:9559.1] Out: 143 [Urine:23] Intake/Output Summary (Last 24 hours) at 2020 1412 Last data filed at 6/22/2020 1200 Gross per 24 hour Intake 5384.65 ml Output 47 ml Net 5337.65 ml Physical Exam:  
General:  Intubated, sedated, no distress Head:  NCAT Eyes:  EOMI, conjunctive clear Nose: Nares normal, NC in place Throat: MMM Neck:  Supple, FROM, trachea midline Lungs:   Deferred Chest wall:  Normal shape, nontender Heart:  Deferred Abdomen:   Obese, soft, NT Extremities: No c/c/e Pulses: Deferred Skin: CDI, no rash Lymph nodes: Deferred Neurologic: Sedated Exam limited to avoid unnecessary exposure and to preserve PPE during [address] pandemic. Data review:  
 
Recent Results (from the past 24 hour(s)) LACTIC ACID Collection Time: 06/21/20  4:13 PM  
Result Value Ref Range Lactic acid 14.6 (HH) 0.4 - 2.0 MMOL/L  
CBC WITH AUTOMATED DIFF Collection Time: 06/21/20  4:13 PM  
Result Value Ref Range WBC 43.1 (H) 3.6 - 11.0 K/uL  
 RBC 2.97 (L) 3.80 - 5.20 M/uL HGB 8.7 (L) 11.5 - 16.0 g/dL HCT 26.6 (L) 35.0 - 47.0 % MCV 89.6 80.0 - 99.0 FL  
 MCH 29.3 26.0 - 34.0 PG  
 MCHC 32.7 30.0 - 36.5 g/dL  
 RDW 15.9 (H) 11.5 - 14.5 % PLATELET 46 (LL) 557 - 400 K/uL MPV 11.8 8.9 - 12.9 FL  
 NRBC 8.4 (H) 0  WBC ABSOLUTE NRBC 3.63 (H) 0.00 - 0.01 K/uL NEUTROPHILS 64 32 - 75 % BAND NEUTROPHILS 6 0 - 6 % LYMPHOCYTES 8 (L) 12 - 49 % MONOCYTES 9 5 - 13 % EOSINOPHILS 0 0 - 7 % BASOPHILS 0 0 - 1 % METAMYELOCYTES 6 (H) 0 % MYELOCYTES 7 (H) 0 % IMMATURE GRANULOCYTES 0 %  
 ABS. NEUTROPHILS 30.2 (H) 1.8 - 8.0 K/UL  
 ABS. LYMPHOCYTES 3.4 0.8 - 3.5 K/UL  
 ABS. MONOCYTES 3.9 (H) 0.0 - 1.0 K/UL  
 ABS. EOSINOPHILS 0.0 0.0 - 0.4 K/UL  
 ABS. BASOPHILS 0.0 0.0 - 0.1 K/UL  
 ABS. IMM. GRANS. 0.0 K/UL  
 DF MANUAL    
 RBC COMMENTS ANISOCYTOSIS 
1+ WBC COMMENTS TOXIC GRANULATION    
GLUCOSE, POC Collection Time: 06/21/20  5:29 PM  
Result Value Ref Range Glucose (POC) 329 (H) 65 - 100 mg/dL Performed by Luis Menezes LACTIC ACID Collection Time: 06/21/20  9:57 PM  
Result Value Ref Range Lactic acid 10.8 (HH) 0.4 - 2.0 MMOL/L  
CBC W/O DIFF Collection Time: 06/21/20  9:57 PM  
Result Value Ref Range WBC 39.3 (H) 3.6 - 11.0 K/uL  
 RBC 2.99 (L) 3.80 - 5.20 M/uL HGB 8.9 (L) 11.5 - 16.0 g/dL HCT 25.9 (L) 35.0 - 47.0 % MCV 86.6 80.0 - 99.0 FL  
 MCH 29.8 26.0 - 34.0 PG  
 MCHC 34.4 30.0 - 36.5 g/dL  
 RDW 15.9 (H) 11.5 - 14.5 % PLATELET 44 (LL) 134 - 400 K/uL NRBC 12.3 (H) 0  WBC ABSOLUTE NRBC 4.82 (H) 0.00 - 0.01 K/uL GLUCOSE, POC Collection Time: 06/21/20 11:18 PM  
Result Value Ref Range Glucose (POC) 372 (H) 65 - 100 mg/dL Performed by [facility] PROTHROMBIN TIME + INR Collection Time: 06/22/20  3:21 AM  
Result Value Ref Range INR 2.3 (H) 0.9 - 1.1 Prothrombin time 22.4 (H) 9.0 - 11.1 sec FIBRINOGEN Collection Time: 06/22/20  3:21 AM  
Result Value Ref Range Fibrinogen 141 (L) 200 - 475 mg/dL D DIMER Collection Time: 06/22/20  3:21 AM  
Result Value Ref Range D-dimer 11.31 (H) 0.00 - 0.65 mg/L FEU FERRITIN Collection Time: 06/22/20  3:21 AM  
Result Value Ref Range Ferritin 19,654 (H) 26 - 388 NG/ML  
RENAL FUNCTION PANEL Collection Time: 06/22/20  3:21 AM  
Result Value Ref Range Sodium 132 (L) 136 - 145 mmol/L Potassium 4.4 3.5 - 5.1 mmol/L Chloride 96 (L) 97 - 108 mmol/L  
 CO2 21 21 - 32 mmol/L Anion gap 15 5 - 15 mmol/L Glucose 323 (H) 65 - 100 mg/dL BUN 80 (H) 6 - 20 MG/DL Creatinine 5.39 (H) 0.55 - 1.02 MG/DL  
 BUN/Creatinine ratio 15 12 - 20 GFR est AA 9 (L) >60 ml/min/1.73m2 GFR est non-AA 8 (L) >60 ml/min/1.73m2 Calcium <5.0 (LL) 8.5 - 10.1 MG/DL Phosphorus 7.1 (H) 2.6 - 4.7 MG/DL Albumin 1.4 (L) 3.5 - 5.0 g/dL LACTIC ACID Collection Time: 06/22/20  3:21 AM  
Result Value Ref Range Lactic acid 8.4 (HH) 0.4 - 2.0 MMOL/L  
VANCOMYCIN, RANDOM Collection Time: 06/22/20  3:21 AM  
Result Value Ref Range Vancomycin, random 23.2 UG/ML  
CBC WITH AUTOMATED DIFF Collection Time: 06/22/20  3:21 AM  
Result Value Ref Range WBC 32.2 (H) 3.6 - 11.0 K/uL  
 RBC 2.93 (L) 3.80 - 5.20 M/uL HGB 8.7 (L) 11.5 - 16.0 g/dL HCT 25.0 (L) 35.0 - 47.0 % MCV 85.3 80.0 - 99.0 FL  
 MCH 29.7 26.0 - 34.0 PG  
 MCHC 34.8 30.0 - 36.5 g/dL  
 RDW 15.8 (H) 11.5 - 14.5 % PLATELET 43 (LL) 797 - 400 K/uL NRBC 20.0 (H) 0  WBC ABSOLUTE NRBC 6.42 (H) 0.00 - 0.01 K/uL NEUTROPHILS 67 32 - 75 % BAND NEUTROPHILS 14 (H) 0 - 6 % LYMPHOCYTES 9 (L) 12 - 49 % MONOCYTES 6 5 - 13 % EOSINOPHILS 0 0 - 7 % BASOPHILS 0 0 - 1 % METAMYELOCYTES 4 (H) 0 % IMMATURE GRANULOCYTES 0 %  
 ABS. NEUTROPHILS 26.1 (H) 1.8 - 8.0 K/UL  
 ABS. LYMPHOCYTES 2.9 0.8 - 3.5 K/UL  
 ABS. MONOCYTES 1.9 (H) 0.0 - 1.0 K/UL  
 ABS. EOSINOPHILS 0.0 0.0 - 0.4 K/UL  
 ABS. BASOPHILS 0.0 0.0 - 0.1 K/UL  
 ABS. IMM. GRANS. 0.0 K/UL  
 DF MANUAL    
 RBC COMMENTS POLYCHROMASIA 1+ 
    
C REACTIVE PROTEIN, QT Collection Time: 06/22/20  3:21 AM  
Result Value Ref Range C-Reactive protein 10.10 (H) 0.00 - 0.60 mg/dL LD Collection Time: 06/22/20  3:21 AM  
Result Value Ref Range LD >4,000 (H) 81 - 246 U/L  
NT-PRO BNP Collection Time: 06/22/20  3:21 AM  
Result Value Ref Range NT pro-BNP 9,493 (H) <450 PG/ML  
GLUCOSE, POC Collection Time: 06/22/20  6:44 AM  
Result Value Ref Range Glucose (POC) 338 (H) 65 - 100 mg/dL Performed by Morris Esquivel LACTIC ACID Collection Time: 06/22/20  8:41 AM  
Result Value Ref Range Lactic acid 7.9 (HH) 0.4 - 2.0 MMOL/L  
GLUCOSE, POC Collection Time: 06/22/20 11:38 AM  
Result Value Ref Range Glucose (POC) 279 (H) 65 - 100 mg/dL Performed by [facility]: 
I have personally reviewed the patients radiographs and have reviewed the reports: CXR (6/8/2020): There are diffuse patchy bilateral infiltrates, increased in density as compared to the prior study. CXR (6/12/2020): There is patchy bilateral upper lobe airspace disease that has increased as compared to the prior study. CXR 6/16:  Slight improvement in bilateral infiltration Alejandro Loya MD

## 2020-06-22 NOTE — PROGRESS NOTES
Bedside and Verbal shift change report given to Elizabeth Fernandez RN (oncoming nurse) by Hollie Christianson RN (offgoing nurse). Report included the following information SBAR, Kardex, Intake/Output, MAR, Accordion, Recent Results, Med Rec Status and Cardiac Rhythm sinus tachy. Palliative care spoke with patient's family. We are not withdrawing care, but there will be further aggressive measures. Patient has weeping blisters and approximately 90ml of urine output for the shift. Peep on ventilator is up to 100 and respirations set to 35. Levophed and Clinton have been titrated down some with vasopressin set to steady rate. Fentanyl is maxed at 300 and patient did require (1) 1 mg dose of Versed for pain. Patient was fully bathed and sheets changed. Mouth care performed every 2 hours. Restraints reordered at 6 pm.   
 
Bedside and Verbal shift change report given to [address] [place] [address] (oncoming nurse) by Elizabeth Fernandez RN (offgoing nurse). Report included the following information SBAR, Kardex, Procedure Summary, Intake/Output, MAR, Accordion, Recent Results, Med Rec Status and Cardiac Rhythm sinus tachy.

## 2020-06-22 NOTE — PROGRESS NOTES
[address] Dosing Services: Antimicrobial Stewardship Progress Note Consult for antibiotic dosing of Vancomycin/Cefepime by Dr. Antonino Alexander Pharmacist reviewed antibiotic appropriateness for 66 yo female for indication of Sepsis Day of Therapy: 4 Ht Readings from Last 1 Encounters:  
06/06/20 149.9 cm (59\") Wt Readings from Last 1 Encounters:  
06/06/20 78 kg (171 lb 15.3 oz) Vancomycin therapy: 
Current maintenance dose: Dosing per levels Dose calculated to approximate a therapeutic trough of 15-20 mcg/mL. Last trough level: 23.2 @ 321, drawn ~20 hours post dose=supratherapeutic. Plan for level / Adjustment in Therapy: Continue dosing by levels, patient starting to accumulate medication as renal function worsening (family has refused dialysis/CRRT), will order another random level for tomorrow AM (~48 hours post dose) and dose accordingly based on level. WBC improving but remains significantly elevated, afebrile (tmax 100.3). Dose administration notes:   Doses given appropriately as scheduled Date Dose & Interval Measured (mcg/mL) Extrapolated (mcg/mL) ? 6/21 @ 0345 ?1250mg x 1 given 6/19 @ 2340 ?16.3 (~28 hours post dose) ? ?6/22 ? 750mg x 1 given 6/21 @ 701 ?23.2 (~20 hours post dose) ? ? ? ? ? Non-Kinetic Antimicrobial Dosing Regimen:  
Current Regimen:  Cefepime 1 gm IV q24hr Recommendation: continue same Dose administration notes:   Doses given appropriately as scheduled Other Antimicrobial  
(not dosed by pharmacist) Metronidazole 500mg IV every 12 hours Cultures 6/20: Nares: negative (final) 6/19; Blood: NG x3 days pending 6/19: Blood: NG x3 days pending 6/5: RVP neg (final) Serum Creatinine Lab Results Component Value Date/Time Creatinine 5.39 (H) 06/22/2020 03:21 AM  
  
  
Creatinine Clearance Estimated Creatinine Clearance: 8.5 mL/min (A) (based on SCr of 5.39 mg/dL (H)). Temp Temp: 99.8 °F (37.7 °C) WBC Lab Results Component Value Date/Time WBC 32.2 (H) 06/22/2020 03:21 AM  
 
  
H/H Lab Results Component Value Date/Time HGB 8.7 (L) 06/22/2020 03:21 AM  
 
  
Platelets Lab Results Component Value Date/Time PLATELET 43 (LL) 51/96/8634 03:21 AM  
 
  
 
Pharmacist Murray Garland Contact information: [phone]

## 2020-06-23 NOTE — PROGRESS NOTES
I left a voice message with [place],certified  to please call me back regarding pt.  
 
Jael Langford

## 2020-06-23 NOTE — PROGRESS NOTES
Transition of care note: 
RUR 29% LOS 17 days,GLOS 5.5 days Pt is Covid 19 positive. Plan: 1. I discussed pt with attending this am who has requested daughter come in today after lunch if possible 2. I discussed pt with Palliative Medicine SW who informed me that daughter and family informed the Palliative medicine Team yesterday that their values are not aligned @ this time to stop any interventions but they do agree with no escalation of care. Family is understands that death is near but family prefers that death comes in spite of current interventions. 3.Family has opted for no HD and no escalation of care. 4.I am contacting the certified  @ 10 am per request for her assistance with communication with pt.'s daughter.  
 
Candy Boo

## 2020-06-23 NOTE — PROGRESS NOTES
1913 Received report. 2030 Primary Nurse Samir De Paz, ASHA and Burke Norton,, RN performed a dual skin assessment on this patient Impairment noted- see wound doc flow sheet Sixto score is 8. 
2200 Patient has blisters all over body and weeping. 
0725 Bedside shift change report given to [facility] . Report included the following information SBAR, Kardex, ED Summary, Intake/Output, MAR, Accordion, Recent Results, Med Rec Status and Cardiac Rhythm ST.

## 2020-06-23 NOTE — PROGRESS NOTES
I talked with pt.'s daughter ,Alyse Wise who only understood minimal English. I talked with Lolis,Somali certified  who is contacting [address] [place] [address] regarding Dr Margoth Franklin wanting to meet with daughter today. [place] will be available to assist Dr Margoth Franklin with interpretor today up until 6 pm but she will be interpreting remotely. Lolis's number is [phone]. Palliative physician would also like to be included and would like to be notified . Aleja Rodriguez

## 2020-06-23 NOTE — PROGRESS NOTES
Bedside and Verbal shift change report given to Brianne Traylor (oncoming nurse) by Nessa Rock (offgoing nurse). Report included the following information SBAR, ED Summary, Procedure Summary, Intake/Output, MAR, Accordion, Cardiac Rhythm sinus tach and Alarm Parameters . Primary Nurse Brianne Traylor, RN and Mery RN performed a dual skin assessment on this patient Impairment noted- see wound doc flow sheet Sixto score is 7 Neuro- opens eyes to voice, pupils equal 
Cardiac- sinus tach, unable to auscultate d/t PAPR Resp- Ventilator- PEEP 10 RR 36 FiO2 70  GI/- killian no UO, abdomen distended and rigid, OG to suction. 100ml brown out. Skin- Blisters on extremities, weeping from extremities, blister on abdomen, bruising on extremities and abdomen. Dr. Kira Stout evaled pt. Does not want an increase in vasopressors as that is an elevation in care. Dr. Reino Apley agreed when notified at the door. FiO2 decreased to 70% oer Dr. Kira Stout. Case management working to arrange meeting with pt family and Dr's. Oral care performed. Assessment complete. Pt repositioed but began to desat immediately. Levo 175 Clinton 300 Fent 200 Vaso 0.4 Bicarb 100ml/hr 
 
1200 Reassessment complete. Pt sats lowering when turned, pads underneath pt arms and back are saturated and changed. Pleth wave poor signal. Mouth care complete. 0 Spoke with gunnar with case management, family meeting scheduled for tomorrow at 0 with palliative and attending. [address] Reassessment complete. Attempted pt turning and pt desats quickly as well as HR drops. Mouth care. 1800 Continuous lateral rotation. Insulin given. Mouth care. Bedside and Verbal shift change report given to Bryant Stephens (oncoming nurse) by Brianne Traylor (offgoing nurse). Report included the following information SBAR, Intake/Output, MAR, Recent Results, Cardiac Rhythm sinus tach and Alarm Parameters .

## 2020-06-23 NOTE — PROGRESS NOTES
I contacted Kole Eubanks @ [phone] but her  picked up the phone.(Ziggy Bansalos) He gave me his wife's work number ([phone] but she did not answer and her voice messaging is not set up. I will try to call her again later. Per her ,his wife gets off work @ 3:30pm. 
I am trying to arrange a meeting with daughter and Dr Michel Glasgow @ his request. 
 
Janna Reese

## 2020-06-23 NOTE — PROGRESS NOTES
I received notification from [facility]  that daughter informed her that daughter works in [place]. She leaves [place] @ 5 am and is not home until 7 pm or after so will not be coming to the hospital today to meet with physicians. Sarah Leon  told me that daughter was trying to work as many hours as possible as she anticipates [employer]. \"I notified David Izquierdo that Rhea Delaney is trying to get pt emergency medicaid but need for daughter to mail back the information sent to her. Ky Danielle is contacting daughter to see if daughter can take tomorrow off to meet with attending and Palliative physician together if possible. Once I know details,I will update attending,Palliative Medicine physician and pt.'s nurse.  
 
Say Fitch

## 2020-06-23 NOTE — PROGRESS NOTES
Suresh Hutchinson [facility] [address] 
[address] [place] [address] 
[phone] Medical Progress Note NAME: Maddie Morales :  1945 MRM:  [id number] Date/Time of service: 2020  8:47 AM 
 
  
Subjective: Chief Complaint:  Patient was personally seen and examined by me during this time period. Chart reviewed. BP low. Still on triple pressors. No escalation of care Objective:  
 
 
Vitals:  
 
 
Last 24hrs VS reviewed since prior progress note. Most recent are: 
 
Visit Vitals BP 95/42 Pulse (!) 111 Temp 98.6 °F (37 °C) Resp (!) 35 Ht 4' 11\" (1.499 m) Wt 78 kg (171 lb 15.3 oz) SpO2 97% BMI 34.73 kg/m² SpO2 Readings from Last 6 Encounters:  
20 97% O2 Flow Rate (L/min): 7 l/min Intake/Output Summary (Last 24 hours) at 2020 9268 Last data filed at 2020 0600 Gross per 24 hour Intake 5459.39 ml Output 292 ml Net 5167.39 ml Exam:  
 
Physical Exam: 
 
Gen:  Disheveled, ill-appearing, intubated HEENT:  Pink conjunctivae, sluggish pupils Neck:  Supple, without masses Resp:  No accessory muscle use, bilateral coarse BS Card:  No murmurs, normal S1, S2 without thrills, + edema Abd:  Soft, non-tender, non-distended Musc:  No cyanosis or clubbing Skin:  No rashes, bruising of L hip Neuro:  sedated Psych:  sedated Medications Reviewed: (see below) Lab Data Reviewed: (see below) 
 
______________________________________________________________________ Medications:  
 
Current Facility-Administered Medications Medication Dose Route Frequency  insulin NPH (NOVOLIN N, HUMULIN N) injection 10 Units  10 Units SubCUTAneous BID  Vancomycin random level  with AM labs    Other ONCE  
 vasopressin (VASOSTRICT) 20 Units in 0.9% sodium chloride 100 mL infusion  0.04 Units/min IntraVENous CONTINUOUS  
 midazolam (VERSED) injection 1 mg  1 mg IntraVENous Q15MIN PRN  
  0.9% sodium chloride infusion 250 mL  250 mL IntraVENous PRN  pantoprazole (PROTONIX) 40 mg in 0.9% sodium chloride 10 mL injection  40 mg IntraVENous Q12H  
 metroNIDAZOLE (FLAGYL) IVPB premix 500 mg  500 mg IntraVENous Q12H  
 insulin lispro (HUMALOG) injection   SubCUTAneous Q6H  
 glucose chewable tablet 16 g  4 Tab Oral PRN  
 dextrose (D50W) injection syrg 12.5-25 g  12.5-25 g IntraVENous PRN  
 glucagon (GLUCAGEN) injection 1 mg  1 mg IntraMUSCular PRN  
 NOREPINephrine (LEVOPHED) 32,000 mcg in dextrose 5% 250 mL (128 mcg/mL) infusion  0.5-200 mcg/min IntraVENous TITRATE  oxyCODONE-acetaminophen (PERCOCET) 5-325 mg per tablet 1 Tab  1 Tab Oral Q6H PRN  propofol (DIPRIVAN) 10 mg/mL infusion  0-50 mcg/kg/min IntraVENous TITRATE  fentaNYL (PF) 1,500 mcg/30 mL (50 mcg/mL) infusion  0-200 mcg/hr IntraVENous TITRATE  sodium bicarbonate (8.4%) 150 mEq in sterile water 1,000 mL infusion   IntraVENous CONTINUOUS  
 PHENYLephrine (NATE-SYNEPHRINE) 100 mg in 0.9% sodium chloride 250 mL infusion   mcg/min IntraVENous TITRATE  cefepime (MAXIPIME) 1 g in 0.9% sodium chloride (MBP/ADV) 50 mL  1 g IntraVENous Q24H  
 vancomycin: Pharmacy dosing by random level   Other Rx Dosing/Monitoring  0.9% sodium chloride infusion 250 mL  250 mL IntraVENous PRN  polyethylene glycol (MIRALAX) packet 17 g  17 g Oral BID  dexAMETHasone (DECADRON) tablet 10 mg  10 mg Oral DAILY  phenyleph-min oil-petrolatum (PREPARATION H) 0.25-14-74.9 % ointment   PeriANAL QID PRN  
 sodium chloride (NS) flush 5-40 mL  5-40 mL IntraVENous Q8H  
 sodium chloride (NS) flush 5-40 mL  5-40 mL IntraVENous PRN  
 acetaminophen (TYLENOL) tablet 650 mg  650 mg Oral Q4H PRN  
 naloxone (NARCAN) injection 0.4 mg  0.4 mg IntraVENous PRN  
 ondansetron (ZOFRAN) injection 4 mg  4 mg IntraVENous Q4H PRN Lab Review:  
 
Recent Labs  
  06/22/20 
0321 06/21/20 
2157 06/21/20 
1613 WBC 32.2* 39.3* 43.1*  
 HGB 8.7* 8.9* 8.7* HCT 25.0* 25.9* 26.6*  
PLT 43* 44* 46* Recent Labs  
  06/22/20 
0321 06/21/20 
0345 06/20/20 
1303 06/20/20 
4318 * 138 144 144  
K 4.4 5.1 4.7 4.0  
CL 96* 105 112* 113* CO2 21 10* 22 20* * 232* 96 130* BUN 80* 71* 74* 73* CREA 5.39* 5.10* 4.17* 4.15* CA <5.0* 6.0* 6.7* 6.7* MG  --   --  2.0 2.1 PHOS 7.1* 8.4*  --   --   
ALB 1.4* 1.5*  --   --   
INR 2.3* 1.8*  --   --   
 
Lab Results Component Value Date/Time Glucose (POC) 351 (H) 06/23/2020 05:45 AM  
 Glucose (POC) 335 (H) 06/23/2020 12:14 AM  
 Glucose (POC) 221 (H) 06/22/2020 05:03 PM  
 Glucose (POC) 279 (H) 06/22/2020 11:38 AM  
 Glucose (POC) 338 (H) 06/22/2020 06:44 AM  
 
 
  
Assessment / Plan:  
 
68 yo hx of HTN, DM, presented w/ hypoxia, COVID PNA, s/p course of remdesivir. Hospital course complicated by fall, Code Blue on 06/19, intubated. Also complicated by new L femoral neck fx, renal failure. Poor prognosis 1) Acute resp failure/hypoxia: no improvement. Poor prognosis. Due to COVID-19. Intubated on 06/19 after a fall, respiratory arrest.  Cont Vent support. Pulm following 2) Septic shock/PNA from COVID-19: worsening. Due to COVID-19 and blood loss anemia. Poor prognosis. High chance of death. Cont triple pressors, IV Vanc, cefepime, flagyl, dexamethasone, Vit C, Zinc.  Palliative care following. Family has decided on DNR, no escalation in care, but not ready for withdrawal of care 3) LESLEY: worsening. Due to COVID, septic shock. Family declined dialysis. Renal following 4) Acute met acidosis/lactic acidosis: due to septic shock. Cont bicarb gtt 5) Acute blood loss anemia: due to L hip fracture from all. S/p 4u RBCs. Cont to monitor Hgb, transfuse prn 
 
6) L hip fracture: due to fall. Too unstable for surgery. Ortho following 7) Elevated liver enzymes: due to COVID, septic shock. Will monitor 8) New type 2 DM: A1C 7.9%. Add NPH bid. Cont SSI prn Code: DNR Total time spent with patient: 28 Minutes **I personally saw and examined the patient during this time period** Care Plan discussed with: nursing, intensivist, CM Discussed:  Care Plan Prophylaxis:  SCD's (due to acute bleeding from hip fx) Disposition:  hospice, comfort care 
        
___________________________________________________ Attending Physician: Laurie Grijalva MD

## 2020-06-23 NOTE — PROGRESS NOTES
Pt. Unstable for surgical repair of Hip fracture. Please reconsult when patient stable for surgery. Dr. Shanell Jason agrees with plan. Thanks, 
 
Armani Lan, PA-C Orthopaedic Surgery PA

## 2020-06-23 NOTE — PROGRESS NOTES
1900: Bedside and Verbal shift change report given to Mery ALVES RN (oncoming nurse) by Anitha Fofana RN (offgoing nurse). Report included the following information SBAR, Kardex, Intake/Output, Recent Results, Cardiac Rhythm ST and Alarm Parameters . Primary Nurse [id number] [place] [address], RN and Anitha Fofana RN performed a dual skin assessment on this patient Impairment noted- see wound doc flow sheet. Sixto score is 8.  
2000: Shift  Assessment completed. Pt repositioned, suctioned, and mouth care completed. Mental Status: Pt opens eyes spontaneously and to voice. Unable to follow commands. Respiratory: Lungs diminished. Vent settings: , RR 35, FiO2 100%, PEEP 10. Cardiac: ST on the monitor. GI/: Killian cath in place. Hypoactive bowel sounds. ABD distended and rigid. OGT to LIWS. IV Drips: RIJ TLC, JAROD 20G. Clinton, Levo, Vasopressin, Bicarb, Fentanyl. 2026: Levophed decreased to 160mcg/min. 2046: PRN versed given. 2200: Pt repositioned, suctioned, and mouth care completed. 2241: PRN versed given. 2300: Pt resting comfortably. 0000: Reassessment completed. No changes from previous assessment. Pt repositioned, suctioned, and mouth care completed. 3542: Levophed increased to 165mcg/min 
0120: Clinton increased to 270mcg/min. 
0200: Pt repositioned, suctioned, and mouth care completed. Levophed increased to 170mcg/min. 0245: PRN versed given. 0598: Clinton increased to 275mcg/min 
0355: Clinton increased to 280mcg/min 
0400: Reassessment completed. No changes from previous assessment. Pt repositioned, suctioned, and mouth care completed. 0530: Full CHG bath with killian cath care completed. 0600: Pt repositioned, suctioned, and mouth care completed. Restraints no longer needed, pt not making any attempts to reach for lines/tubes.   
0620: Levo increased to 175mcg/min 
0649: Clinton increased to 285mcg/min 
0700: Bedside and Verbal shift change report given to Belinda Romero (oncoming nurse) by Sheyla Carballo RN (offgoing nurse). Report included the following information SBAR, Kardex, Intake/Output, Recent Results, Cardiac Rhythm ST and Alarm Parameters .

## 2020-06-23 NOTE — PROGRESS NOTES
[facility] Terrence Gamez YOB: 1945 Assessment & Plan:  
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 
HL 
Lactic acidosis Shock, on pressors Resp failure on vent Hypocalcemia Rec: 
DNR/no dialysis per family Remains oliguric Continuing supportive care. No plans to escalate per family/PC Subjective:  
CC: F/u LESLEY 
HPI: Remains oliguric. On multiple pressors and vent. Family not comfortable with withdrawal but reiterated no escalation including dialysis yesterday with palliative care. ROS unobtainable due to pt condition Current Facility-Administered Medications Medication Dose Route Frequency  insulin NPH (NOVOLIN N, HUMULIN N) injection 10 Units  10 Units SubCUTAneous BID  Vancomycin random level 6/23 with AM labs    Other ONCE  
 vasopressin (VASOSTRICT) 20 Units in 0.9% sodium chloride 100 mL infusion  0.04 Units/min IntraVENous CONTINUOUS  
 midazolam (VERSED) injection 1 mg  1 mg IntraVENous Q15MIN PRN  
 0.9% sodium chloride infusion 250 mL  250 mL IntraVENous PRN  pantoprazole (PROTONIX) 40 mg in 0.9% sodium chloride 10 mL injection  40 mg IntraVENous Q12H  
 metroNIDAZOLE (FLAGYL) IVPB premix 500 mg  500 mg IntraVENous Q12H  
 insulin lispro (HUMALOG) injection   SubCUTAneous Q6H  
 glucose chewable tablet 16 g  4 Tab Oral PRN  
 dextrose (D50W) injection syrg 12.5-25 g  12.5-25 g IntraVENous PRN  
 glucagon (GLUCAGEN) injection 1 mg  1 mg IntraMUSCular PRN  
 NOREPINephrine (LEVOPHED) 32,000 mcg in dextrose 5% 250 mL (128 mcg/mL) infusion  0.5-200 mcg/min IntraVENous TITRATE  oxyCODONE-acetaminophen (PERCOCET) 5-325 mg per tablet 1 Tab  1 Tab Oral Q6H PRN  propofol (DIPRIVAN) 10 mg/mL infusion  0-50 mcg/kg/min IntraVENous TITRATE  fentaNYL (PF) 1,500 mcg/30 mL (50 mcg/mL) infusion  0-200 mcg/hr IntraVENous TITRATE  sodium bicarbonate (8.4%) 150 mEq in sterile water 1,000 mL infusion   IntraVENous CONTINUOUS  
 PHENYLephrine (NATE-SYNEPHRINE) 100 mg in 0.9% sodium chloride 250 mL infusion   mcg/min IntraVENous TITRATE  cefepime (MAXIPIME) 1 g in 0.9% sodium chloride (MBP/ADV) 50 mL  1 g IntraVENous Q24H  
 vancomycin: Pharmacy dosing by random level   Other Rx Dosing/Monitoring  0.9% sodium chloride infusion 250 mL  250 mL IntraVENous PRN  polyethylene glycol (MIRALAX) packet 17 g  17 g Oral BID  dexAMETHasone (DECADRON) tablet 10 mg  10 mg Oral DAILY  phenyleph-min oil-petrolatum (PREPARATION H) 0.25-14-74.9 % ointment   PeriANAL QID PRN  
 sodium chloride (NS) flush 5-40 mL  5-40 mL IntraVENous Q8H  
 sodium chloride (NS) flush 5-40 mL  5-40 mL IntraVENous PRN  
 acetaminophen (TYLENOL) tablet 650 mg  650 mg Oral Q4H PRN  
 naloxone (NARCAN) injection 0.4 mg  0.4 mg IntraVENous PRN  
 ondansetron (ZOFRAN) injection 4 mg  4 mg IntraVENous Q4H PRN Objective:  
 
Vitals: 
Blood pressure (!) 107/13, pulse (!) 116, temperature 98.1 °F (36.7 °C), resp. rate (!) 31, height 4' 11\" (1.499 m), weight 78 kg (171 lb 15.3 oz), SpO2 97 %. Temp (24hrs), Av.4 °F (37.4 °C), Min:98.1 °F (36.7 °C), Max:99.9 °F (37.7 °C) Intake and Output: 
 0701 -  1900 In: 36 Out: 100  
 190 -  0700 In: 7810 [I.V.:9135] Out: 299 [JECDS:894] Physical Exam:              
IN person exam deferred due to COVID isolation status GEN sedated on vent CV tachy ECG/rhythm: 
 
Data Review No results for input(s): TNIPOC in the last 72 hours. No lab exists for component: ITNL No results for input(s): CPK, CKMB, TROIQ in the last 72 hours. Recent Labs  
  20 
0321 20 
2157 20 
1613  20 
0345  20 
1303 *  --   --   --  138  --  144  
K 4.4  --   --   --  5.1  --  4.7 CL 96*  --   --   --  105  --  112* CO2 21  --   --   --  10*  --  22 BUN 80*  --   --   --  71*  --  74* CREA 5.39*  --   --   --  5.10*  --  4.17* *  --   --   --  232*  --  96 PHOS 7.1*  --   --   --  8.4*  --   --   
MG  --   --   --   --   --   --  2.0  
CA <5.0*  --   --   --  6.0*  --  6.7* ALB 1.4*  --   --   --  1.5*  --   --   
WBC 32.2* 39.3* 43.1*  --  40.5*   < >  --   
HGB 8.7* 8.9* 8.7*   < > 8.0*   < >  --   
HCT 25.0* 25.9* 26.6*   < > 24.8*  --   --   
PLT 43* 44* 46*  --  58*   < >  --   
 < > = values in this interval not displayed. Recent Labs  
  06/22/20 
0321 06/21/20 
0345 INR 2.3* 1.8* PTP 22.4* 18.2* Needs: urine analysis, urine sodium, protein and creatinine Lab Results Component Value Date/Time Sodium,urine random 80 06/07/2020 09:03 PM  
 Creatinine, urine 59.00 06/07/2020 09:03 PM  
 Creatinine, urine 62.10 06/07/2020 09:03 PM  
 
 
 
 
: Lizandro Guzman MD 
6/23/2020 [facility]: 
[email or web address] Lori [place] office: 
[address] [place] [address], [address] [place], [address] [facility] Phone: [phone] Fax :     [phone] [place] office: 
[address] [facility], [address] Phone - [phone] Fax - [phone]

## 2020-06-23 NOTE — PROGRESS NOTES
Anderson Kumar contacted me that pt.'s daughter will be here tomorrow @ 1400 pm.Lolis, will be available by phone to translate for daughter. Lolis's number is [phone]. I will notify Palliative physician as she would like to be in the meeting also Mellissa Jean Baptiste

## 2020-06-24 NOTE — PROGRESS NOTES
Family meeting held with Dr Aliya Schwarz ,pt.'s daughter(hCelsie) and myself with (Lolis) to discuss goals of care. Daughter reminisced about her mother and gave us details about her mother's compassionate and strong nature and recounted many life lessons and values her mother instilled in her family . Daughter is appropriately grieving and is in agreement with compassionately stopping the pressors and transitioning her mother into comfort care with compassion and dignity. Daughter did see her mother through the window and has chosen not to see her again. Daughter states her brothers have said their goodbyes. She also said her mother sensed that she would die in the hospital . 
Daughter expressed being thankful for all the treatment team has done for pt.  
 
Lacey Rodriguez

## 2020-06-24 NOTE — PROGRESS NOTES
Courtesy: Not much to add. Lala. No evidence of recovery. No labs and no escalation of care per family. Following loosely.

## 2020-06-24 NOTE — PROGRESS NOTES
Death note:  
 
Notified by bedside RN of patient's passing at 17:25 today. Called our language services Croatian interpretor Monster Kellogg who assisted in calling daughter Adrian Herrera to inform her of her mothers' passing. She will choose a  home and follow up with nursing supervisor tomorrow via Monster Kellogg tomorrow.

## 2020-06-24 NOTE — PROGRESS NOTES
Patient not doing well. 1400- Family meeting. 1600- Withdrawal from care. Compassionate discharge. 1725- Patient does not have a pulse, does not have any breath sounds, pupils dilated.

## 2020-06-24 NOTE — PROGRESS NOTES
Provided  services remotely to Dr. Devin Alexander and patient's daughter Adrianne Quinones to inform her of her mother's passing. Will continue to f/u with daughter and nursing supervisor. [facility] 
[phone]

## 2020-06-24 NOTE — ACP (ADVANCE CARE PLANNING)
Palliative care advance care planning note: 
 
GOALS OF CARE: 
Patient/Health Care Proxy Stated Goals: Comfort 
  
TREATMENT PREFERENCES:  
Code Status: DNR 
  
Advance Care Planning: 
[x]? [employer] Interdisciplinary Team has updated the ACP Navigator with Health Care Decision Maker and Patient Capacity 
  
  Primary Decision Maker: Sola Rivera - Megan - [phone]  
  
Advance Care Planning 6/22/2020 Patient's Healthcare Decision Maker is: Legal Next of Kin Confirm Advance Directive None Patient Would Like to Complete Advance Directive Unable  
  
  
Medical Interventions: Comfort measures ACP conversation:   
Patient's daughter Mona Mack came to ICU to see her mother. I then conducted a meeting outside the ICU with Kaity accompanied by Prashanth Sullivan and Dr. Facundo Singer.  We utilized our  Byron George via speaker phone. Allowed kaity to share about her mother. She told us how resilient she has been over her lifetime, surviving a difficult marriage, raising 6 children on her own since she was 44, moving to the [address] [place] [address],[address], back to [facility] and then back here again. She lived and loved fiercely, was a positive person and hard worker. Mona Mack states her mother had a strong sense that she would not survive this hospitalization and shared this with her children throughout her time here, while she was still awake. This brings some peace to Mona Mack as she knows her mother was at peace if she were to die. The decision was already clear not to pursue dialysis. I further explained medical recommendation to make her comfortable by stopping all intravenous medications today. Kaity expressed good understanding and agrees that allowing her mother to pass comfortably at this juncture is most desired. She wants to remember her mother as she was, and doesn't plan on visiting her again in the ICU.   She shared that all of her brothers have had the opportunity to talk with her earlier in hospital stay as well and do not desire a phone call. See daily note for plan. Time spent:  80 min.   14:00-15:20

## 2020-06-24 NOTE — PROGRESS NOTES
Suresh Hutchinson [facility] 
[address] [facility], [address] [place] [address] 
[phone] Medical Progress Note NAME: Chiquita Mccoy :  1945 MRM:  [id number] Date/Time of service: 2020  10:43 AM 
 
  
Subjective: Chief Complaint:  Patient was personally seen and examined by me during this time period. Chart reviewed. No decompensation overnight. Still on pressors Objective:  
 
 
Vitals:  
 
 
Last 24hrs VS reviewed since prior progress note. Most recent are: 
 
Visit Vitals /76 (BP 1 Location: Left arm, BP Patient Position: At rest) Pulse (!) 103 Temp 99.5 °F (37.5 °C) Resp (!) 35 Ht 4' 11\" (1.499 m) Wt 78 kg (171 lb 15.3 oz) SpO2 91% BMI 34.73 kg/m² SpO2 Readings from Last 6 Encounters:  
20 91% O2 Flow Rate (L/min): 7 l/min Intake/Output Summary (Last 24 hours) at 2020 1043 Last data filed at 2020 0700 Gross per 24 hour Intake 4447 ml Output 105 ml Net 4342 ml Exam:  
 
Physical Exam: 
 
Gen:  Disheveled, ill-appearing, intubated HEENT:  Pink conjunctivae, sluggish pupils Neck:  Supple, without masses Resp:  No accessory muscle use, bilateral coarse BS Card:  No murmurs, normal S1, S2 without thrills, + edema Abd:  Soft, non-tender, non-distended Musc:  No cyanosis or clubbing Skin:  No rashes, bruising of L hip Neuro:  sedated Psych:  sedated Medications Reviewed: (see below) Lab Data Reviewed: (see below) 
 
______________________________________________________________________ Medications:  
 
Current Facility-Administered Medications Medication Dose Route Frequency  insulin NPH (NOVOLIN N, HUMULIN N) injection 10 Units  10 Units SubCUTAneous BID  vasopressin (VASOSTRICT) 20 Units in 0.9% sodium chloride 100 mL infusion  0.04 Units/min IntraVENous CONTINUOUS  
 midazolam (VERSED) injection 1 mg  1 mg IntraVENous Q15MIN PRN  
  0.9% sodium chloride infusion 250 mL  250 mL IntraVENous PRN  pantoprazole (PROTONIX) 40 mg in 0.9% sodium chloride 10 mL injection  40 mg IntraVENous Q12H  
 metroNIDAZOLE (FLAGYL) IVPB premix 500 mg  500 mg IntraVENous Q12H  
 insulin lispro (HUMALOG) injection   SubCUTAneous Q6H  
 glucose chewable tablet 16 g  4 Tab Oral PRN  
 dextrose (D50W) injection syrg 12.5-25 g  12.5-25 g IntraVENous PRN  
 glucagon (GLUCAGEN) injection 1 mg  1 mg IntraMUSCular PRN  
 NOREPINephrine (LEVOPHED) 32,000 mcg in dextrose 5% 250 mL (128 mcg/mL) infusion  0.5-200 mcg/min IntraVENous TITRATE  oxyCODONE-acetaminophen (PERCOCET) 5-325 mg per tablet 1 Tab  1 Tab Oral Q6H PRN  propofol (DIPRIVAN) 10 mg/mL infusion  0-50 mcg/kg/min IntraVENous TITRATE  fentaNYL (PF) 1,500 mcg/30 mL (50 mcg/mL) infusion  0-200 mcg/hr IntraVENous TITRATE  sodium bicarbonate (8.4%) 150 mEq in sterile water 1,000 mL infusion   IntraVENous CONTINUOUS  
 PHENYLephrine (NATE-SYNEPHRINE) 100 mg in 0.9% sodium chloride 250 mL infusion   mcg/min IntraVENous TITRATE  cefepime (MAXIPIME) 1 g in 0.9% sodium chloride (MBP/ADV) 50 mL  1 g IntraVENous Q24H  
 0.9% sodium chloride infusion 250 mL  250 mL IntraVENous PRN  
 dexAMETHasone (DECADRON) tablet 10 mg  10 mg Oral DAILY  phenyleph-min oil-petrolatum (PREPARATION H) 0.25-14-74.9 % ointment   PeriANAL QID PRN  
 sodium chloride (NS) flush 5-40 mL  5-40 mL IntraVENous Q8H  
 sodium chloride (NS) flush 5-40 mL  5-40 mL IntraVENous PRN  
 acetaminophen (TYLENOL) tablet 650 mg  650 mg Oral Q4H PRN  
 naloxone (NARCAN) injection 0.4 mg  0.4 mg IntraVENous PRN  
 ondansetron (ZOFRAN) injection 4 mg  4 mg IntraVENous Q4H PRN Lab Review:  
 
Recent Labs  
  06/22/20 
0321 06/21/20 
2157 06/21/20 
1613 WBC 32.2* 39.3* 43.1* HGB 8.7* 8.9* 8.7* HCT 25.0* 25.9* 26.6*  
PLT 43* 44* 46* Recent Labs  
  06/22/20 
0321 * K 4.4 CL 96* CO2 21 * BUN 80* CREA 5.39* CA <5.0*  
PHOS 7.1* ALB 1.4* INR 2.3* Lab Results Component Value Date/Time Glucose (POC) 215 (H) 06/24/2020 06:30 AM  
 Glucose (POC) 210 (H) 06/24/2020 02:14 AM  
 Glucose (POC) 185 (H) 06/23/2020 06:36 PM  
 Glucose (POC) 140 (H) 06/23/2020 12:38 PM  
 Glucose (POC) 351 (H) 06/23/2020 05:45 AM  
 
 
  
Assessment / Plan:  
 
68 yo hx of HTN, DM, presented w/ hypoxia, COVID PNA, s/p course of remdesivir. Hospital course complicated by fall, Code Blue on 06/19, intubated. Also complicated by new L femoral neck fx, renal failure. Poor prognosis 1) Acute resp failure/hypoxia: no improvement. Poor prognosis. Due to COVID-19. Intubated on 06/19 after a fall, respiratory arrest.  Cont Vent support. Pulm following 2) Septic shock/PNA from COVID-19: worsening. Due to COVID-19 and blood loss anemia. Poor prognosis. High chance of death. Cont triple pressors, IV Vanc, cefepime, flagyl, dexamethasone, Vit C, Zinc.  Palliative care following. Family has decided on DNR, no escalation in care, but not ready for withdrawal of care. Will have a meeting with daughter this afternoon about withdrawal of care 3) LESLEY: worsening. Due to COVID, septic shock. Family declined dialysis. Renal following 4) Acute met acidosis/lactic acidosis: due to septic shock. Cont bicarb gtt 5) Acute blood loss anemia: due to L hip fracture from all. S/p 4u RBCs. No more labs 6) L hip fracture: due to fall. Too unstable for surgery. Ortho following 7) Elevated liver enzymes: due to COVID, septic shock. No more labs 8) New type 2 DM: A1C 7.9%. Cont NPH bid. Cont SSI prn Code: DNR Total time spent with patient: 20 Minutes **I personally saw and examined the patient during this time period** Care Plan discussed with: nursing, intensivist, CM Discussed:  Care Plan Prophylaxis:  SCD's (due to acute bleeding from hip fx) Disposition:  hospice, comfort care 
        
___________________________________________________ Attending Physician: Cherelle Cross MD

## 2020-06-25 LAB
BACTERIA SPEC CULT: NORMAL
BACTERIA SPEC CULT: NORMAL
SERVICE CMNT-IMP: NORMAL
SERVICE CMNT-IMP: NORMAL

## 2020-06-25 NOTE — DISCHARGE SUMMARY
Death note Admission date: 20 Discharge date (date of death): 20 at 5:25pm 
 
Admission diagnosis: Pneumonia due to COVID-19, acute resp failure Discharge diagnosis: same Consults: Pulm, Renal, Ortho, Palliative care 68 yo hx of HTN, DM, presented w/ hypoxia, COVID-19 PNA. Despite receiving a course of Remdesivir, patient was marginally better. Hospital course also complicated by a fall with subsequent Code Blue on  and was intubated. She was also found to have a new L femoral neck fx, renal failure, and severe blood loss anemia from fracture. Given poor prognosis, patient's family decided against dialysis and finally agreed to comfort care only on . Her triple pressors support were discontinued and patient  on  at 5:25pm.  Cause of death: Pneumonia from COVID-23

## 2020-06-25 NOTE — PROGRESS NOTES
responded to death of pt at the ICU. The was no family present. Nurse indicated that family had been inform and they were not coming to the hospital. Nurse was advised of spiritual care availability as needed or as able. Visited by: David Serrato. Sean mayersin: [phone])
